# Patient Record
Sex: MALE | Race: WHITE | Employment: OTHER | URBAN - NONMETROPOLITAN AREA
[De-identification: names, ages, dates, MRNs, and addresses within clinical notes are randomized per-mention and may not be internally consistent; named-entity substitution may affect disease eponyms.]

---

## 2017-08-13 ENCOUNTER — HOSPITAL ENCOUNTER (EMERGENCY)
Age: 62
Discharge: HOME | End: 2017-08-13
Attending: EMERGENCY MEDICINE
Payer: MEDICARE

## 2017-08-13 ENCOUNTER — APPOINTMENT (OUTPATIENT)
Dept: GENERAL RADIOLOGY | Age: 62
End: 2017-08-13
Payer: MEDICARE

## 2017-08-13 VITALS
TEMPERATURE: 98.2 F | DIASTOLIC BLOOD PRESSURE: 86 MMHG | HEART RATE: 76 BPM | SYSTOLIC BLOOD PRESSURE: 161 MMHG | RESPIRATION RATE: 18 BRPM | OXYGEN SATURATION: 96 %

## 2017-08-13 DIAGNOSIS — S16.1XXA CERVICAL STRAIN, ACUTE, INITIAL ENCOUNTER: ICD-10-CM

## 2017-08-13 DIAGNOSIS — R52 PAIN: ICD-10-CM

## 2017-08-13 DIAGNOSIS — M47.26 OSTEOARTHRITIS OF SPINE WITH RADICULOPATHY, LUMBAR REGION: Primary | ICD-10-CM

## 2017-08-13 PROCEDURE — 72040 X-RAY EXAM NECK SPINE 2-3 VW: CPT

## 2017-08-13 PROCEDURE — 99283 EMERGENCY DEPT VISIT LOW MDM: CPT

## 2017-08-13 PROCEDURE — 72170 X-RAY EXAM OF PELVIS: CPT

## 2017-08-13 PROCEDURE — 72050 X-RAY EXAM NECK SPINE 4/5VWS: CPT

## 2017-08-13 PROCEDURE — 72110 X-RAY EXAM L-2 SPINE 4/>VWS: CPT

## 2017-08-13 PROCEDURE — 6360000002 HC RX W HCPCS: Performed by: EMERGENCY MEDICINE

## 2017-08-13 PROCEDURE — 96372 THER/PROPH/DIAG INJ SC/IM: CPT

## 2017-08-13 RX ORDER — KETOROLAC TROMETHAMINE 30 MG/ML
30 INJECTION, SOLUTION INTRAMUSCULAR; INTRAVENOUS ONCE
Status: COMPLETED | OUTPATIENT
Start: 2017-08-13 | End: 2017-08-13

## 2017-08-13 RX ORDER — NAPROXEN 375 MG/1
375 TABLET ORAL 2 TIMES DAILY
Qty: 15 TABLET | Refills: 0 | Status: ON HOLD | OUTPATIENT
Start: 2017-08-13 | End: 2022-01-18 | Stop reason: HOSPADM

## 2017-08-13 RX ORDER — CYCLOBENZAPRINE HCL 10 MG
10 TABLET ORAL 3 TIMES DAILY PRN
Qty: 15 TABLET | Refills: 0 | Status: SHIPPED | OUTPATIENT
Start: 2017-08-13 | End: 2017-08-18

## 2017-08-13 RX ADMIN — KETOROLAC TROMETHAMINE 30 MG: 30 INJECTION, SOLUTION INTRAMUSCULAR at 10:49

## 2017-08-13 ASSESSMENT — ENCOUNTER SYMPTOMS
SHORTNESS OF BREATH: 0
EYE REDNESS: 0
NAUSEA: 0
ABDOMINAL PAIN: 0
RHINORRHEA: 0
BACK PAIN: 1
WHEEZING: 0
EYE DISCHARGE: 0
VOMITING: 0
COUGH: 0
DIARRHEA: 0
SORE THROAT: 0

## 2017-08-13 ASSESSMENT — PAIN SCALES - GENERAL
PAINLEVEL_OUTOF10: 6
PAINLEVEL_OUTOF10: 6

## 2017-08-13 ASSESSMENT — PAIN DESCRIPTION - PAIN TYPE: TYPE: ACUTE PAIN

## 2017-08-13 ASSESSMENT — PAIN DESCRIPTION - FREQUENCY: FREQUENCY: CONTINUOUS

## 2018-01-10 ENCOUNTER — HOSPITAL ENCOUNTER (EMERGENCY)
Age: 63
Discharge: HOME | End: 2018-01-10
Payer: MEDICARE

## 2018-01-10 VITALS
DIASTOLIC BLOOD PRESSURE: 102 MMHG | TEMPERATURE: 98.3 F | HEART RATE: 96 BPM | OXYGEN SATURATION: 95 % | HEIGHT: 71 IN | WEIGHT: 190 LBS | RESPIRATION RATE: 18 BRPM | SYSTOLIC BLOOD PRESSURE: 174 MMHG | BODY MASS INDEX: 26.6 KG/M2

## 2018-01-10 DIAGNOSIS — M54.31 SCIATICA OF RIGHT SIDE: Primary | ICD-10-CM

## 2018-01-10 PROCEDURE — 6360000002 HC RX W HCPCS: Performed by: NURSE PRACTITIONER

## 2018-01-10 PROCEDURE — 99283 EMERGENCY DEPT VISIT LOW MDM: CPT

## 2018-01-10 PROCEDURE — 96372 THER/PROPH/DIAG INJ SC/IM: CPT

## 2018-01-10 RX ORDER — KETOROLAC TROMETHAMINE 30 MG/ML
60 INJECTION, SOLUTION INTRAMUSCULAR; INTRAVENOUS ONCE
Status: COMPLETED | OUTPATIENT
Start: 2018-01-10 | End: 2018-01-10

## 2018-01-10 RX ORDER — METHYLPREDNISOLONE 4 MG/1
TABLET ORAL
Qty: 1 KIT | Refills: 0 | Status: SHIPPED | OUTPATIENT
Start: 2018-01-10 | End: 2018-01-16

## 2018-01-10 RX ORDER — ORPHENADRINE CITRATE 30 MG/ML
60 INJECTION INTRAMUSCULAR; INTRAVENOUS ONCE
Status: COMPLETED | OUTPATIENT
Start: 2018-01-10 | End: 2018-01-10

## 2018-01-10 RX ORDER — CYCLOBENZAPRINE HCL 10 MG
10 TABLET ORAL 3 TIMES DAILY PRN
Qty: 15 TABLET | Refills: 0 | Status: ON HOLD | OUTPATIENT
Start: 2018-01-10 | End: 2022-01-18 | Stop reason: HOSPADM

## 2018-01-10 RX ADMIN — ORPHENADRINE CITRATE 60 MG: 30 INJECTION INTRAMUSCULAR; INTRAVENOUS at 14:01

## 2018-01-10 RX ADMIN — KETOROLAC TROMETHAMINE 60 MG: 30 INJECTION, SOLUTION INTRAMUSCULAR at 14:01

## 2018-01-10 ASSESSMENT — PAIN DESCRIPTION - DESCRIPTORS: DESCRIPTORS: SHOOTING

## 2018-01-10 ASSESSMENT — ENCOUNTER SYMPTOMS
COUGH: 0
PHOTOPHOBIA: 0
RHINORRHEA: 0
BACK PAIN: 1
DIARRHEA: 0
ABDOMINAL PAIN: 0
SHORTNESS OF BREATH: 0
VOMITING: 0
NAUSEA: 0

## 2018-01-10 ASSESSMENT — PAIN DESCRIPTION - FREQUENCY: FREQUENCY: CONTINUOUS

## 2018-01-10 ASSESSMENT — PAIN DESCRIPTION - LOCATION: LOCATION: LEG

## 2018-01-10 ASSESSMENT — PAIN SCALES - GENERAL: PAINLEVEL_OUTOF10: 6

## 2018-01-10 ASSESSMENT — PAIN DESCRIPTION - PAIN TYPE: TYPE: ACUTE PAIN

## 2018-01-10 ASSESSMENT — PAIN DESCRIPTION - ORIENTATION: ORIENTATION: RIGHT

## 2018-01-10 NOTE — ED PROVIDER NOTES
paternal uncles is alive. family history includes Cancer (age of onset: 61) in his paternal uncle; Coronary Art Dis in his mother; Diabetes in his paternal grandmother and sister; Heart Attack (age of onset: 68) in his mother; Heart Disease in his mother; Heart Failure in his mother; High Blood Pressure in his mother and sister; Hypertension in his mother; Other in his sister. SOCIAL HISTORY      reports that he has been smoking Cigarettes. He has a 5.00 pack-year smoking history. He quit smokeless tobacco use about 45 years ago. He reports that he drinks about 3.6 oz of alcohol per week . He reports that he uses drugs, including Cocaine. PHYSICAL EXAM     INITIAL VITALS:  height is 5' 11\" (1.803 m) and weight is 190 lb (86.2 kg). His oral temperature is 98.3 °F (36.8 °C). His blood pressure is 174/102 (abnormal) and his pulse is 96. His respiration is 18 and oxygen saturation is 95%. Physical Exam   Constitutional: Vital signs are normal. He appears well-developed and well-nourished. He is active and cooperative. Non-toxic appearance. No distress. HENT:   Head: Normocephalic and atraumatic. Right Ear: Hearing normal.   Left Ear: Hearing normal.   Nose: Nose normal. No rhinorrhea. No epistaxis. Mouth/Throat: Uvula is midline, oropharynx is clear and moist and mucous membranes are normal.   Eyes: Conjunctivae and EOM are normal. Pupils are equal, round, and reactive to light. Neck: No JVD present. No neck rigidity. No tracheal deviation present. Cardiovascular: Normal rate, regular rhythm, normal heart sounds and intact distal pulses. Pulses:       Dorsalis pedis pulses are 2+ on the right side, and 2+ on the left side. Posterior tibial pulses are 2+ on the right side, and 2+ on the left side. Pulmonary/Chest: Effort normal and breath sounds normal. No respiratory distress. He has no decreased breath sounds. He has no wheezes. Abdominal: Normal appearance.  He exhibits no 95%   Weight: 190 lb (86.2 kg)   Height: 5' 11\" (1.803 m)       MDM    Medications   orphenadrine (NORFLEX) injection 60 mg (60 mg Intramuscular Given 1/10/18 1401)   ketorolac (TORADOL) injection 60 mg (60 mg Intramuscular Given 1/10/18 1401)       The patient was seen and evaluated within the ED today for back pain with associated radicular right leg pain. Within the department, I observed the patient's vital signs to show an elevated blood pressure. The patient states he has had high blood pressure for awhile, but does not have a PCP for management of the blood pressure. The patient has been informed that they may have pre-hypertension or hypertension based on a blood pressure reading in the emergency department. I recommend that the patient call the primary care provider listed on their discharge instructions or a physician of their choice this week to arrange follow up for further evaluation of possible pre-hypertension or hypertension. On exam, I appreciated right lumbar paraspinal tenderness, no midline lumbar spine tenderenss. Imaging was not felt necessary at this time as the patient's pain is chronic and he did not have any injury prior to the pain increasing. This was discussed with the patient, who was amenable with this decision. Within the department, the patient was treated with Norflex and Toradol. I observed the patient's condition to improve during the duration of their stay. I explained my proposed course of treatment to the patient, and they were amenable to my decision. They were discharged home, and they will return to the ED if their symptoms become more severe in nature, or otherwise change. CRITICAL CARE:   None     CONSULTS:  None     PROCEDURES:  None     FINAL IMPRESSION      1. Sciatica of right side          DISPOSITION/PLAN   The patient was discharged.     PATIENT REFERRED TO:  [facility] [place] [address] [address] [phone]  Call   As needed    Find a PCP from the list you were provided. DISCHARGE MEDICATIONS:  New Prescriptions    CYCLOBENZAPRINE (FLEXERIL) 10 MG TABLET    Take 1 tablet by mouth 3 times daily as needed for Muscle spasms . Do not drive or operate heavy machinery while taking this medication. METHYLPREDNISOLONE (MEDROL, STONEY,) 4 MG TABLET    Take by mouth. (Please note that portions of this note were completed with a voice recognition program.  Efforts were made to edit the dictations but occasionally words are mis-transcribed.)    Scribe:  Sun Diaz 1/10/18 1:21 PM Scribing for and in the presence of En Mtz CNP. Signed by: Anthony Young, 1/10/18 2:46 PM    Provider:  I personally performed the services described in the documentation, reviewed and edited the documentation which was dictated to the scribe in my presence, and it accurately records my words and actions.     En Mtz CNP 01/10/18 2:46 PM    Majo Degroot [address], APRFERNANDO  01/10/18 5691

## 2021-09-23 ENCOUNTER — HOSPITAL ENCOUNTER (EMERGENCY)
Age: 66
Discharge: LEFT BEFORE DISCHARGE | End: 2021-09-23
Attending: EMERGENCY MEDICINE
Payer: MEDICARE

## 2021-09-23 VITALS
SYSTOLIC BLOOD PRESSURE: 195 MMHG | HEIGHT: 71 IN | DIASTOLIC BLOOD PRESSURE: 113 MMHG | OXYGEN SATURATION: 97 % | HEART RATE: 94 BPM | RESPIRATION RATE: 20 BRPM | WEIGHT: 187 LBS | BODY MASS INDEX: 26.18 KG/M2 | TEMPERATURE: 98.2 F

## 2021-09-23 ASSESSMENT — PAIN SCALES - GENERAL: PAINLEVEL_OUTOF10: 7

## 2021-09-23 ASSESSMENT — PAIN DESCRIPTION - LOCATION: LOCATION: HEAD

## 2021-09-23 ASSESSMENT — PAIN DESCRIPTION - ORIENTATION: ORIENTATION: RIGHT

## 2021-09-24 NOTE — ED NOTES
Pt reports he used to take Lisinopril for his BP but has not taken med for 3 years.      Tiffanie Thomas RN  09/23/21 4203
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
## 2021-09-24 NOTE — ED PROVIDER NOTES
I went to see the patient and he was not in the room. I was told by nurse that he eloped.       Dori Johnson MD  09/23/21 3882

## 2021-09-24 NOTE — ED NOTES
Pt has put on call light multiple times requesting to be seen by physician. Pt now states he no longer wants to wait and is leaving. Pt A&Ox4. Pt ambulating without assistance in room.      Willard Ceballos RN  09/23/21 4509

## 2021-10-07 ENCOUNTER — HOSPITAL ENCOUNTER (EMERGENCY)
Age: 66
Discharge: HOME | End: 2021-10-07
Payer: MEDICARE

## 2021-10-07 VITALS
RESPIRATION RATE: 18 BRPM | SYSTOLIC BLOOD PRESSURE: 150 MMHG | DIASTOLIC BLOOD PRESSURE: 90 MMHG | HEART RATE: 75 BPM | OXYGEN SATURATION: 97 %
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
## 2022-01-15 ENCOUNTER — HOSPITAL ENCOUNTER (OUTPATIENT)
Age: 67
Setting detail: OBSERVATION
Discharge: HOME | End: 2022-01-18
Attending: EMERGENCY MEDICINE | Admitting: INTERNAL MEDICINE
Payer: MEDICARE

## 2022-01-15 DIAGNOSIS — R42 DISEQUILIBRIUM: ICD-10-CM

## 2022-01-15 DIAGNOSIS — K92.0 HEMATEMESIS, PRESENCE OF NAUSEA NOT SPECIFIED: ICD-10-CM

## 2022-01-15 DIAGNOSIS — R07.9 CHEST PAIN, MODERATE CORONARY ARTERY RISK: Primary | ICD-10-CM

## 2022-01-15 PROCEDURE — 99285 EMERGENCY DEPT VISIT HI MDM: CPT

## 2022-01-16 ENCOUNTER — APPOINTMENT (OUTPATIENT)
Dept: CT IMAGING | Age: 67
End: 2022-01-16
Payer: MEDICARE

## 2022-01-16 PROBLEM — R07.9 CHEST PAIN: Status: ACTIVE | Noted: 2022-01-16

## 2022-01-16 LAB
ALBUMIN SERPL-MCNC: 3.8 G/DL (ref 3.5–5.1)
ALP BLD-CCNC: 117 U/L (ref 38–126)
ALT SERPL-CCNC: 54 U/L (ref 11–66)
AMPHETAMINE+METHAMPHETAMINE URINE SCREEN: NEGATIVE
ANION GAP SERPL CALCULATED.3IONS-SCNC: 13 MEQ/L (ref 8–16)
AST SERPL-CCNC: 99 U/L (ref 5–40)
BARBITURATE QUANTITATIVE URINE: NEGATIVE
BASOPHILS # BLD: 0.3 %
BASOPHILS ABSOLUTE: 0 THOU/MM3 (ref 0–0.1)
BENZODIAZEPINE QUANTITATIVE URINE: NEGATIVE
BILIRUB SERPL-MCNC: 0.4 MG/DL (ref 0.3–1.2)
BUN BLDV-MCNC: 10 MG/DL (ref 7–22)
CALCIUM SERPL-MCNC: 8.9 MG/DL (ref 8.5–10.5)
CANNABINOID QUANTITATIVE URINE: NEGATIVE
CHLORIDE BLD-SCNC: 106 MEQ/L (ref 98–111)
CHOLESTEROL, TOTAL: 171 MG/DL (ref 100–199)
CO2: 21 MEQ/L (ref 23–33)
COCAINE METABOLITE QUANTITATIVE URINE: NEGATIVE
CREAT SERPL-MCNC: 0.9 MG/DL (ref 0.4–1.2)
EKG ATRIAL RATE: 82 BPM
EKG ATRIAL RATE: 92 BPM
EKG P AXIS: 63 DEGREES
EKG P AXIS: 77 DEGREES
EKG P-R INTERVAL: 184 MS
EKG P-R INTERVAL: 184 MS
EKG Q-T INTERVAL: 382 MS
EKG Q-T INTERVAL: 396 MS
EKG QRS DURATION: 104 MS
EKG QRS DURATION: 118 MS
EKG QTC CALCULATION (BAZETT): 462 MS
EKG QTC CALCULATION (BAZETT): 472 MS
EKG R AXIS: 43 DEGREES
EKG R AXIS: 70 DEGREES
EKG T AXIS: 45 DEGREES
EKG T AXIS: 68 DEGREES
EKG VENTRICULAR RATE: 82 BPM
EKG VENTRICULAR RATE: 92 BPM
EOSINOPHIL # BLD: 3.9 %
EOSINOPHILS ABSOLUTE: 0.1 THOU/MM3 (ref 0–0.4)
ERYTHROCYTE [DISTWIDTH] IN BLOOD BY AUTOMATED COUNT: 13.2 % (ref 11.5–14.5)
ERYTHROCYTE [DISTWIDTH] IN BLOOD BY AUTOMATED COUNT: 47.3 FL (ref 35–45)
ETHYL ALCOHOL, SERUM: 0.03 %
GFR SERPL CREATININE-BSD FRML MDRD: 84 ML/MIN/1.73M2
GLUCOSE BLD-MCNC: 86 MG/DL (ref 70–108)
HCT VFR BLD CALC: 40.6 % (ref 42–52)
HDLC SERPL-MCNC: 63 MG/DL
HEMOGLOBIN: 13.7 GM/DL (ref 14–18)
IMMATURE GRANS (ABS): 0.01 THOU/MM3 (ref 0–0.07)
IMMATURE GRANULOCYTES: 0.3 %
LDL CHOLESTEROL CALCULATED: 78 MG/DL
LIPASE: 79 U/L (ref 5.6–51.3)
LYMPHOCYTES # BLD: 40.8 %
LYMPHOCYTES ABSOLUTE: 1.5 THOU/MM3 (ref 1–4.8)
MAGNESIUM: 2 MG/DL (ref 1.6–2.4)
MCH RBC QN AUTO: 33.4 PG (ref 26–33)
MCHC RBC AUTO-ENTMCNC: 33.7 GM/DL (ref 32.2–35.5)
MCV RBC AUTO: 99 FL (ref 80–94)
MONOCYTES # BLD: 12.7 %
MONOCYTES ABSOLUTE: 0.5 THOU/MM3 (ref 0.4–1.3)
NUCLEATED RED BLOOD CELLS: 0 /100 WBC
OPIATES, URINE: NEGATIVE
OSMOLALITY CALCULATION: 277.7 MOSMOL/KG (ref 275–300)
OXYCODONE: NEGATIVE
PHENCYCLIDINE QUANTITATIVE URINE: NEGATIVE
PLATELET # BLD: 76 THOU/MM3 (ref 130–400)
PMV BLD AUTO: 9.3 FL (ref 9.4–12.4)
POTASSIUM SERPL-SCNC: 3.4 MEQ/L (ref 3.5–5.2)
PRO-BNP: 65.3 PG/ML (ref 0–900)
RBC # BLD: 4.1 MILL/MM3 (ref 4.7–6.1)
SARS-COV-2, NAAT: NOT  DETECTED
SEG NEUTROPHILS: 42 %
SEGMENTED NEUTROPHILS ABSOLUTE COUNT: 1.5 THOU/MM3 (ref 1.8–7.7)
SODIUM BLD-SCNC: 140 MEQ/L (ref 135–145)
TOTAL PROTEIN: 7.5 G/DL (ref 6.1–8)
TRIGL SERPL-MCNC: 148 MG/DL (ref 0–199)
TROPONIN T: < 0.01 NG/ML
TSH SERPL DL<=0.05 MIU/L-ACNC: 1.2 UIU/ML (ref 0.4–4.2)
WBC # BLD: 3.6 THOU/MM3 (ref 4.8–10.8)

## 2022-01-16 PROCEDURE — 83690 ASSAY OF LIPASE: CPT

## 2022-01-16 PROCEDURE — 2580000003 HC RX 258: Performed by: INTERNAL MEDICINE

## 2022-01-16 PROCEDURE — 99220 PR INITIAL OBSERVATION CARE/DAY 70 MINUTES: CPT | Performed by: INTERNAL MEDICINE

## 2022-01-16 PROCEDURE — 83735 ASSAY OF MAGNESIUM: CPT

## 2022-01-16 PROCEDURE — 36415 COLL VENOUS BLD VENIPUNCTURE: CPT

## 2022-01-16 PROCEDURE — G0378 HOSPITAL OBSERVATION PER HR: HCPCS

## 2022-01-16 PROCEDURE — 84443 ASSAY THYROID STIM HORMONE: CPT

## 2022-01-16 PROCEDURE — 6370000000 HC RX 637 (ALT 250 FOR IP): Performed by: INTERNAL MEDICINE

## 2022-01-16 PROCEDURE — 83880 ASSAY OF NATRIURETIC PEPTIDE: CPT

## 2022-01-16 PROCEDURE — 6360000004 HC RX CONTRAST MEDICATION: Performed by: EMERGENCY MEDICINE

## 2022-01-16 PROCEDURE — 74177 CT ABD & PELVIS W/CONTRAST: CPT

## 2022-01-16 PROCEDURE — 96360 HYDRATION IV INFUSION INIT: CPT

## 2022-01-16 PROCEDURE — 84484 ASSAY OF TROPONIN QUANT: CPT

## 2022-01-16 PROCEDURE — 71260 CT THORAX DX C+: CPT

## 2022-01-16 PROCEDURE — 2580000003 HC RX 258: Performed by: EMERGENCY MEDICINE

## 2022-01-16 PROCEDURE — 96361 HYDRATE IV INFUSION ADD-ON: CPT

## 2022-01-16 PROCEDURE — 6370000000 HC RX 637 (ALT 250 FOR IP): Performed by: EMERGENCY MEDICINE

## 2022-01-16 PROCEDURE — 80307 DRUG TEST PRSMV CHEM ANLYZR: CPT

## 2022-01-16 PROCEDURE — 87635 SARS-COV-2 COVID-19 AMP PRB: CPT

## 2022-01-16 PROCEDURE — 80061 LIPID PANEL: CPT

## 2022-01-16 PROCEDURE — 82077 ASSAY SPEC XCP UR&BREATH IA: CPT

## 2022-01-16 PROCEDURE — 93005 ELECTROCARDIOGRAM TRACING: CPT | Performed by: EMERGENCY MEDICINE

## 2022-01-16 PROCEDURE — 70450 CT HEAD/BRAIN W/O DYE: CPT

## 2022-01-16 PROCEDURE — 85025 COMPLETE CBC W/AUTO DIFF WBC: CPT

## 2022-01-16 PROCEDURE — 80053 COMPREHEN METABOLIC PANEL: CPT

## 2022-01-16 RX ORDER — POLYETHYLENE GLYCOL 3350 17 G/17G
17 POWDER, FOR SOLUTION ORAL DAILY PRN
Status: DISCONTINUED | OUTPATIENT
Start: 2022-01-16 | End: 2022-01-18 | Stop reason: HOSPADM

## 2022-01-16 RX ORDER — SODIUM CHLORIDE 9 MG/ML
INJECTION, SOLUTION INTRAVENOUS CONTINUOUS
Status: DISCONTINUED | OUTPATIENT
Start: 2022-01-16 | End: 2022-01-18 | Stop reason: HOSPADM

## 2022-01-16 RX ORDER — ASPIRIN 81 MG/1
324 TABLET, CHEWABLE ORAL ONCE
Status: COMPLETED | OUTPATIENT
Start: 2022-01-16 | End: 2022-01-16

## 2022-01-16 RX ORDER — ACETAMINOPHEN 650 MG/1
650 SUPPOSITORY RECTAL EVERY 6 HOURS PRN
Status: DISCONTINUED | OUTPATIENT
Start: 2022-01-16 | End: 2022-01-18 | Stop reason: HOSPADM

## 2022-01-16 RX ORDER — SODIUM CHLORIDE 9 MG/ML
INJECTION, SOLUTION INTRAVENOUS CONTINUOUS
Status: DISCONTINUED | OUTPATIENT
Start: 2022-01-16 | End: 2022-01-16

## 2022-01-16 RX ORDER — AMLODIPINE BESYLATE 10 MG/1
10 TABLET ORAL DAILY
Status: DISCONTINUED | OUTPATIENT
Start: 2022-01-16 | End: 2022-01-18 | Stop reason: HOSPADM

## 2022-01-16 RX ORDER — HYDRALAZINE HYDROCHLORIDE 50 MG/1
50 TABLET, FILM COATED ORAL EVERY 8 HOURS
Status: DISCONTINUED | OUTPATIENT
Start: 2022-01-16 | End: 2022-01-18 | Stop reason: HOSPADM

## 2022-01-16 RX ORDER — POTASSIUM CHLORIDE 20 MEQ/1
40 TABLET, EXTENDED RELEASE ORAL ONCE
Status: COMPLETED | OUTPATIENT
Start: 2022-01-16 | End: 2022-01-16

## 2022-01-16 RX ORDER — SODIUM CHLORIDE 0.9 % (FLUSH) 0.9 %
5-40 SYRINGE (ML) INJECTION PRN
Status: DISCONTINUED | OUTPATIENT
Start: 2022-01-16 | End: 2022-01-18 | Stop reason: HOSPADM

## 2022-01-16 RX ORDER — ONDANSETRON 2 MG/ML
4 INJECTION INTRAMUSCULAR; INTRAVENOUS EVERY 6 HOURS PRN
Status: DISCONTINUED | OUTPATIENT
Start: 2022-01-16 | End: 2022-01-18 | Stop reason: HOSPADM

## 2022-01-16 RX ORDER — ASPIRIN 81 MG/1
81 TABLET, CHEWABLE ORAL DAILY
Status: DISCONTINUED | OUTPATIENT
Start: 2022-01-17 | End: 2022-01-18 | Stop reason: HOSPADM

## 2022-01-16 RX ORDER — FAMOTIDINE 20 MG/1
20 TABLET, FILM COATED ORAL 2 TIMES DAILY
Status: DISCONTINUED | OUTPATIENT
Start: 2022-01-16 | End: 2022-01-18 | Stop reason: HOSPADM

## 2022-01-16 RX ORDER — SODIUM CHLORIDE 0.9 % (FLUSH) 0.9 %
5-40 SYRINGE (ML) INJECTION EVERY 12 HOURS SCHEDULED
Status: DISCONTINUED | OUTPATIENT
Start: 2022-01-16 | End: 2022-01-18 | Stop reason: HOSPADM

## 2022-01-16 RX ORDER — SODIUM CHLORIDE 9 MG/ML
25 INJECTION, SOLUTION INTRAVENOUS PRN
Status: DISCONTINUED | OUTPATIENT
Start: 2022-01-16 | End: 2022-01-18 | Stop reason: HOSPADM

## 2022-01-16 RX ORDER — HYDRALAZINE HYDROCHLORIDE 25 MG/1
25 TABLET, FILM COATED ORAL EVERY 8 HOURS
Status: DISCONTINUED | OUTPATIENT
Start: 2022-01-16 | End: 2022-01-16

## 2022-01-16 RX ORDER — ONDANSETRON 4 MG/1
4 TABLET, ORALLY DISINTEGRATING ORAL EVERY 8 HOURS PRN
Status: DISCONTINUED | OUTPATIENT
Start: 2022-01-16 | End: 2022-01-18 | Stop reason: HOSPADM

## 2022-01-16 RX ORDER — CARVEDILOL 6.25 MG/1
6.25 TABLET ORAL 2 TIMES DAILY
Status: DISCONTINUED | OUTPATIENT
Start: 2022-01-16 | End: 2022-01-18 | Stop reason: HOSPADM

## 2022-01-16 RX ORDER — 0.9 % SODIUM CHLORIDE 0.9 %
1000 INTRAVENOUS SOLUTION INTRAVENOUS ONCE
Status: COMPLETED | OUTPATIENT
Start: 2022-01-16 | End: 2022-01-16

## 2022-01-16 RX ORDER — ACETAMINOPHEN 325 MG/1
650 TABLET ORAL EVERY 6 HOURS PRN
Status: DISCONTINUED | OUTPATIENT
Start: 2022-01-16 | End: 2022-01-18 | Stop reason: HOSPADM

## 2022-01-16 RX ORDER — ZOLPIDEM TARTRATE 5 MG/1
5 TABLET ORAL NIGHTLY PRN
Status: DISCONTINUED | OUTPATIENT
Start: 2022-01-16 | End: 2022-01-18 | Stop reason: HOSPADM

## 2022-01-16 RX ADMIN — ACETAMINOPHEN 650 MG: 325 TABLET ORAL at 08:44

## 2022-01-16 RX ADMIN — FAMOTIDINE 20 MG: 20 TABLET ORAL at 20:24

## 2022-01-16 RX ADMIN — ZOLPIDEM TARTRATE 5 MG: 5 TABLET ORAL at 20:24

## 2022-01-16 RX ADMIN — HYDRALAZINE HYDROCHLORIDE 25 MG: 25 TABLET, FILM COATED ORAL at 08:38

## 2022-01-16 RX ADMIN — SODIUM CHLORIDE: 9 INJECTION, SOLUTION INTRAVENOUS at 16:36

## 2022-01-16 RX ADMIN — POTASSIUM CHLORIDE 40 MEQ: 1500 TABLET, EXTENDED RELEASE ORAL at 08:44

## 2022-01-16 RX ADMIN — SODIUM CHLORIDE 1000 ML: 9 INJECTION, SOLUTION INTRAVENOUS at 00:46

## 2022-01-16 RX ADMIN — IOPAMIDOL 80 ML: 755 INJECTION, SOLUTION INTRAVENOUS at 02:24

## 2022-01-16 RX ADMIN — SODIUM CHLORIDE: 9 INJECTION, SOLUTION INTRAVENOUS at 00:49

## 2022-01-16 RX ADMIN — FAMOTIDINE 20 MG: 20 TABLET ORAL at 08:44

## 2022-01-16 RX ADMIN — CARVEDILOL 6.25 MG: 6.25 TABLET, FILM COATED ORAL at 08:38

## 2022-01-16 RX ADMIN — SODIUM CHLORIDE, PRESERVATIVE FREE 10 ML: 5 INJECTION INTRAVENOUS at 08:39

## 2022-01-16 RX ADMIN — HYDRALAZINE HYDROCHLORIDE 50 MG: 50 TABLET, FILM COATED ORAL at 16:36

## 2022-01-16 RX ADMIN — ASPIRIN 81 MG CHEWABLE TABLET 324 MG: 81 TABLET CHEWABLE at 04:33

## 2022-01-16 RX ADMIN — CARVEDILOL 6.25 MG: 6.25 TABLET, FILM COATED ORAL at 20:24

## 2022-01-16 RX ADMIN — AMLODIPINE BESYLATE 10 MG: 10 TABLET ORAL at 08:44

## 2022-01-16 RX ADMIN — SODIUM CHLORIDE: 9 INJECTION, SOLUTION INTRAVENOUS at 08:44

## 2022-01-16 ASSESSMENT — PAIN SCALES - GENERAL
PAINLEVEL_OUTOF10: 8
PAINLEVEL_OUTOF10: 0

## 2022-01-16 ASSESSMENT — ENCOUNTER SYMPTOMS
WHEEZING: 0
ABDOMINAL PAIN: 1
SHORTNESS OF BREATH: 1

## 2022-01-16 NOTE — ED TRIAGE NOTES
Pt brought in by EMS form home c/o loss of balance that started 5 months ago following a TBI. Pt reports his balance is worse recently. Pt reports SOB, HA and dizziness for the past 2 months.

## 2022-01-16 NOTE — ED NOTES
Called 6K to notify that pt would be up in about 10 minutes, talked to [facility]. Pt transported by geovani montero.       Radha Sanders  01/16/22 1358
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
## 2022-01-16 NOTE — ED NOTES
Pt resting in bed with call light in reach, states no further needs at this time. Respirations easy and unlabored, no distress noted at this time.        nAyi, [address] [facility]  01/16/22 6971
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
## 2022-01-16 NOTE — CONSULTS
[address] [place] [address]                                  CONSULTATION    PATIENT NAME: Viri Ludwig                     :        1955  MED REC NO:   [id number]                           ROOM:       0012  ACCOUNT NO:   [de-identified]                           ADMIT DATE: 01/15/2022  PROVIDER:     MARIA D Franciscoimoto:  2022    CARDIOLOGY CONSULTATION    REASON FOR CONSULTATION:  Chest pain. REFERRING PROVIDER:  Hospitalist service. HISTORY OF PRESENT ILLNESS:  This is a pleasant 14-year-old gentleman  who apparently has not had any obvious cardiac history, who has  underlying hypertension and multiple risk factors including smoking and  family history of coronary artery disease, comes in with episodes of  chest pain described as being sharp, non-radiating. No specific  triggers. Some associated shortness of breath. No coughing. He denies  having any COVID-19 infection lately. Denies any fever. Initial workup  revealed no obvious acute abnormality. We were consulted to assist in  the management. So far, cardiac enzymes were unremarkable. REVIEW OF SYSTEMS:  No obvious fever or chills. No hematuria or  dysuria. No abdominal pain, nausea, vomiting, or diarrhea. No obvious  active psych problems or suicidal ideation. No skin rashes. No obvious  dizziness, lightheadedness or loss of consciousness. No recent trauma. No bleeding problem. Nonspecific arthritic problems. PAST MEDICAL HISTORY:  1. Hypertension. 2.  Hyperlipidemia. ALLERGIES:  NO KNOWN DRUG ALLERGIES. MEDICATIONS:  Amlodipine 10 a day, aspirin 81 a day, Coreg 6.25 twice a  day, hydralazine 25 every eight hours. SOCIAL HISTORY:  He is smoking. No alcohol or drug abuse reported. FAMILY HISTORY:  Significant for coronary artery disease.     PHYSICAL EXAMINATION:  VITAL SIGNS:  Blood pressure 130/80, heart rate of 70.  GENERAL APPEARANCE:  A pleasant gentleman in no obvious acute distress. HEENT:  Eyes and Ears:  No discharge. NECK:  No JVD. No bruits. No masses. LUNGS:  Decreased air entry. No crackles. No wheezes. HEART:  Normal S1, S2. Systolic murmur grade 2/6. ABDOMEN:  Soft, nontender. Positive bowel sounds. No organomegaly. EXTREMITIES:  No significant edema. NEUROLOGIC:  Grossly intact. Awake, alert. No focal deficits. PSYCHIATRIC:   No evidence of active psychosis. SKIN:  No rashes. LABORATORY DATA:  Showed sodium 140, potassium 3.4, BUN 10, creatinine  0.9. White count 3.6, hemoglobin 13.7, hematocrit 40.6, platelets 76. IMPRESSION:  This is a gentleman who comes in with the above  presentation, atypical chest pain, higher risk than average for coronary  artery disease. So far, workup ruled out myocardial infarct. RECOMMENDATIONS:  As follows,  1. I would highly recommend obtaining a COVID-19 test to make sure the  patient does not have COVID infection. 2.  If he rules out for COVID-19 infection, he will be scheduled for  noninvasive cardiac testing including a stress test and an  echocardiogram.  3.  Further management will follow accordingly. I will be of service  tomorrow. My team will be following. Thank you for allowing me to participate in his care.         Rigo Sanchez M.D.    D: 01/16/2022 11:20:25       T: 01/16/2022 11:22:47     [email or web address]  Job#: [id number]     Doc#: [id number]    CC:

## 2022-01-16 NOTE — PROGRESS NOTES
Pt admitted to  6K12 from ED. Complaints: Chest pain / discomfort. IV site free of s/s of infection or infiltration. Vital signs obtained. Assessment and data collection initiated. Two nurse skin assessment performed by Collin Chan RN and Jayesh Joseph. Oriented to room. Policies and procedures for 6K explained. Collin Chan RN discussed hourly rounding with patient addressing 5 P's. Fall prevention and safety brochure discussed with patient. Bed alarm on. Call light in reach.
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
## 2022-01-16 NOTE — ED NOTES
Dr. Sparrow Parent notified of bp 190/132. No new orders received. Pt okay to have water. Given water. Updated with plan of care. Denies needs at this time. Will continue to monitor. Call light in reach.      Maria Sebastian RN  01/16/22 [id number]

## 2022-01-16 NOTE — ED NOTES
Pt resting in bed with call light in reach, states no further needs at this time. No distress noted, pt breaths easy and unlabored.        [facility]  01/16/22 1887

## 2022-01-17 ENCOUNTER — APPOINTMENT (OUTPATIENT)
Dept: NON INVASIVE DIAGNOSTICS | Age: 67
End: 2022-01-17
Payer: MEDICARE

## 2022-01-17 LAB
ALBUMIN SERPL-MCNC: 3.4 G/DL (ref 3.5–5.1)
ALP BLD-CCNC: 112 U/L (ref 38–126)
ALT SERPL-CCNC: 45 U/L (ref 11–66)
ANION GAP SERPL CALCULATED.3IONS-SCNC: 11 MEQ/L (ref 8–16)
AST SERPL-CCNC: 75 U/L (ref 5–40)
AVERAGE GLUCOSE: 96 MG/DL (ref 70–126)
BILIRUB SERPL-MCNC: 0.9 MG/DL (ref 0.3–1.2)
BILIRUBIN DIRECT: 0.4 MG/DL (ref 0–0.3)
BUN BLDV-MCNC: 12 MG/DL (ref 7–22)
CALCIUM SERPL-MCNC: 8.6 MG/DL (ref 8.5–10.5)
CHLORIDE BLD-SCNC: 108 MEQ/L (ref 98–111)
CO2: 19 MEQ/L (ref 23–33)
CREAT SERPL-MCNC: 0.8 MG/DL (ref 0.4–1.2)
EKG ATRIAL RATE: 80 BPM
EKG P AXIS: 82 DEGREES
EKG P-R INTERVAL: 176 MS
EKG Q-T INTERVAL: 402 MS
EKG QRS DURATION: 112 MS
EKG QTC CALCULATION (BAZETT): 463 MS
EKG R AXIS: 72 DEGREES
EKG T AXIS: 70 DEGREES
EKG VENTRICULAR RATE: 80 BPM
ERYTHROCYTE [DISTWIDTH] IN BLOOD BY AUTOMATED COUNT: 13.3 % (ref 11.5–14.5)
ERYTHROCYTE [DISTWIDTH] IN BLOOD BY AUTOMATED COUNT: 48.8 FL (ref 35–45)
GFR SERPL CREATININE-BSD FRML MDRD: > 90 ML/MIN/1.73M2
GLUCOSE BLD-MCNC: 98 MG/DL (ref 70–108)
HBA1C MFR BLD: 5.2 % (ref 4.4–6.4)
HCT VFR BLD CALC: 42.6 % (ref 42–52)
HEMOGLOBIN: 14.4 GM/DL (ref 14–18)
MAGNESIUM: 2 MG/DL (ref 1.6–2.4)
MCH RBC QN AUTO: 33.6 PG (ref 26–33)
MCHC RBC AUTO-ENTMCNC: 33.8 GM/DL (ref 32.2–35.5)
MCV RBC AUTO: 99.3 FL (ref 80–94)
PLATELET # BLD: 72 THOU/MM3 (ref 130–400)
PMV BLD AUTO: 9.7 FL (ref 9.4–12.4)
POTASSIUM REFLEX MAGNESIUM: 3.8 MEQ/L (ref 3.5–5.2)
RBC # BLD: 4.29 MILL/MM3 (ref 4.7–6.1)
SARS-COV-2, NAAT: NOT DETECTED
SODIUM BLD-SCNC: 138 MEQ/L (ref 135–145)
TOTAL PROTEIN: 6.9 G/DL (ref 6.1–8)
WBC # BLD: 3.2 THOU/MM3 (ref 4.8–10.8)

## 2022-01-17 PROCEDURE — 99225 PR SBSQ OBSERVATION CARE/DAY 25 MINUTES: CPT | Performed by: INTERNAL MEDICINE

## 2022-01-17 PROCEDURE — 2580000003 HC RX 258: Performed by: INTERNAL MEDICINE

## 2022-01-17 PROCEDURE — 85027 COMPLETE CBC AUTOMATED: CPT

## 2022-01-17 PROCEDURE — 6370000000 HC RX 637 (ALT 250 FOR IP): Performed by: INTERNAL MEDICINE

## 2022-01-17 PROCEDURE — 97161 PT EVAL LOW COMPLEX 20 MIN: CPT

## 2022-01-17 PROCEDURE — 80048 BASIC METABOLIC PNL TOTAL CA: CPT

## 2022-01-17 PROCEDURE — 6360000002 HC RX W HCPCS

## 2022-01-17 PROCEDURE — 83036 HEMOGLOBIN GLYCOSYLATED A1C: CPT

## 2022-01-17 PROCEDURE — 78452 HT MUSCLE IMAGE SPECT MULT: CPT

## 2022-01-17 PROCEDURE — 97116 GAIT TRAINING THERAPY: CPT

## 2022-01-17 PROCEDURE — G0378 HOSPITAL OBSERVATION PER HR: HCPCS

## 2022-01-17 PROCEDURE — 99214 OFFICE O/P EST MOD 30 MIN: CPT | Performed by: STUDENT IN AN ORGANIZED HEALTH CARE EDUCATION/TRAINING PROGRAM

## 2022-01-17 PROCEDURE — 93017 CV STRESS TEST TRACING ONLY: CPT | Performed by: INTERNAL MEDICINE

## 2022-01-17 PROCEDURE — 93010 ELECTROCARDIOGRAM REPORT: CPT | Performed by: NUCLEAR MEDICINE

## 2022-01-17 PROCEDURE — 87635 SARS-COV-2 COVID-19 AMP PRB: CPT

## 2022-01-17 PROCEDURE — 36415 COLL VENOUS BLD VENIPUNCTURE: CPT

## 2022-01-17 PROCEDURE — A9500 TC99M SESTAMIBI: HCPCS | Performed by: INTERNAL MEDICINE

## 2022-01-17 PROCEDURE — 83735 ASSAY OF MAGNESIUM: CPT

## 2022-01-17 PROCEDURE — 80076 HEPATIC FUNCTION PANEL: CPT

## 2022-01-17 PROCEDURE — 93005 ELECTROCARDIOGRAM TRACING: CPT | Performed by: INTERNAL MEDICINE

## 2022-01-17 PROCEDURE — 3430000000 HC RX DIAGNOSTIC RADIOPHARMACEUTICAL: Performed by: INTERNAL MEDICINE

## 2022-01-17 PROCEDURE — 6370000000 HC RX 637 (ALT 250 FOR IP): Performed by: PHYSICIAN ASSISTANT

## 2022-01-17 RX ORDER — LORAZEPAM 2 MG/ML
1 INJECTION INTRAMUSCULAR
Status: DISCONTINUED | OUTPATIENT
Start: 2022-01-17 | End: 2022-01-18 | Stop reason: HOSPADM

## 2022-01-17 RX ORDER — LORAZEPAM 2 MG/ML
2 INJECTION INTRAMUSCULAR
Status: DISCONTINUED | OUTPATIENT
Start: 2022-01-17 | End: 2022-01-18 | Stop reason: HOSPADM

## 2022-01-17 RX ORDER — LORAZEPAM 2 MG/ML
3 INJECTION INTRAMUSCULAR
Status: DISCONTINUED | OUTPATIENT
Start: 2022-01-17 | End: 2022-01-18 | Stop reason: HOSPADM

## 2022-01-17 RX ORDER — LORAZEPAM 1 MG/1
1 TABLET ORAL
Status: DISCONTINUED | OUTPATIENT
Start: 2022-01-17 | End: 2022-01-18 | Stop reason: HOSPADM

## 2022-01-17 RX ORDER — LORAZEPAM 2 MG/1
2 TABLET ORAL
Status: DISCONTINUED | OUTPATIENT
Start: 2022-01-17 | End: 2022-01-18 | Stop reason: HOSPADM

## 2022-01-17 RX ORDER — LORAZEPAM 2 MG/ML
4 INJECTION INTRAMUSCULAR
Status: DISCONTINUED | OUTPATIENT
Start: 2022-01-17 | End: 2022-01-18 | Stop reason: HOSPADM

## 2022-01-17 RX ORDER — LORAZEPAM 2 MG/1
4 TABLET ORAL
Status: DISCONTINUED | OUTPATIENT
Start: 2022-01-17 | End: 2022-01-18 | Stop reason: HOSPADM

## 2022-01-17 RX ADMIN — Medication 10 MILLICURIE: at 08:10

## 2022-01-17 RX ADMIN — FAMOTIDINE 20 MG: 20 TABLET ORAL at 20:14

## 2022-01-17 RX ADMIN — LORAZEPAM 1 MG: 1 TABLET ORAL at 20:14

## 2022-01-17 RX ADMIN — ZOLPIDEM TARTRATE 5 MG: 5 TABLET ORAL at 20:14

## 2022-01-17 RX ADMIN — ASPIRIN 81 MG 81 MG: 81 TABLET ORAL at 10:30

## 2022-01-17 RX ADMIN — SODIUM CHLORIDE: 9 INJECTION, SOLUTION INTRAVENOUS at 10:30

## 2022-01-17 RX ADMIN — LORAZEPAM 1 MG: 1 TABLET ORAL at 10:42

## 2022-01-17 RX ADMIN — CARVEDILOL 6.25 MG: 6.25 TABLET, FILM COATED ORAL at 10:31

## 2022-01-17 RX ADMIN — HYDRALAZINE HYDROCHLORIDE 50 MG: 50 TABLET, FILM COATED ORAL at 00:17

## 2022-01-17 RX ADMIN — HYDRALAZINE HYDROCHLORIDE 50 MG: 50 TABLET, FILM COATED ORAL at 16:19

## 2022-01-17 RX ADMIN — CARVEDILOL 6.25 MG: 6.25 TABLET, FILM COATED ORAL at 20:13

## 2022-01-17 RX ADMIN — AMLODIPINE BESYLATE 10 MG: 10 TABLET ORAL at 10:31

## 2022-01-17 RX ADMIN — FAMOTIDINE 20 MG: 20 TABLET ORAL at 10:30

## 2022-01-17 RX ADMIN — SODIUM CHLORIDE, PRESERVATIVE FREE 10 ML: 5 INJECTION INTRAVENOUS at 10:30

## 2022-01-17 RX ADMIN — Medication 34 MILLICURIE: at 08:55

## 2022-01-17 RX ADMIN — HYDRALAZINE HYDROCHLORIDE 50 MG: 50 TABLET, FILM COATED ORAL at 10:31

## 2022-01-17 RX ADMIN — LORAZEPAM 1 MG: 1 TABLET ORAL at 04:11

## 2022-01-17 ASSESSMENT — PAIN SCALES - GENERAL: PAINLEVEL_OUTOF10: 0

## 2022-01-17 NOTE — FLOWSHEET NOTE
01/17/22 1035   Provider Notification   Reason for Communication Review case;New orders   Provider Name [address]   Provider Notification Physician   Method of Communication Secure Message   Response See orders       Requested Physician for advance in diet, see orders.
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
## 2022-01-17 NOTE — PROGRESS NOTES
Cardiology Progress Note      Patient:  Ethan Bahena  YOB: 1955  MRN: [id number]   Acct: [de-identified]  Admit Date:  1/15/2022  Primary Cardiologist: none   Per Dr Natalya Pickens note:  \"REFERRING PROVIDER:  Hospitalist service.     HISTORY OF PRESENT ILLNESS:  This is a pleasant 58-year-old gentleman  who apparently has not had any obvious cardiac history, who has  underlying hypertension and multiple risk factors including smoking and  family history of coronary artery disease, comes in with episodes of  chest pain described as being sharp, non-radiating. No specific  triggers. Some associated shortness of breath. No coughing. He denies  having any COVID-19 infection lately. Denies any fever. Initial workup  revealed no obvious acute abnormality. We were consulted to assist in  the management. So far, cardiac enzymes were unremarkable. \"      Subjective (Events in last 24 hours):   Pt awake, alert. Feeling better today. Still tired. Does c/o some shaky feelings in the morning, wondering if it's any of his meds. Did mention the coreg can sometimes cause this, will monitor for now as we want to keep his BP under control. Seems to understand BP control and importance for limiting CHF risk factors. D/w patient about monitoring for stress test results and based on this, will decide further plan. Patient expressed understanding and agrees.      Objective:   BP (!) 141/93   Pulse 76   Temp 98.5 °F (36.9 °C)   Resp 18   Wt 180 lb (81.6 kg)   SpO2 96%   BMI 25.10 kg/m²      vss  TELEMETRY: nsr    Physical Exam:  General Appearance: alert and oriented to person, place and time, in no acute distress  Cardiovascular: normal rate, regular rhythm, normal S1 and S2, no murmurs, rubs, clicks, or gallops, distal pulses intact, no carotid bruits, no JVD  Pulmonary/Chest: clear to auscultation bilaterally- no wheezes, rales or rhonchi, normal air movement, no respiratory distress  Abdomen: soft, non-tender, non-distended, normal bowel sounds, no masses   Extremities: no cyanosis, clubbing or edema, pulse   Skin: warm and dry, no rash or erythema  Neurological: alert, oriented, normal speech, no focal findings or movement disorder noted    Medications:    sodium chloride flush  5-40 mL IntraVENous 2 times per day    famotidine  20 mg Oral BID    aspirin  81 mg Oral Daily    amLODIPine  10 mg Oral Daily    carvedilol  6.25 mg Oral BID    hydrALAZINE  50 mg Oral Q8H      sodium chloride      sodium chloride 60 mL/hr at 01/16/22 1636     LORazepam, 1 mg, Q1H PRN   Or  LORazepam, 1 mg, Q1H PRN   Or  LORazepam, 2 mg, Q1H PRN   Or  LORazepam, 2 mg, Q1H PRN   Or  LORazepam, 3 mg, Q1H PRN   Or  LORazepam, 3 mg, Q1H PRN   Or  LORazepam, 4 mg, Q1H PRN   Or  LORazepam, 4 mg, Q1H PRN  sodium chloride flush, 5-40 mL, PRN  sodium chloride, 25 mL, PRN  ondansetron, 4 mg, Q8H PRN   Or  ondansetron, 4 mg, Q6H PRN  acetaminophen, 650 mg, Q6H PRN   Or  acetaminophen, 650 mg, Q6H PRN  polyethylene glycol, 17 g, Daily PRN  perflutren lipid microspheres, 1.5 mL, ONCE PRN  zolpidem, 5 mg, Nightly PRN        Diagnostics:  EKG:   Normal sinus rhythm  Minimal voltage criteria for LVH, may be normal variant ( [place] product )  Cannot rule out Anterior infarct , age undetermined  Abnormal ECG  When compared with ECG of 16-JAN-2022 04:25,  No significant change was found  Confirmed by Jose M Perez ([id number]) on 1/17/2022   Echo:     Stress:     Left Heart Cath:     Lab Data:    Cardiac Enzymes:  No results for input(s): CKTOTAL, CKMB, CKMBINDEX, TROPONINI in the last 72 hours.     CBC:   Lab Results   Component Value Date    WBC 3.2 01/17/2022    RBC 4.29 01/17/2022    HGB 14.4 01/17/2022    HCT 42.6 01/17/2022    PLT 72 01/17/2022       CMP:    Lab Results   Component Value Date     01/17/2022    K 3.8 01/17/2022     01/17/2022    CO2 19 01/17/2022    BUN 12 01/17/2022    CREATININE 0.8 01/17/2022    LABGLOM >90 01/17/2022
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
## 2022-01-18 VITALS
SYSTOLIC BLOOD PRESSURE: 163 MMHG | TEMPERATURE: 97.8 F | OXYGEN SATURATION: 97 % | RESPIRATION RATE: 18 BRPM | HEART RATE: 74 BPM | BODY MASS INDEX: 25.1 KG/M2 | DIASTOLIC BLOOD PRESSURE: 90 MMHG | WEIGHT: 180 LBS

## 2022-01-18 LAB
ALBUMIN SERPL-MCNC: 3.3 G/DL (ref 3.5–5.1)
ALP BLD-CCNC: 103 U/L (ref 38–126)
ALT SERPL-CCNC: 45 U/L (ref 11–66)
ANION GAP SERPL CALCULATED.3IONS-SCNC: 11 MEQ/L (ref 8–16)
AST SERPL-CCNC: 71 U/L (ref 5–40)
BILIRUB SERPL-MCNC: 0.7 MG/DL (ref 0.3–1.2)
BILIRUBIN DIRECT: 0.3 MG/DL (ref 0–0.3)
BUN BLDV-MCNC: 16 MG/DL (ref 7–22)
CALCIUM SERPL-MCNC: 8.9 MG/DL (ref 8.5–10.5)
CHLORIDE BLD-SCNC: 111 MEQ/L (ref 98–111)
CO2: 18 MEQ/L (ref 23–33)
CREAT SERPL-MCNC: 0.8 MG/DL (ref 0.4–1.2)
GFR SERPL CREATININE-BSD FRML MDRD: > 90 ML/MIN/1.73M2
GLUCOSE BLD-MCNC: 107 MG/DL (ref 70–108)
POTASSIUM REFLEX MAGNESIUM: 3.9 MEQ/L (ref 3.5–5.2)
SODIUM BLD-SCNC: 140 MEQ/L (ref 135–145)
TOTAL PROTEIN: 6.7 G/DL (ref 6.1–8)

## 2022-01-18 PROCEDURE — 6370000000 HC RX 637 (ALT 250 FOR IP): Performed by: INTERNAL MEDICINE

## 2022-01-18 PROCEDURE — 80048 BASIC METABOLIC PNL TOTAL CA: CPT

## 2022-01-18 PROCEDURE — 2580000003 HC RX 258: Performed by: INTERNAL MEDICINE

## 2022-01-18 PROCEDURE — G0378 HOSPITAL OBSERVATION PER HR: HCPCS

## 2022-01-18 PROCEDURE — 80076 HEPATIC FUNCTION PANEL: CPT

## 2022-01-18 PROCEDURE — 6370000000 HC RX 637 (ALT 250 FOR IP): Performed by: PHYSICIAN ASSISTANT

## 2022-01-18 PROCEDURE — 99225 PR SBSQ OBSERVATION CARE/DAY 25 MINUTES: CPT | Performed by: INTERNAL MEDICINE

## 2022-01-18 PROCEDURE — 36415 COLL VENOUS BLD VENIPUNCTURE: CPT

## 2022-01-18 RX ORDER — ASPIRIN 81 MG/1
81 TABLET, CHEWABLE ORAL DAILY
Qty: 30 TABLET | Refills: 1 | Status: ON HOLD | OUTPATIENT
Start: 2022-01-18 | End: 2022-11-01

## 2022-01-18 RX ORDER — CARVEDILOL 6.25 MG/1
6.25 TABLET ORAL 2 TIMES DAILY
Qty: 60 TABLET | Refills: 1 | Status: SHIPPED | OUTPATIENT
Start: 2022-01-18 | End: 2022-01-20 | Stop reason: SDUPTHER

## 2022-01-18 RX ORDER — FAMOTIDINE 20 MG/1
20 TABLET, FILM COATED ORAL 2 TIMES DAILY
Qty: 60 TABLET | Refills: 1 | Status: ON HOLD | OUTPATIENT
Start: 2022-01-18 | End: 2022-11-01

## 2022-01-18 RX ORDER — AMLODIPINE BESYLATE 10 MG/1
10 TABLET ORAL DAILY
Qty: 30 TABLET | Refills: 1 | Status: SHIPPED | OUTPATIENT
Start: 2022-01-18 | End: 2022-01-20 | Stop reason: SDUPTHER

## 2022-01-18 RX ORDER — HYDRALAZINE HYDROCHLORIDE 50 MG/1
50 TABLET, FILM COATED ORAL EVERY 8 HOURS
Qty: 90 TABLET | Refills: 1 | Status: SHIPPED | OUTPATIENT
Start: 2022-01-18 | End: 2022-01-20 | Stop reason: SDUPTHER

## 2022-01-18 RX ADMIN — HYDRALAZINE HYDROCHLORIDE 50 MG: 50 TABLET, FILM COATED ORAL at 01:27

## 2022-01-18 RX ADMIN — FAMOTIDINE 20 MG: 20 TABLET ORAL at 08:35

## 2022-01-18 RX ADMIN — ASPIRIN 81 MG 81 MG: 81 TABLET ORAL at 08:35

## 2022-01-18 RX ADMIN — LORAZEPAM 1 MG: 1 TABLET ORAL at 08:40

## 2022-01-18 RX ADMIN — AMLODIPINE BESYLATE 10 MG: 10 TABLET ORAL at 08:35

## 2022-01-18 RX ADMIN — CARVEDILOL 6.25 MG: 6.25 TABLET, FILM COATED ORAL at 08:35

## 2022-01-18 RX ADMIN — SODIUM CHLORIDE: 9 INJECTION, SOLUTION INTRAVENOUS at 05:56

## 2022-01-18 RX ADMIN — HYDRALAZINE HYDROCHLORIDE 50 MG: 50 TABLET, FILM COATED ORAL at 08:34

## 2022-01-18 RX ADMIN — LORAZEPAM 2 MG: 2 TABLET ORAL at 01:27

## 2022-01-18 RX ADMIN — SODIUM CHLORIDE, PRESERVATIVE FREE 10 ML: 5 INJECTION INTRAVENOUS at 08:35

## 2022-01-18 ASSESSMENT — PAIN SCALES - GENERAL
PAINLEVEL_OUTOF10: 0
PAINLEVEL_OUTOF10: 0

## 2022-01-18 NOTE — PROGRESS NOTES
Discharge teaching and instructions for diagnosis/procedure of CARLY completed with patient using teachback method. AVS reviewed. Prescriptions sent to pharmacy. Patient voiced understanding regarding prescriptions, follow up appointments, and care of self at home. Discharged in a wheelchair to family member's residence.

## 2022-01-18 NOTE — FLOWSHEET NOTE
01/18/22 1320   Encounter Summary   Services provided to: Patient   Referral/Consult From: Rounding   Continue Visiting Yes  (1/18 Pt might be discharged)   Complexity of Encounter Low   Length of Encounter 15 minutes   Spiritual Assessment Completed Yes   Routine   Type Initial   Assessment Calm; Approachable   Intervention Nurtured hope   Outcome Comfort;Expressed gratitude   Assessment: In my encounter with the 77 yr old patient the pts family was supportively present. While rounding the unit 6K,  I provided spiritual care to patient and their family through conversation, I also came to assess their spiritual needs present. The pt was admitted due to chest pains. Interventions:  I provided, prayer, emotional support and words of comfort.  provided a listening presence and encouraged pt to share their beliefs and how they support him during their hospitalization. Outcomes: The patient was encouraged and didnt share any further spiritual needs at this time. The pt remains optimistic and hopeful. The pt shared that they were appreciative for the support. Plan:  Chaplains will follow-up at a later time for assessment of any spiritual care needs present. The pt is hoping to be discharged.
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
## 2022-01-19 ENCOUNTER — HOSPITAL ENCOUNTER (EMERGENCY)
Age: 67
Discharge: HOME | End: 2022-01-20
Attending: EMERGENCY MEDICINE
Payer: MEDICARE

## 2022-01-19 ENCOUNTER — APPOINTMENT (OUTPATIENT)
Dept: GENERAL RADIOLOGY | Age: 67
End: 2022-01-19
Payer: MEDICARE

## 2022-01-19 DIAGNOSIS — K29.20 ACUTE ALCOHOLIC GASTRITIS WITHOUT HEMORRHAGE: Primary | ICD-10-CM

## 2022-01-19 DIAGNOSIS — F10.920 ACUTE ALCOHOLIC INTOXICATION WITHOUT COMPLICATION (HCC): ICD-10-CM

## 2022-01-19 LAB
ALBUMIN SERPL-MCNC: 3.6 G/DL (ref 3.5–5.1)
ALP BLD-CCNC: 113 U/L (ref 38–126)
ALT SERPL-CCNC: 71 U/L (ref 11–66)
AMMONIA: 36 UMOL/L (ref 11–60)
ANION GAP SERPL CALCULATED.3IONS-SCNC: 15 MEQ/L (ref 8–16)
APTT: 31.3 SECONDS (ref 22–38)
AST SERPL-CCNC: 116 U/L (ref 5–40)
BASOPHILS # BLD: 0.4 %
BASOPHILS ABSOLUTE: 0 THOU/MM3 (ref 0–0.1)
BILIRUB SERPL-MCNC: 0.6 MG/DL (ref 0.3–1.2)
BILIRUBIN DIRECT: 0.3 MG/DL (ref 0–0.3)
BUN BLDV-MCNC: 16 MG/DL (ref 7–22)
CALCIUM SERPL-MCNC: 9.5 MG/DL (ref 8.5–10.5)
CHLORIDE BLD-SCNC: 105 MEQ/L (ref 98–111)
CO2: 19 MEQ/L (ref 23–33)
CREAT SERPL-MCNC: 1 MG/DL (ref 0.4–1.2)
EOSINOPHIL # BLD: 3.9 %
EOSINOPHILS ABSOLUTE: 0.2 THOU/MM3 (ref 0–0.4)
ERYTHROCYTE [DISTWIDTH] IN BLOOD BY AUTOMATED COUNT: 13.2 % (ref 11.5–14.5)
ERYTHROCYTE [DISTWIDTH] IN BLOOD BY AUTOMATED COUNT: 48.6 FL (ref 35–45)
ETHYL ALCOHOL, SERUM: 0.21 %
GFR SERPL CREATININE-BSD FRML MDRD: 75 ML/MIN/1.73M2
GLUCOSE BLD-MCNC: 109 MG/DL (ref 70–108)
HCT VFR BLD CALC: 41.4 % (ref 42–52)
HEMOGLOBIN: 13.8 GM/DL (ref 14–18)
IMMATURE GRANS (ABS): 0.03 THOU/MM3 (ref 0–0.07)
IMMATURE GRANULOCYTES: 0.6 %
INR BLD: 1.12 (ref 0.85–1.13)
LIPASE: 116 U/L (ref 5.6–51.3)
LYMPHOCYTES # BLD: 31 %
LYMPHOCYTES ABSOLUTE: 1.7 THOU/MM3 (ref 1–4.8)
MAGNESIUM: 2 MG/DL (ref 1.6–2.4)
MCH RBC QN AUTO: 33.3 PG (ref 26–33)
MCHC RBC AUTO-ENTMCNC: 33.3 GM/DL (ref 32.2–35.5)
MCV RBC AUTO: 99.8 FL (ref 80–94)
MONOCYTES # BLD: 15.4 %
MONOCYTES ABSOLUTE: 0.8 THOU/MM3 (ref 0.4–1.3)
NUCLEATED RED BLOOD CELLS: 0 /100 WBC
OSMOLALITY CALCULATION: 279.3 MOSMOL/KG (ref 275–300)
PLATELET # BLD: 101 THOU/MM3 (ref 130–400)
PMV BLD AUTO: 9.4 FL (ref 9.4–12.4)
POTASSIUM SERPL-SCNC: 3.4 MEQ/L (ref 3.5–5.2)
PRO-BNP: 101.1 PG/ML (ref 0–900)
RBC # BLD: 4.15 MILL/MM3 (ref 4.7–6.1)
SARS-COV-2, NAAT: NOT  DETECTED
SEG NEUTROPHILS: 48.7 %
SEGMENTED NEUTROPHILS ABSOLUTE COUNT: 2.7 THOU/MM3 (ref 1.8–7.7)
SODIUM BLD-SCNC: 139 MEQ/L (ref 135–145)
TOTAL PROTEIN: 7.4 G/DL (ref 6.1–8)
TROPONIN T: < 0.01 NG/ML
WBC # BLD: 5.5 THOU/MM3 (ref 4.8–10.8)

## 2022-01-19 PROCEDURE — 85025 COMPLETE CBC W/AUTO DIFF WBC: CPT

## 2022-01-19 PROCEDURE — 96374 THER/PROPH/DIAG INJ IV PUSH: CPT

## 2022-01-19 PROCEDURE — 80307 DRUG TEST PRSMV CHEM ANLYZR: CPT

## 2022-01-19 PROCEDURE — 83880 ASSAY OF NATRIURETIC PEPTIDE: CPT

## 2022-01-19 PROCEDURE — 93005 ELECTROCARDIOGRAM TRACING: CPT | Performed by: EMERGENCY MEDICINE

## 2022-01-19 PROCEDURE — 84484 ASSAY OF TROPONIN QUANT: CPT

## 2022-01-19 PROCEDURE — 2580000003 HC RX 258: Performed by: EMERGENCY MEDICINE

## 2022-01-19 PROCEDURE — 80053 COMPREHEN METABOLIC PANEL: CPT

## 2022-01-19 PROCEDURE — 87635 SARS-COV-2 COVID-19 AMP PRB: CPT

## 2022-01-19 PROCEDURE — 83735 ASSAY OF MAGNESIUM: CPT

## 2022-01-19 PROCEDURE — 83690 ASSAY OF LIPASE: CPT

## 2022-01-19 PROCEDURE — 36415 COLL VENOUS BLD VENIPUNCTURE: CPT

## 2022-01-19 PROCEDURE — 82140 ASSAY OF AMMONIA: CPT

## 2022-01-19 PROCEDURE — 82248 BILIRUBIN DIRECT: CPT

## 2022-01-19 PROCEDURE — 81003 URINALYSIS AUTO W/O SCOPE: CPT

## 2022-01-19 PROCEDURE — 85610 PROTHROMBIN TIME: CPT

## 2022-01-19 PROCEDURE — 96375 TX/PRO/DX INJ NEW DRUG ADDON: CPT

## 2022-01-19 PROCEDURE — 85730 THROMBOPLASTIN TIME PARTIAL: CPT

## 2022-01-19 PROCEDURE — 82077 ASSAY SPEC XCP UR&BREATH IA: CPT

## 2022-01-19 PROCEDURE — 71045 X-RAY EXAM CHEST 1 VIEW: CPT

## 2022-01-19 PROCEDURE — 99284 EMERGENCY DEPT VISIT MOD MDM: CPT

## 2022-01-19 PROCEDURE — C9113 INJ PANTOPRAZOLE SODIUM, VIA: HCPCS | Performed by: EMERGENCY MEDICINE

## 2022-01-19 PROCEDURE — 6370000000 HC RX 637 (ALT 250 FOR IP): Performed by: EMERGENCY MEDICINE

## 2022-01-19 PROCEDURE — 6360000002 HC RX W HCPCS: Performed by: EMERGENCY MEDICINE

## 2022-01-19 RX ORDER — 0.9 % SODIUM CHLORIDE 0.9 %
1000 INTRAVENOUS SOLUTION INTRAVENOUS ONCE
Status: COMPLETED | OUTPATIENT
Start: 2022-01-19 | End: 2022-01-19

## 2022-01-19 RX ORDER — SODIUM CHLORIDE 9 MG/ML
10 INJECTION INTRAVENOUS ONCE
Status: COMPLETED | OUTPATIENT
Start: 2022-01-19 | End: 2022-01-19

## 2022-01-19 RX ORDER — ONDANSETRON 2 MG/ML
4 INJECTION INTRAMUSCULAR; INTRAVENOUS ONCE
Status: COMPLETED | OUTPATIENT
Start: 2022-01-19 | End: 2022-01-19

## 2022-01-19 RX ORDER — PANTOPRAZOLE SODIUM 40 MG/10ML
40 INJECTION, POWDER, LYOPHILIZED, FOR SOLUTION INTRAVENOUS ONCE
Status: COMPLETED | OUTPATIENT
Start: 2022-01-19 | End: 2022-01-19

## 2022-01-19 RX ADMIN — ONDANSETRON 4 MG: 2 INJECTION INTRAMUSCULAR; INTRAVENOUS at 22:48

## 2022-01-19 RX ADMIN — SODIUM CHLORIDE, PRESERVATIVE FREE 10 ML: 5 INJECTION INTRAVENOUS at 22:48

## 2022-01-19 RX ADMIN — PANTOPRAZOLE SODIUM 40 MG: 40 INJECTION, POWDER, FOR SOLUTION INTRAVENOUS at 22:48

## 2022-01-19 RX ADMIN — SODIUM CHLORIDE 1000 ML: 9 INJECTION, SOLUTION INTRAVENOUS at 22:48

## 2022-01-19 RX ADMIN — LIDOCAINE HYDROCHLORIDE: 20 SOLUTION ORAL; TOPICAL at 23:32

## 2022-01-19 ASSESSMENT — ENCOUNTER SYMPTOMS
BLOOD IN STOOL: 0
COUGH: 0
TROUBLE SWALLOWING: 0
EYE ITCHING: 0
ABDOMINAL DISTENTION: 0
VOICE CHANGE: 0
RHINORRHEA: 0
BACK PAIN: 0
PHOTOPHOBIA: 0
DIARRHEA: 0
CHOKING: 0
SINUS PRESSURE: 0
CONSTIPATION: 0
EYE REDNESS: 0
CHEST TIGHTNESS: 0
SORE THROAT: 0
VOMITING: 1
EYE DISCHARGE: 0
WHEEZING: 0
NAUSEA: 1
ABDOMINAL PAIN: 1
SHORTNESS OF BREATH: 0
EYE PAIN: 0

## 2022-01-19 ASSESSMENT — PAIN DESCRIPTION - LOCATION: LOCATION: HEAD

## 2022-01-19 ASSESSMENT — PAIN DESCRIPTION - DESCRIPTORS: DESCRIPTORS: ACHING

## 2022-01-19 ASSESSMENT — PAIN SCALES - GENERAL: PAINLEVEL_OUTOF10: 5

## 2022-01-19 ASSESSMENT — PAIN DESCRIPTION - PAIN TYPE: TYPE: ACUTE PAIN

## 2022-01-20 VITALS
HEIGHT: 71 IN | BODY MASS INDEX: 25.76 KG/M2 | WEIGHT: 184 LBS | SYSTOLIC BLOOD PRESSURE: 144 MMHG | RESPIRATION RATE: 16 BRPM | OXYGEN SATURATION: 92 % | TEMPERATURE: 98.4 F | DIASTOLIC BLOOD PRESSURE: 89 MMHG | HEART RATE: 85 BPM

## 2022-01-20 LAB
AMPHETAMINE+METHAMPHETAMINE URINE SCREEN: NEGATIVE
BARBITURATE QUANTITATIVE URINE: NEGATIVE
BENZODIAZEPINE QUANTITATIVE URINE: NEGATIVE
BILIRUBIN URINE: NEGATIVE
BLOOD, URINE: NEGATIVE
CANNABINOID QUANTITATIVE URINE: NEGATIVE
CHARACTER, URINE: CLEAR
COCAINE METABOLITE QUANTITATIVE URINE: NEGATIVE
COLOR: YELLOW
EKG ATRIAL RATE: 92 BPM
EKG P AXIS: 76 DEGREES
EKG P-R INTERVAL: 178 MS
EKG Q-T INTERVAL: 360 MS
EKG QRS DURATION: 108 MS
EKG QTC CALCULATION (BAZETT): 445 MS
EKG R AXIS: 65 DEGREES
EKG T AXIS: 69 DEGREES
EKG VENTRICULAR RATE: 92 BPM
GLUCOSE URINE: NEGATIVE MG/DL
KETONES, URINE: NEGATIVE
LEUKOCYTE ESTERASE, URINE: NEGATIVE
NITRITE, URINE: NEGATIVE
OPIATES, URINE: NEGATIVE
OXYCODONE: NEGATIVE
PH UA: 5 (ref 5–9)
PHENCYCLIDINE QUANTITATIVE URINE: NEGATIVE
PROTEIN UA: NEGATIVE
SPECIFIC GRAVITY, URINE: 1.01 (ref 1–1.03)
TROPONIN T: < 0.01 NG/ML
UROBILINOGEN, URINE: 0.2 EU/DL (ref 0–1)

## 2022-01-20 PROCEDURE — 93010 ELECTROCARDIOGRAM REPORT: CPT | Performed by: NUCLEAR MEDICINE

## 2022-01-20 PROCEDURE — 84484 ASSAY OF TROPONIN QUANT: CPT

## 2022-01-20 RX ORDER — SUCRALFATE 1 G/1
1 TABLET ORAL ONCE
Status: DISCONTINUED | OUTPATIENT
Start: 2022-01-20 | End: 2022-01-20 | Stop reason: HOSPADM

## 2022-01-20 RX ORDER — AMLODIPINE BESYLATE 10 MG/1
10 TABLET ORAL DAILY
Qty: 30 TABLET | Refills: 1 | Status: ON HOLD | OUTPATIENT
Start: 2022-01-20

## 2022-01-20 RX ORDER — ONDANSETRON 4 MG/1
4 TABLET, ORALLY DISINTEGRATING ORAL 3 TIMES DAILY PRN
Qty: 21 TABLET | Refills: 0 | Status: ON HOLD | OUTPATIENT
Start: 2022-01-20 | End: 2022-11-01

## 2022-01-20 RX ORDER — SUCRALFATE 1 G/1
1 TABLET ORAL 4 TIMES DAILY
Qty: 120 TABLET | Refills: 3 | Status: ON HOLD | OUTPATIENT
Start: 2022-01-20 | End: 2022-11-01

## 2022-01-20 RX ORDER — HYDRALAZINE HYDROCHLORIDE 50 MG/1
50 TABLET, FILM COATED ORAL EVERY 8 HOURS
Qty: 90 TABLET | Refills: 1 | Status: ON HOLD | OUTPATIENT
Start: 2022-01-20 | End: 2022-11-01

## 2022-01-20 RX ORDER — CALCIUM CARBONATE 200(500)MG
1 TABLET,CHEWABLE ORAL DAILY
Qty: 30 TABLET | Refills: 0 | Status: SHIPPED | OUTPATIENT
Start: 2022-01-20 | End: 2022-02-19

## 2022-01-20 RX ORDER — CARVEDILOL 6.25 MG/1
6.25 TABLET ORAL 2 TIMES DAILY
Qty: 60 TABLET | Refills: 1 | Status: ON HOLD | OUTPATIENT
Start: 2022-01-20 | End: 2022-11-01

## 2022-01-20 RX ORDER — PANTOPRAZOLE SODIUM 40 MG/1
40 TABLET, DELAYED RELEASE ORAL
Qty: 30 TABLET | Refills: 0 | Status: ON HOLD | OUTPATIENT
Start: 2022-01-20 | End: 2022-11-01 | Stop reason: DRUGHIGH

## 2022-01-20 NOTE — ED TRIAGE NOTES
Pt presents to the ED from home via EMS with complaints of hematemesis and chest pain. Pt states chest pain has been going on about a week. Pt states he was recently admitted and discharged from the hospital and was diagnosed with liver cirrhosis. Pt states he had a nose bleed today and vomited blood two times. Pt states he has a headache, 5/10. Pt states he drank 2 beers today. Pt is alert and oriented x 4, with respirations even and unlabored.

## 2022-01-20 NOTE — ED NOTES
Pt is resting in cot with respirations even and unlabored. Pt is trying to jeannette brother at this time to come and pick him up. Will continue to monitor.       Saranya Randall RN  01/20/22 2413

## 2022-01-20 NOTE — ED NOTES
Pt is resting in cot with eyes closed, respirations even and unlabored. RN gave pt a blanket and turned off lights for comfort. Pt voices no concern or need at this time. Call light is within reach. Will continue to monitor.       Carmela Moffett RN  01/20/22 3984
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
## 2022-01-20 NOTE — ED NOTES
RN talked with Dr. Christina Horn about pt status. RN updated pt on POC. Pt is requesting to leave at this time and wants to go home. RN informed pt about policy that pt has to be sober before he can go home unless he has a sober  to pick him up. Pt does not have  to pick him up at this time.       Tawny De La Rosa, [place]  01/20/22 9822

## 2022-01-20 NOTE — ED NOTES
Pt is resting in cot with eyes closed respirations even and unlabored. RN lowered HOB for pt. Pt voices no concern or need at this time. Call light is within reach. Will continue to monitor.       Meggan Castillo RN  01/20/22 9308

## 2022-01-25 ENCOUNTER — TELEPHONE (OUTPATIENT)
Dept: CARDIOLOGY CLINIC | Age: 67
End: 2022-01-25

## 2022-02-15 NOTE — DISCHARGE SUMMARY
Hospital Medicine Discharge Summary      Patient Identification:   Tin Ann   : 1955  MRN: [id number]   Account: [de-identified]      Patient's PCP: Cynthia Mac MD    Admit Date: 1/15/2022     Discharge Date: 2022      Admitting Physician: Meg Lewis DO     Discharge Physician: Meg Lewis DO     Discharge Diagnoses: Active Hospital Problems    Diagnosis Date Noted    Chest pain [R07.9] 2022       The patient was seen and examined on day of discharge and this discharge summary is in conjunction with any daily progress note from day of discharge. Hospital Course: Tin Ann is a 79 y.o. male admitted to [facility] on 1/15/2022 for chest pain. Assessment and Plan:        1. Chest pain we will obtain serial troponins, may need noninvasive stress test we will consult cardiology, placed on telemetry, echocardiogram  2. Disequilibrium secondary to TBI, may have PT work with patient  3. Thrombocytopenia we will trend, will clarify again his alcohol consumption  4. transaminitis will trend may be secondary to alcohol consumption, fatty liver, medication  5. Emphysema will need follow-up with pulmonology on outpatient basis for PFTs. 6. essential hypertension we will resume home medication.        2022 patient seen this a.m. question about alcohol consumption states he does drink usually no more than a couple 24 ounce beers a day. Appreciate cardiology seeing the patient. Patient is COVID-19 negative he can undergo stress test per cardiology note.     2022 patient seen this a.m. resting comfortably. Stress test resulted. Possible discharge today if okay with cardiology.       Exam:     Vitals:  Vitals:    22 0306 22 0827 22 1053 22 1445   BP: 129/79 (!) 148/105 (!) 163/80 (!) 163/90   Pulse: 80 88 76 74   Resp:    Temp: 98.5 °F (36.9 °C) 97.9 °F (36.6 °C) 98.5 °F (36.9 °C) 97.8 °F (36.6 °C)   TempSrc: Oral Oral Oral Oral   SpO2: 95% 97% 96% 97%   Weight:         Weight: Weight: 180 lb (81.6 kg)     24 hour intake/output:No intake or output data in the 24 hours ending 02/15/22 1539      General appearance:  No apparent distress, appears stated age and cooperative. HEENT:  Normal cephalic, atraumatic without obvious deformity. Pupils equal, round, and reactive to light. Extra ocular muscles intact. Conjunctivae/corneas clear. Neck: Supple, with full range of motion. No jugular venous distention. Trachea midline. Respiratory:  Normal respiratory effort. Clear to auscultation, bilaterally without Rales/Wheezes/Rhonchi. Cardiovascular:  Regular rate and rhythm with normal S1/S2 without murmurs, rubs or gallops. Abdomen: Soft, non-tender, non-distended with normal bowel sounds. Musculoskeletal:  No clubbing, cyanosis or edema bilaterally. Full range of motion without deformity. Skin: Skin color, texture, turgor normal.  No rashes or lesions. Neurologic:  Neurovascularly intact without any focal sensory/motor deficits. Cranial nerves: II-XII intact, grossly non-focal.  Psychiatric:  Alert and oriented, thought content appropriate, normal insight  Capillary Refill: Brisk,< 3 seconds   Peripheral Pulses: +2 palpable, equal bilaterally       Labs: For convenience and continuity at follow-up the following most recent labs are provided:      CBC:    Lab Results   Component Value Date    WBC 5.5 01/19/2022    HGB 13.8 01/19/2022    HCT 41.4 01/19/2022     01/19/2022       Renal:    Lab Results   Component Value Date     01/19/2022    K 3.4 01/19/2022    K 3.9 01/18/2022     01/19/2022    CO2 19 01/19/2022    BUN 16 01/19/2022    CREATININE 1.0 01/19/2022    CALCIUM 9.5 01/19/2022    PHOS 3.5 07/03/2012         Significant Diagnostic Studies    Radiology:   CT HEAD WO CONTRAST   Final Result   Impression:   No acute intracranial findings. This document has been electronically signed by: Ramana Zarate. Barrett Gil MD    on 01/16/2022 03:16 AM      All CTs at this facility use dose modulation techniques and iterative    reconstructions, and/or weight-based dosing   when appropriate to reduce radiation to a low as reasonably achievable. CT CHEST W CONTRAST   Final Result   Impression:   No acute findings. Substantial paraseptal emphysema with advanced destructive centrilobular    emphysema and mild bronchial wall thickening likely indicates COPD. Hemoptysis can be seen in the setting of bronchial wall    thickening/bronchitis. This document has been electronically signed by: Rosales Gil MD    on 01/16/2022 03:25 AM      All CTs at this facility use dose modulation techniques and iterative    reconstructions, and/or weight-based dosing   when appropriate to reduce radiation to a low as reasonably achievable. CT ABDOMEN PELVIS W IV CONTRAST Additional Contrast? None   Final Result   Impression:   No acute findings. Nodular contour of the liver capsule likely indicates cirrhosis. No    varices or ascites. 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 This document has been electronically signed by: Rosales Martell. Barrett Gil MD    on 01/16/2022 03:32 AM      All CTs at this facility use dose modulation techniques and iterative    reconstructions, and/or weight-based dosing   when appropriate to reduce radiation to a low as reasonably achievable. NM MYOCARDIAL SPECT REST EXERCISE OR RX    (Results Pending)          Consults:     IP CONSULT TO CARDIOLOGY  IP CONSULT TO HOME CARE NEEDS    Disposition:    [x] Home       [] TCU       [] Rehab       [] Psych       [] SNF       [] [place]       [] Other-    Condition at Discharge: Stable    Code Status:  Prior     Patient Instructions:    Discharge lab work:    Activity: activity as tolerated  Diet: No diet orders on file      Follow-up visits:   [address], MD  [address]

## 2022-10-24 ENCOUNTER — HOSPITAL ENCOUNTER (OUTPATIENT)
Age: 67
Setting detail: SPECIMEN
Discharge: HOME | End: 2022-10-24

## 2022-10-24 PROCEDURE — 80061 LIPID PANEL: CPT

## 2022-10-24 PROCEDURE — 36415 COLL VENOUS BLD VENIPUNCTURE: CPT

## 2022-10-25 LAB
CHOLESTEROL/HDL RATIO: 4.9
CHOLESTEROL: 131 MG/DL
HDLC SERPL-MCNC: 27 MG/DL
LDL CHOLESTEROL: 80 MG/DL (ref 0–130)
TRIGL SERPL-MCNC: 118 MG/DL

## 2022-10-29 ENCOUNTER — APPOINTMENT (OUTPATIENT)
Dept: GENERAL RADIOLOGY | Age: 67
DRG: 640 | End: 2022-10-29
Payer: MEDICARE

## 2022-10-29 ENCOUNTER — APPOINTMENT (OUTPATIENT)
Dept: MRI IMAGING | Age: 67
DRG: 640 | End: 2022-10-29
Payer: MEDICARE

## 2022-10-29 ENCOUNTER — HOSPITAL ENCOUNTER (INPATIENT)
Age: 67
LOS: 13 days | Discharge: NURSING, REHAB OR LONG-TERM CARE | DRG: 640 | End: 2022-11-11
Attending: INTERNAL MEDICINE | Admitting: INTERNAL MEDICINE
Payer: MEDICARE

## 2022-10-29 ENCOUNTER — APPOINTMENT (OUTPATIENT)
Dept: CT IMAGING | Age: 67
DRG: 640 | End: 2022-10-29
Payer: MEDICARE

## 2022-10-29 DIAGNOSIS — F41.9 ANXIETY: ICD-10-CM

## 2022-10-29 DIAGNOSIS — E87.0 HYPERNATREMIA: ICD-10-CM

## 2022-10-29 DIAGNOSIS — R41.82 ALTERED MENTAL STATUS, UNSPECIFIED ALTERED MENTAL STATUS TYPE: Primary | ICD-10-CM
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
## 2022-10-29 PROBLEM — G93.41 ACUTE METABOLIC ENCEPHALOPATHY: Status: ACTIVE | Noted: 2022-10-29
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
## 2022-10-29 LAB
ACINETOBACTER CALCOACETICUS-BAUMANNII BY PCR: NOT DETECTED
ADENOVIRUS BY PCR: NOT DETECTED
ALBUMIN SERPL-MCNC: 3.3 G/DL (ref 3.5–5.1)
ALP BLD-CCNC: 166 U/L (ref 38–126)
ALT SERPL-CCNC: 34 U/L (ref 11–66)
AMMONIA: 51 UMOL/L (ref 11–60)
AMPHETAMINE+METHAMPHETAMINE URINE SCREEN: NEGATIVE
ANION GAP SERPL CALCULATED.3IONS-SCNC: 13 MEQ/L (ref 8–16)
ANION GAP SERPL CALCULATED.3IONS-SCNC: 13 MEQ/L (ref 8–16)
APTT: 32.4 SECONDS (ref 22–38)
AST SERPL-CCNC: 56 U/L (ref 5–40)
BARBITURATE QUANTITATIVE URINE: NEGATIVE
BASOPHILS # BLD: 0.2 %
BASOPHILS ABSOLUTE: 0 THOU/MM3 (ref 0–0.1)
BENZODIAZEPINE QUANTITATIVE URINE: NEGATIVE
BILIRUB SERPL-MCNC: 0.5 MG/DL (ref 0.3–1.2)
BILIRUBIN URINE: NEGATIVE
BLOOD, URINE: NEGATIVE
BUN BLDV-MCNC: 31 MG/DL (ref 7–22)
BUN BLDV-MCNC: 38 MG/DL (ref 7–22)
C-REACTIVE PROTEIN: < 0.3 MG/DL (ref 0–1)
CALCIUM IONIZED: 1.22 MMOL/L (ref 1.12–1.32)
CALCIUM SERPL-MCNC: 8.7 MG/DL (ref 8.5–10.5)
CALCIUM SERPL-MCNC: 9.8 MG/DL (ref 8.5–10.5)
CANNABINOID QUANTITATIVE URINE: NEGATIVE
CHARACTER, URINE: CLEAR
CHLAMYDIA PNEUMONIAE BY PCR: NOT DETECTED
CHLORIDE BLD-SCNC: 121 MEQ/L (ref 98–111)
CHLORIDE BLD-SCNC: 127 MEQ/L (ref 98–111)
CO2: 16 MEQ/L (ref 23–33)
CO2: 19 MEQ/L (ref 23–33)
COCAINE METABOLITE QUANTITATIVE URINE: NEGATIVE
COLOR: YELLOW
CREAT SERPL-MCNC: 1.5 MG/DL (ref 0.4–1.2)
CREAT SERPL-MCNC: 1.7 MG/DL (ref 0.4–1.2)
ENTEROBACTER CLOACAE COMPLEX BY PCR: NOT DETECTED
EOSINOPHIL # BLD: 1.8 %
EOSINOPHILS ABSOLUTE: 0.1 THOU/MM3 (ref 0–0.4)
ERYTHROCYTE [DISTWIDTH] IN BLOOD BY AUTOMATED COUNT: 15.5 % (ref 11.5–14.5)
ERYTHROCYTE [DISTWIDTH] IN BLOOD BY AUTOMATED COUNT: 51.3 FL (ref 35–45)
ESCHERICHIA COLI BY PCR: DETECTED
ETHYL ALCOHOL, SERUM: < 0.01 %
FENTANYL: NEGATIVE
FOLATE: > 20 NG/ML (ref 4.8–24.2)
GFR SERPL CREATININE-BSD FRML MDRD: 43 ML/MIN/1.73M2
GFR SERPL CREATININE-BSD FRML MDRD: 50 ML/MIN/1.73M2
GLUCOSE BLD-MCNC: 103 MG/DL (ref 70–108)
GLUCOSE BLD-MCNC: 82 MG/DL (ref 70–108)
GLUCOSE URINE: NEGATIVE MG/DL
HAEMOPHILUS INFLUENZAE BY PCR: DETECTED
HCT VFR BLD CALC: 34.6 % (ref 42–52)
HEMOGLOBIN: 10.5 GM/DL (ref 14–18)
IMMATURE GRANS (ABS): 0.01 THOU/MM3 (ref 0–0.07)
IMMATURE GRANULOCYTES: 0.2 %
INFLUENZA A BY PCR: NOT DETECTED
INFLUENZA B BY PCR: NOT DETECTED
INR BLD: 1.21 (ref 0.85–1.13)
KETONES, URINE: ABNORMAL
KLEBSIELLA AEROGENES BY PCR: NOT DETECTED
KLEBSIELLA OXYTOCA BY PCR: NOT DETECTED
KLEBSIELLA PNEUMONIAE GROUP BY PCR: NOT DETECTED
LEGIONELLA PNEUMOPHILIA BY PCR: NOT DETECTED
LEUKOCYTE ESTERASE, URINE: NEGATIVE
LIPASE: 68.1 U/L (ref 5.6–51.3)
LYMPHOCYTES # BLD: 22.6 %
LYMPHOCYTES ABSOLUTE: 1.3 THOU/MM3 (ref 1–4.8)
MAGNESIUM: 2.4 MG/DL (ref 1.6–2.4)
MCH RBC QN AUTO: 27.8 PG (ref 26–33)
MCHC RBC AUTO-ENTMCNC: 30.3 GM/DL (ref 32.2–35.5)
MCV RBC AUTO: 91.5 FL (ref 80–94)
METAPNEUMOVIRUS BY PCR: NOT DETECTED
MONOCYTES # BLD: 10.5 %
MONOCYTES ABSOLUTE: 0.6 THOU/MM3 (ref 0.4–1.3)
MORAXELLA CATARRHALIS BY PCR: NOT DETECTED
MYCOPLASMA PNEUMONIAE BY PCR: NOT DETECTED
NITRITE, URINE: NEGATIVE
NON-SARS CORONAVIRUS: NOT DETECTED
NUCLEATED RED BLOOD CELLS: 0 /100 WBC
OPIATES, URINE: NEGATIVE
OSMOLALITY CALCULATION: 312.9 MOSMOL/KG (ref 275–300)
OSMOLALITY URINE: 710 MOSMOL/KG (ref 250–750)
OSMOLALITY: 339 MOSMOL/KG (ref 275–295)
OXYCODONE: NEGATIVE
PARAINFLUENZA VIRUS BY PCR: NOT DETECTED
PH UA: 5.5 (ref 5–9)
PH VENOUS: 7.36 (ref 7.31–7.41)
PHENCYCLIDINE QUANTITATIVE URINE: NEGATIVE
PLATELET # BLD: 113 THOU/MM3 (ref 130–400)
PMV BLD AUTO: 11 FL (ref 9.4–12.4)
POTASSIUM REFLEX MAGNESIUM: 3.3 MEQ/L (ref 3.5–5.2)
POTASSIUM SERPL-SCNC: 3.5 MEQ/L (ref 3.5–5.2)
PROTEIN UA: NEGATIVE
PROTEUS SPECIES BY PCR: NOT DETECTED
PSEUDOMONAS AERUGINOSA BY PCR: NOT DETECTED
RBC # BLD: 3.78 MILL/MM3 (ref 4.7–6.1)
RESISTANT GENE CTX-M BY PCR: NOT DETECTED
RESISTANT GENE IMP BY PCR: NOT DETECTED
RESISTANT GENE KPC BY PCR: NOT DETECTED
RESISTANT GENE MECA/C & MREJ BY PCR: NOT DETECTED
RESISTANT GENE NDM BY PCR: NOT DETECTED
RESISTANT GENE OXA-48-LIKE BY PCR: NOT DETECTED
RESISTANT GENE VIM BY PCR: NOT DETECTED
RESPIRATORY SYNCYTIAL VIRUS BY PCR: NOT DETECTED
RHINOVIRUS ENTEROVIRUS PCR: NOT DETECTED
SEDIMENTATION RATE, ERYTHROCYTE: 67 MM/HR (ref 0–10)
SEG NEUTROPHILS: 64.7 %
SEGMENTED NEUTROPHILS ABSOLUTE COUNT: 3.6 THOU/MM3 (ref 1.8–7.7)
SERRATIA MARCESCENS BY PCR: NOT DETECTED
SODIUM BLD-SCNC: 153 MEQ/L (ref 135–145)
SODIUM BLD-SCNC: 156 MEQ/L (ref 135–145)
SODIUM URINE: 37 MEQ/L
SOURCE: ABNORMAL
SPECIFIC GRAVITY, URINE: 1.02 (ref 1–1.03)
SPECIMEN ACCEPTABILITY: ABNORMAL
STAPH AUREUS BY PCR: DETECTED
STREP AGALACTIAE BY PCR: NOT DETECTED
STREP PNEUMONIAE BY PCR: NOT DETECTED
STREP PYOGENES BY PCR: NOT DETECTED
T4 FREE: 1.11 NG/DL (ref 0.93–1.76)
TOTAL CK: 422 U/L (ref 55–170)
TOTAL PROTEIN: 8 G/DL (ref 6.1–8)
TSH SERPL DL<=0.05 MIU/L-ACNC: 0.72 UIU/ML (ref 0.4–4.2)
UROBILINOGEN, URINE: 1 EU/DL (ref 0–1)
VALPROIC ACID LEVEL: 26.6 UG/ML (ref 50–100)
VITAMIN B-12: 1147 PG/ML (ref 211–911)
WBC # BLD: 5.6 THOU/MM3 (ref 4.8–10.8)

## 2022-10-29 PROCEDURE — 87486 CHLMYD PNEUM DNA AMP PROBE: CPT

## 2022-10-29 PROCEDURE — 2700000000 HC OXYGEN THERAPY PER DAY

## 2022-10-29 PROCEDURE — 87581 M.PNEUMON DNA AMP PROBE: CPT

## 2022-10-29 PROCEDURE — 87641 MR-STAPH DNA AMP PROBE: CPT

## 2022-10-29 PROCEDURE — 70450 CT HEAD/BRAIN W/O DYE: CPT

## 2022-10-29 PROCEDURE — 2580000003 HC RX 258: Performed by: PHYSICIAN ASSISTANT

## 2022-10-29 PROCEDURE — 83930 ASSAY OF BLOOD OSMOLALITY: CPT

## 2022-10-29 PROCEDURE — 99223 1ST HOSP IP/OBS HIGH 75: CPT | Performed by: INTERNAL MEDICINE

## 2022-10-29 PROCEDURE — 72125 CT NECK SPINE W/O DYE: CPT

## 2022-10-29 PROCEDURE — 87798 DETECT AGENT NOS DNA AMP: CPT

## 2022-10-29 PROCEDURE — 89220 SPUTUM SPECIMEN COLLECTION: CPT

## 2022-10-29 PROCEDURE — 6360000002 HC RX W HCPCS: Performed by: INTERNAL MEDICINE

## 2022-10-29 PROCEDURE — 2580000003 HC RX 258: Performed by: INTERNAL MEDICINE

## 2022-10-29 PROCEDURE — 84443 ASSAY THYROID STIM HORMONE: CPT

## 2022-10-29 PROCEDURE — 2500000003 HC RX 250 WO HCPCS: Performed by: STUDENT IN AN ORGANIZED HEALTH CARE EDUCATION/TRAINING PROGRAM

## 2022-10-29 PROCEDURE — 83690 ASSAY OF LIPASE: CPT

## 2022-10-29 PROCEDURE — 73590 X-RAY EXAM OF LOWER LEG: CPT

## 2022-10-29 PROCEDURE — 84300 ASSAY OF URINE SODIUM: CPT

## 2022-10-29 PROCEDURE — 82077 ASSAY SPEC XCP UR&BREATH IA: CPT

## 2022-10-29 PROCEDURE — 2000000000 HC ICU R&B

## 2022-10-29 PROCEDURE — 82140 ASSAY OF AMMONIA: CPT

## 2022-10-29 PROCEDURE — 84439 ASSAY OF FREE THYROXINE: CPT

## 2022-10-29 PROCEDURE — 82330 ASSAY OF CALCIUM: CPT

## 2022-10-29 PROCEDURE — 85025 COMPLETE CBC W/AUTO DIFF WBC: CPT

## 2022-10-29 PROCEDURE — 99223 1ST HOSP IP/OBS HIGH 75: CPT | Performed by: SOCIAL WORKER

## 2022-10-29 PROCEDURE — 82607 VITAMIN B-12: CPT

## 2022-10-29 PROCEDURE — 6360000002 HC RX W HCPCS: Performed by: NURSE PRACTITIONER

## 2022-10-29 PROCEDURE — 85651 RBC SED RATE NONAUTOMATED: CPT

## 2022-10-29 PROCEDURE — 6360000002 HC RX W HCPCS: Performed by: PHYSICIAN ASSISTANT

## 2022-10-29 PROCEDURE — 94761 N-INVAS EAR/PLS OXIMETRY MLT: CPT

## 2022-10-29 PROCEDURE — 85730 THROMBOPLASTIN TIME PARTIAL: CPT

## 2022-10-29 PROCEDURE — 87150 DNA/RNA AMPLIFIED PROBE: CPT

## 2022-10-29 PROCEDURE — 70551 MRI BRAIN STEM W/O DYE: CPT

## 2022-10-29 PROCEDURE — 87147 CULTURE TYPE IMMUNOLOGIC: CPT

## 2022-10-29 PROCEDURE — 87186 SC STD MICRODIL/AGAR DIL: CPT

## 2022-10-29 PROCEDURE — 85610 PROTHROMBIN TIME: CPT

## 2022-10-29 PROCEDURE — 82550 ASSAY OF CK (CPK): CPT

## 2022-10-29 PROCEDURE — 2500000003 HC RX 250 WO HCPCS: Performed by: NURSE PRACTITIONER

## 2022-10-29 PROCEDURE — 82746 ASSAY OF FOLIC ACID SERUM: CPT

## 2022-10-29 PROCEDURE — 83935 ASSAY OF URINE OSMOLALITY: CPT

## 2022-10-29 PROCEDURE — 31500 INSERT EMERGENCY AIRWAY: CPT

## 2022-10-29 PROCEDURE — 2500000003 HC RX 250 WO HCPCS: Performed by: PHYSICIAN ASSISTANT

## 2022-10-29 PROCEDURE — 2580000003 HC RX 258: Performed by: NURSE PRACTITIONER

## 2022-10-29 PROCEDURE — 51798 US URINE CAPACITY MEASURE: CPT

## 2022-10-29 PROCEDURE — 81003 URINALYSIS AUTO W/O SCOPE: CPT

## 2022-10-29 PROCEDURE — 71045 X-RAY EXAM CHEST 1 VIEW: CPT

## 2022-10-29 PROCEDURE — 87205 SMEAR GRAM STAIN: CPT

## 2022-10-29 PROCEDURE — 80053 COMPREHEN METABOLIC PANEL: CPT

## 2022-10-29 PROCEDURE — 87077 CULTURE AEROBIC IDENTIFY: CPT

## 2022-10-29 PROCEDURE — 93005 ELECTROCARDIOGRAM TRACING: CPT | Performed by: NURSE PRACTITIONER

## 2022-10-29 PROCEDURE — 94002 VENT MGMT INPAT INIT DAY: CPT

## 2022-10-29 PROCEDURE — 87541 LEGION PNEUMO DNA AMP PROB: CPT

## 2022-10-29 PROCEDURE — 70486 CT MAXILLOFACIAL W/O DYE: CPT

## 2022-10-29 PROCEDURE — 87500 VANOMYCIN DNA AMP PROBE: CPT

## 2022-10-29 PROCEDURE — 87070 CULTURE OTHR SPECIMN AEROBIC: CPT

## 2022-10-29 PROCEDURE — 93005 ELECTROCARDIOGRAM TRACING: CPT | Performed by: PHYSICIAN ASSISTANT

## 2022-10-29 PROCEDURE — 99285 EMERGENCY DEPT VISIT HI MDM: CPT

## 2022-10-29 PROCEDURE — 80164 ASSAY DIPROPYLACETIC ACD TOT: CPT

## 2022-10-29 PROCEDURE — 86140 C-REACTIVE PROTEIN: CPT

## 2022-10-29 PROCEDURE — 80307 DRUG TEST PRSMV CHEM ANLYZR: CPT

## 2022-10-29 PROCEDURE — 36415 COLL VENOUS BLD VENIPUNCTURE: CPT

## 2022-10-29 PROCEDURE — 83735 ASSAY OF MAGNESIUM: CPT

## 2022-10-29 PROCEDURE — 82800 BLOOD PH: CPT

## 2022-10-29 PROCEDURE — 87631 RESP VIRUS 3-5 TARGETS: CPT

## 2022-10-29 RX ORDER — ASPIRIN 81 MG/1
81 TABLET, CHEWABLE ORAL DAILY
Status: DISCONTINUED | OUTPATIENT
Start: 2022-10-29 | End: 2022-10-29

## 2022-10-29 RX ORDER — ACETAMINOPHEN 325 MG/1
650 TABLET ORAL EVERY 6 HOURS PRN
Status: DISCONTINUED | OUTPATIENT
Start: 2022-10-29 | End: 2022-11-11 | Stop reason: HOSPADM

## 2022-10-29 RX ORDER — POTASSIUM CHLORIDE 7.45 MG/ML
10 INJECTION INTRAVENOUS ONCE
Status: COMPLETED | OUTPATIENT
Start: 2022-10-29 | End: 2022-10-29

## 2022-10-29 RX ORDER — CARVEDILOL 6.25 MG/1
6.25 TABLET ORAL 2 TIMES DAILY
Status: DISCONTINUED | OUTPATIENT
Start: 2022-10-29 | End: 2022-10-29

## 2022-10-29 RX ORDER — CHLORHEXIDINE GLUCONATE 0.12 MG/ML
15 RINSE ORAL 2 TIMES DAILY
Status: DISCONTINUED | OUTPATIENT
Start: 2022-10-29 | End: 2022-10-29

## 2022-10-29 RX ORDER — 0.9 % SODIUM CHLORIDE 0.9 %
500 INTRAVENOUS SOLUTION INTRAVENOUS ONCE
Status: COMPLETED | OUTPATIENT
Start: 2022-10-29 | End: 2022-10-29

## 2022-10-29 RX ORDER — FAMOTIDINE 10 MG/ML
20 INJECTION, SOLUTION INTRAVENOUS DAILY
Status: DISCONTINUED | OUTPATIENT
Start: 2022-10-29 | End: 2022-10-29

## 2022-10-29 RX ORDER — DEXTROSE AND SODIUM CHLORIDE 5; .45 G/100ML; G/100ML
INJECTION, SOLUTION INTRAVENOUS CONTINUOUS
Status: DISCONTINUED | OUTPATIENT
Start: 2022-10-29 | End: 2022-10-29

## 2022-10-29 RX ORDER — NALTREXONE HYDROCHLORIDE 50 MG/1
50 TABLET, FILM COATED ORAL DAILY
COMMUNITY

## 2022-10-29 RX ORDER — AMLODIPINE BESYLATE 10 MG/1
10 TABLET ORAL DAILY
Status: DISCONTINUED | OUTPATIENT
Start: 2022-10-29 | End: 2022-10-29

## 2022-10-29 RX ORDER — SODIUM CHLORIDE 0.9 % (FLUSH) 0.9 %
5-40 SYRINGE (ML) INJECTION PRN
Status: DISCONTINUED | OUTPATIENT
Start: 2022-10-29 | End: 2022-10-29

## 2022-10-29 RX ORDER — DEXMEDETOMIDINE HYDROCHLORIDE 4 UG/ML
.1-1.5 INJECTION, SOLUTION INTRAVENOUS CONTINUOUS
Status: DISCONTINUED | OUTPATIENT
Start: 2022-10-29 | End: 2022-10-30

## 2022-10-29 RX ORDER — SODIUM CHLORIDE 9 MG/ML
INJECTION, SOLUTION INTRAVENOUS PRN
Status: DISCONTINUED | OUTPATIENT
Start: 2022-10-29 | End: 2022-10-29 | Stop reason: SDUPTHER

## 2022-10-29 RX ORDER — NICOTINE 21 MG/24HR
1 PATCH, TRANSDERMAL 24 HOURS TRANSDERMAL EVERY 24 HOURS
COMMUNITY

## 2022-10-29 RX ORDER — MIDAZOLAM HYDROCHLORIDE 1 MG/ML
INJECTION INTRAMUSCULAR; INTRAVENOUS
Status: COMPLETED | OUTPATIENT
Start: 2022-10-29 | End: 2022-10-29

## 2022-10-29 RX ORDER — ONDANSETRON 4 MG/1
4 TABLET, ORALLY DISINTEGRATING ORAL EVERY 8 HOURS PRN
Status: DISCONTINUED | OUTPATIENT
Start: 2022-10-29 | End: 2022-10-29

## 2022-10-29 RX ORDER — FOLIC ACID 1 MG/1
1 TABLET ORAL DAILY
COMMUNITY

## 2022-10-29 RX ORDER — PANTOPRAZOLE SODIUM 40 MG/10ML
40 INJECTION, POWDER, LYOPHILIZED, FOR SOLUTION INTRAVENOUS DAILY
Status: DISCONTINUED | OUTPATIENT
Start: 2022-10-29 | End: 2022-11-02

## 2022-10-29 RX ORDER — PANTOPRAZOLE SODIUM 40 MG/1
40 TABLET, DELAYED RELEASE ORAL
Status: CANCELLED | OUTPATIENT
Start: 2022-10-30

## 2022-10-29 RX ORDER — SODIUM CHLORIDE, SODIUM LACTATE, POTASSIUM CHLORIDE, AND CALCIUM CHLORIDE .6; .31; .03; .02 G/100ML; G/100ML; G/100ML; G/100ML
2000 INJECTION, SOLUTION INTRAVENOUS ONCE
Status: COMPLETED | OUTPATIENT
Start: 2022-10-30 | End: 2022-10-30

## 2022-10-29 RX ORDER — POLYETHYLENE GLYCOL 3350 17 G/17G
17 POWDER, FOR SOLUTION ORAL DAILY PRN
Status: DISCONTINUED | OUTPATIENT
Start: 2022-10-29 | End: 2022-10-29

## 2022-10-29 RX ORDER — ONDANSETRON 2 MG/ML
4 INJECTION INTRAMUSCULAR; INTRAVENOUS EVERY 6 HOURS PRN
Status: DISCONTINUED | OUTPATIENT
Start: 2022-10-29 | End: 2022-10-29

## 2022-10-29 RX ORDER — POLYETHYLENE GLYCOL 3350 17 G/17G
17 POWDER, FOR SOLUTION ORAL DAILY PRN
Status: DISCONTINUED | OUTPATIENT
Start: 2022-10-29 | End: 2022-11-11 | Stop reason: HOSPADM

## 2022-10-29 RX ORDER — MIDAZOLAM HYDROCHLORIDE 1 MG/ML
1 INJECTION INTRAMUSCULAR; INTRAVENOUS ONCE
Status: COMPLETED | OUTPATIENT
Start: 2022-10-29 | End: 2022-10-29

## 2022-10-29 RX ORDER — LORAZEPAM 1 MG/1
1 TABLET ORAL 3 TIMES DAILY
Status: ON HOLD | COMMUNITY
End: 2022-11-11 | Stop reason: SDUPTHER

## 2022-10-29 RX ORDER — DIVALPROEX SODIUM 250 MG/1
250 TABLET, DELAYED RELEASE ORAL 3 TIMES DAILY
Status: ON HOLD | COMMUNITY
End: 2022-11-01

## 2022-10-29 RX ORDER — MIDAZOLAM IN NACL,ISO-OSMOT/PF 50 MG/50ML
1-10 INFUSION BOTTLE (ML) INTRAVENOUS CONTINUOUS
Status: DISCONTINUED | OUTPATIENT
Start: 2022-10-29 | End: 2022-10-30

## 2022-10-29 RX ORDER — PROPRANOLOL HYDROCHLORIDE 20 MG/1
20 TABLET ORAL 2 TIMES DAILY
Status: ON HOLD | COMMUNITY
End: 2022-11-11 | Stop reason: HOSPADM

## 2022-10-29 RX ORDER — LORAZEPAM 2 MG/ML
1 INJECTION INTRAMUSCULAR EVERY 6 HOURS PRN
Status: DISCONTINUED | OUTPATIENT
Start: 2022-10-29 | End: 2022-10-29

## 2022-10-29 RX ORDER — THIAMINE HYDROCHLORIDE 100 MG/ML
100 INJECTION, SOLUTION INTRAMUSCULAR; INTRAVENOUS DAILY
Status: DISCONTINUED | OUTPATIENT
Start: 2022-10-29 | End: 2022-10-29

## 2022-10-29 RX ORDER — SODIUM CHLORIDE 0.9 % (FLUSH) 0.9 %
5-40 SYRINGE (ML) INJECTION EVERY 12 HOURS SCHEDULED
Status: DISCONTINUED | OUTPATIENT
Start: 2022-10-29 | End: 2022-10-29 | Stop reason: SDUPTHER

## 2022-10-29 RX ORDER — HYDRALAZINE HYDROCHLORIDE 50 MG/1
50 TABLET, FILM COATED ORAL EVERY 8 HOURS
Status: DISCONTINUED | OUTPATIENT
Start: 2022-10-29 | End: 2022-10-29

## 2022-10-29 RX ORDER — THIAMINE MONONITRATE (VIT B1) 100 MG
100 TABLET ORAL DAILY
COMMUNITY

## 2022-10-29 RX ORDER — ACETAMINOPHEN 650 MG/1
650 SUPPOSITORY RECTAL EVERY 6 HOURS PRN
Status: DISCONTINUED | OUTPATIENT
Start: 2022-10-29 | End: 2022-11-11 | Stop reason: HOSPADM

## 2022-10-29 RX ORDER — SODIUM CHLORIDE, SODIUM LACTATE, POTASSIUM CHLORIDE, AND CALCIUM CHLORIDE .6; .31; .03; .02 G/100ML; G/100ML; G/100ML; G/100ML
1000 INJECTION, SOLUTION INTRAVENOUS ONCE
Status: DISCONTINUED | OUTPATIENT
Start: 2022-10-30 | End: 2022-10-29

## 2022-10-29 RX ORDER — MINERAL OIL AND WHITE PETROLATUM 150; 830 MG/G; MG/G
OINTMENT OPHTHALMIC EVERY 4 HOURS
Status: DISCONTINUED | OUTPATIENT
Start: 2022-10-29 | End: 2022-10-29

## 2022-10-29 RX ORDER — SODIUM CHLORIDE 0.9 % (FLUSH) 0.9 %
5-40 SYRINGE (ML) INJECTION PRN
Status: DISCONTINUED | OUTPATIENT
Start: 2022-10-29 | End: 2022-11-11 | Stop reason: HOSPADM

## 2022-10-29 RX ORDER — MIRTAZAPINE 15 MG/1
15 TABLET, FILM COATED ORAL NIGHTLY
COMMUNITY

## 2022-10-29 RX ORDER — ETOMIDATE 2 MG/ML
INJECTION INTRAVENOUS
Status: COMPLETED | OUTPATIENT
Start: 2022-10-29 | End: 2022-10-29

## 2022-10-29 RX ORDER — POLYVINYL ALCOHOL 14 MG/ML
1 SOLUTION/ DROPS OPHTHALMIC EVERY 4 HOURS
Status: DISCONTINUED | OUTPATIENT
Start: 2022-10-29 | End: 2022-10-29

## 2022-10-29 RX ORDER — ONDANSETRON 4 MG/1
4 TABLET, ORALLY DISINTEGRATING ORAL EVERY 8 HOURS PRN
Status: DISCONTINUED | OUTPATIENT
Start: 2022-10-29 | End: 2022-11-11 | Stop reason: HOSPADM

## 2022-10-29 RX ORDER — SODIUM CHLORIDE, SODIUM LACTATE, POTASSIUM CHLORIDE, CALCIUM CHLORIDE 600; 310; 30; 20 MG/100ML; MG/100ML; MG/100ML; MG/100ML
INJECTION, SOLUTION INTRAVENOUS CONTINUOUS
Status: DISCONTINUED | OUTPATIENT
Start: 2022-10-29 | End: 2022-10-30

## 2022-10-29 RX ORDER — TOPIRAMATE 50 MG/1
50 TABLET, FILM COATED ORAL 2 TIMES DAILY
Status: ON HOLD | COMMUNITY
End: 2022-11-11 | Stop reason: HOSPADM

## 2022-10-29 RX ORDER — SODIUM CHLORIDE 0.9 % (FLUSH) 0.9 %
5-40 SYRINGE (ML) INJECTION EVERY 12 HOURS SCHEDULED
Status: DISCONTINUED | OUTPATIENT
Start: 2022-10-29 | End: 2022-11-11 | Stop reason: HOSPADM

## 2022-10-29 RX ORDER — ENOXAPARIN SODIUM 100 MG/ML
40 INJECTION SUBCUTANEOUS DAILY
Status: DISCONTINUED | OUTPATIENT
Start: 2022-10-29 | End: 2022-11-11 | Stop reason: HOSPADM

## 2022-10-29 RX ORDER — M-VIT,TX,IRON,MINS/CALC/FOLIC 27MG-0.4MG
1 TABLET ORAL DAILY
Status: ON HOLD | COMMUNITY
End: 2022-11-11 | Stop reason: HOSPADM

## 2022-10-29 RX ORDER — ONDANSETRON 2 MG/ML
4 INJECTION INTRAMUSCULAR; INTRAVENOUS EVERY 6 HOURS PRN
Status: DISCONTINUED | OUTPATIENT
Start: 2022-10-29 | End: 2022-11-11 | Stop reason: HOSPADM

## 2022-10-29 RX ORDER — SODIUM CHLORIDE 9 MG/ML
INJECTION, SOLUTION INTRAVENOUS PRN
Status: DISCONTINUED | OUTPATIENT
Start: 2022-10-29 | End: 2022-11-11 | Stop reason: HOSPADM

## 2022-10-29 RX ORDER — THIAMINE HYDROCHLORIDE 100 MG/ML
500 INJECTION, SOLUTION INTRAMUSCULAR; INTRAVENOUS 3 TIMES DAILY
Status: DISCONTINUED | OUTPATIENT
Start: 2022-10-29 | End: 2022-10-29 | Stop reason: SDUPTHER

## 2022-10-29 RX ORDER — DEXTROSE AND SODIUM CHLORIDE 5; .9 G/100ML; G/100ML
INJECTION, SOLUTION INTRAVENOUS CONTINUOUS
Status: DISCONTINUED | OUTPATIENT
Start: 2022-10-29 | End: 2022-10-29

## 2022-10-29 RX ADMIN — SODIUM CHLORIDE, PRESERVATIVE FREE 10 ML: 5 INJECTION INTRAVENOUS at 23:17

## 2022-10-29 RX ADMIN — THIAMINE HYDROCHLORIDE 500 MG: 100 INJECTION, SOLUTION INTRAMUSCULAR; INTRAVENOUS at 12:02

## 2022-10-29 RX ADMIN — THIAMINE HYDROCHLORIDE 500 MG: 100 INJECTION, SOLUTION INTRAMUSCULAR; INTRAVENOUS at 21:11

## 2022-10-29 RX ADMIN — Medication 0.2 MCG/KG/HR: at 16:44

## 2022-10-29 RX ADMIN — Medication 2 MG/HR: at 20:06

## 2022-10-29 RX ADMIN — LORAZEPAM 1 MG: 2 INJECTION INTRAMUSCULAR; INTRAVENOUS at 11:59

## 2022-10-29 RX ADMIN — ETOMIDATE 20 MG: 2 INJECTION INTRAVENOUS at 19:45

## 2022-10-29 RX ADMIN — SODIUM CHLORIDE, POTASSIUM CHLORIDE, SODIUM LACTATE AND CALCIUM CHLORIDE: 600; 310; 30; 20 INJECTION, SOLUTION INTRAVENOUS at 13:30

## 2022-10-29 RX ADMIN — MIDAZOLAM 1 MG: 1 INJECTION INTRAMUSCULAR; INTRAVENOUS at 18:30

## 2022-10-29 RX ADMIN — FOLIC ACID 1 MG: 5 INJECTION, SOLUTION INTRAMUSCULAR; INTRAVENOUS; SUBCUTANEOUS at 11:59

## 2022-10-29 RX ADMIN — MIDAZOLAM 4 MG: 1 INJECTION INTRAMUSCULAR; INTRAVENOUS at 19:44

## 2022-10-29 RX ADMIN — SODIUM CHLORIDE 500 ML: 9 INJECTION, SOLUTION INTRAVENOUS at 10:02

## 2022-10-29 RX ADMIN — MIDAZOLAM 1 MG: 1 INJECTION INTRAMUSCULAR; INTRAVENOUS at 18:15

## 2022-10-29 RX ADMIN — POTASSIUM CHLORIDE 10 MEQ: 7.46 INJECTION, SOLUTION INTRAVENOUS at 12:02

## 2022-10-29 ASSESSMENT — PULMONARY FUNCTION TESTS: PIF_VALUE: 20

## 2022-10-29 NOTE — CONSULTS
CRITICAL CARE CONSULT NOTE      Patient:  Fabricio Moon    Unit/Bed:4D-17/017-A  YOB: 1955  MRN: [id number]   PCP: Fantasma Corona  Date of Admission: 10/29/2022  Chief Complaint:- confusion    Assessment and Plan:    Elective intubation : 10/29/2022 patient was electively intubated with family consented patient is in need of sedation for MRI of head and LP. Patient was orally intubated connected to PCV mode of ventilation. Continue with lung protection strategies targeting peak pressure 35 and less  and plateau of 30 and less. Concerns of aspiration: During intubation noted large amounts of thick tan-yellow secretions in oropharynx, poor cough and gag prior to intubation. Respiratory culture is pending. Encephalopathy: global cognitive dysfunction. Family identified concerns of Warnicke Korsakoff syndrome. Family believes the patient has behaved similarly in the past due to issues with his alcohol intake. Positive for upward nystagmus. CTH-no acute hemorrhage. EEG is pending. 10/29/2022 MRI Head was attempted positive for movement even with Versed and Precedex on board. Tentative plans for LP 10/30/2022. B12/TSH/Ammonia levels noncontributing. Thiamine will be continued. Hypernatremia: chronic, free water deficit. Calculated free water deficit 3.9L. Bolus with 2L of LR, repeat BMP and change IV fluids to D5W. Target of change of no more than 10 in 24  hours  Acute kidney injury:  Baseline Crea (0.6-1.0). 10/29/2022 urine trace ketones negative blood negative protein. Likely prerenal secondary to dehydration. IV fluids will be continued. Dose medications to GFR no nonsteroidals are to be given. Non anion gap acidosis:  repeat BMP, ph 7.36. Repeat BMP and monitor.   Anemia macrocytic: Monitor and trend  Thrombocytopenic: History, monitor  CVA: History, residual left-sided weakness  GI bleed: History, per family in the past 2 weeks esophagogastroduodenoscopy and colonoscopy were completed under the care of Dr. Matt Gu. Family identifies history of esophageal varices status post banding. INITIAL H AND P AND ICU COURSE:  Armando Ramirez is a 57-year-old  male admitted to [facility] ICU on 10/29/2022 with chief complaint of confusion. Patient has a past medical history significant for current everyday smoker, essential hypertension, 1/2022 Lexiscan stress test minimal inferior defect from base to apex questionable artifact versus prior infarct, GI-Bleed, Esophageal Varices-banding, BERKOWITZ, SBO, diverticular disease status post sigmoid colon resection-2003, TBI- right subdural hematoma, s/p craniotomy and hematoma evacuation, depression, arthritis, degenerative disc disease, alcoholism (last drink reported 9/30/2022). Recent admission at [facility]-patient had requested his Haldol to be stopped. Patient was admitted to [facility] convalescent home post psychiatric stay. Patient was admitted at [facility] 1 month ago for GI bleed where he had EGD and Colonoscopy. Family reports that when waking from anesthesia for the endoscopies, he had altered mentation and this has persisted since. They were told they believed he had a CVA during the procedure. He was discharged to an [facility] for therapy/rehab due to some residual left-sided weakness that were sequelae from skull fracture and subarachnoid hemorrhage 1 year ago. They report 5 or 6 falls since he went to the ECF, the most recent of which occurred the morning of admission. He has been ambulating independently without use of any assistive devices but was too weak to assist himself and getting up today, prompting his ED arrival.  Fall occurred early this morning and was unwitnessed. Family does note the patient has been on Haldol to help with some agitation and they are not a fan and did not feel the medication was working so they requested it be discontinued.   They do note the patient had recent psychiatric admission, was discharged 2 days ago. They report intermittent epistaxis recently, not present after the fall this morning. Denies any recent infectious symptoms. They report that the patient's sister has concern of Warnicke Korsakoff syndrome. Family believes the patient has behaved similarly in the past due to issues with his alcohol intake though any specific diagnosis associated with past behavior is unclear. Family went to visit patient on 10/28, noticed that he was very thirsty, drink 2 quarts of soda/juice. Per LTACH, patient had crawled out of the bed and was found on the floor, became agitated when staff tried to have patient get off the floor. Patient does not respond to questions appropriately or consistently, will not open eyes when asked. CTH negative for acute hemorrhage. 10/29/2022 Neurology did evaluate. Patient was transferred to the ICU for further management and care. Past Medical History:  see HPI. Family History: Mother -CAD, hypertension, father alive. Social History: Current everyday smoker, positive for EtOH use denies any illicit drug use. Patient is currently residing at [address]   GENERAL: Positive for fatigue and weakness  SKN: No lesions or rashes. Dry  HEAD: No headaches or recent injury  EYES: No drainage  EARS: No drainage  NOSE/THROAT: No rhinorrhea or pharyngitis, no nasal drainage  NECK: No lumps or unusual neck stiffness  PULMONARY: No dyspnea, orthopnea or paroxysmal nocturnal dyspnea, stridor, wheezing or cough.  Positive for thick tan sputum  CARDIAC: No chest pain, pressure, heaviness or tightness  GI: No history melena or hematochezia, no diarrhea or constipation  PERIPHERAL VASCULAR: No intermittent claudication or unusual leg cramps  MUSCULOSKELETAL: Occasional arthralgias, myalgias  NEUROLOGICAL: No Seizures or Syncope  HEMATOLOGIC:  No anemia, unusual bruising or bleeding  PSYCH: No homicidal or suicidal ideations    Scheduled Meds:   sodium chloride flush  5-40 mL IntraVENous 2 times per day    pantoprazole  40 mg IntraVENous Daily    [START ON 38/33/8731] folic acid IVPB  1 mg IntraVENous Daily    thiamine (VITAMIN B1) IVPB  500 mg IntraVENous TID    midazolam  1 mg IntraVENous Once     Continuous Infusions:   lactated ringers 100 mL/hr at 10/29/22 1330    sodium chloride      dexmedetomidine 0.3 mcg/kg/hr (10/29/22 1809)       PHYSICAL EXAMINATION:  T:  97.4. P:  72. RR:  22. B/P:  148/77. FiO2:  45. O2 Sat:  100. I/O:  pending  Body mass index is 26.47 kg/m². GCS:   3  General:   pale, warm and dry  HEENT:  normocephalic and atraumatic. No scleral icterus. PERR  Neck: supple. No Thyromegaly. Lungs: clear to auscultation. No retractions  Cardiac: RRR. No JVD. Abdomen: soft. Nontender. Extremities:  No clubbing, cyanosis, or edema x 4. Vasculature: capillary refill < 3 seconds. Palpable dorsalis pedis pulses. Skin:  warm and dry. Psych:  Alert and oriented x3. Affect appropriate  Lymph:  No supraclavicular adenopathy. Neurologic:  No focal deficit. No seizures. Data: (All radiographs, tracings, PFTs, and imaging are personally viewed and interpreted unless otherwise noted). 10/29/2022 0852 Sodium 143 potassium 3.3 chlorides 121 CO2 19  BUN 38 creatinine 1.7  Magnesium is 2.4  Glucose 103  Total protein is 8.0  B12 is 1147  Ammonia is 51  CK4 7 7  CRP is less than 0.30  Albumin 3.3 alk phos is 166 ALT is 34 AST 56 total bili 0.5  TSH 0.724 Free T4 1.11  Alcohol is less than 0.01  Urine toxicology is negative  Valproic acid 26.6  White count 5.6 hemoglobin 10.3 hematocrit 34.6 platelet count 813  Urine trace ketones negative for leukocytes  Urine osmolarity 710 urine sodium 37  10/29/2022 CTH no mass-effect or acute hemorrhage  10/29/2022 CT facial bones no facial bone fracture. 10/29/2022 CT cervical spine no fracture or spondylolithiasis of the cervical spine.   Multilevel degenerative disc disease neural foraminal narrowing and mild to moderate central canal stenosis at level C3-C4, C4-C5, C5-C6 and C6-C7. Chest x-ray no acute intrathoracic process. Left tib-fib x-ray no fracture or dislocation  MRI head is pending  EKG NSR HR 89  QTc 496      Seen with multidisciplinary ICU team yes. Meets Continued ICU Level Care Criteria:    [x] Yes   [] No - Transfer Planned to listed location:  [] HOSPITALIST CONTACTED-      Case and plan discussed with Dr. Dewey Lara    Electronically signed by MARLA Campbell CNP  CRITICAL CARE SPECIALIST     Addendum by Dr. Augustin Araiza MD:  Patient seen by me independently including key components of medical care. Face to face evaluation and examination was performed. Case discussed with MsMARLA Felix CNP. Italicized font, if present,  represents changes to the note made by me. More than 50% of the encounter time involved with patient care by the Pulmonary & Critical care service team spent by me. Please see my modifications mentioned below:  MRI of brain with out contrast performed on 28 October 2022:  1. No acute findings. 2. Areas of volume loss and abnormal signal in the inferior right frontal lobe and the right parietal lobe. Both these areas have associated prior hemorrhage. These could be posttraumatic injuries versus sites of old infarct. These do not appear acute. 3. Mild severity chronic small vessel ischemic changes. Protonix 40 mg IV daily  Lovenox 40 mg subcu daily-on hold for planned lumbar puncture.      Electronically signed by   Radha Richardson MD on 10/30/2022 at 2:11 PM
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
## 2022-10-29 NOTE — ED TRIAGE NOTES
Patient has been altered since having an endoscopy about one month ago. Per family, patient had an unwitnessed fall today. They state that is why he was brought to the ED. Patient's family also states he is not normally combative, but they did recently request his Haldol be stopped.

## 2022-10-29 NOTE — Clinical Note
Discharge Plan[de-identified] Other/[facility])   Telemetry/Cardiac Monitoring Required?: Yes

## 2022-10-29 NOTE — PROGRESS NOTES
[facility] THERAPY MISSED TREATMENT NOTE  [facility]      Date: 10/29/2022  Patient Name: Hayde Whitlock        MRN: [id number]    : 1955  (79 y.o.)    REASON FOR MISSED TREATMENT:  ST received novel orders for CSE. Upon entrance into room, patient sleeping soundly with sister present. RNEriberto reports Ativan given at 1100. Approval for ST attempt. Unable to awaken patient despite verbal cues, tactile cues (I.e. sternal rub) and repositioning to high fowlers. Will check back on Monday, 10/31.      ZEUS Priest 10/29/2022

## 2022-10-29 NOTE — FLOWSHEET NOTE
10/29/22 1427   Safe Environment   Safety Measures Other (comment)  (vn completed admission required documents with sister Tj Erickson , pt resides at [facility] , will request for med list to be faxed to [facility] for completetion.)
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
## 2022-10-29 NOTE — ED NOTES
Pt medicated per MAR. Pt tolerated well. Respirations unlabored. Admitting provider at bedside.       Faustino BURTON RN  10/29/22 1211

## 2022-10-29 NOTE — PROGRESS NOTES
Sanjuana Nurse called [facility] where patient came from to request medication list. Estela Edgar faxed medication list to sanjuana nurse, but stated the patient verbally and behaviorally refused all meds for the less than 24 hour time period he was there, so she does not know the last time he received these medications. Patient was admitted to [facility] after a stay at [facility] 10/28/2022. Sanjuana nurse attempted to call [place] to clarify meds and last dose given. No answer at [place]. Sanjuana nurse added all medications to Home med list review that were on the med fax from [facility].

## 2022-10-29 NOTE — ED NOTES
Per family, patient did recently have a psychiatric admission. They states he was just released a couple of days ago.      Roni Yarbrough RN  10/29/22 [id number]

## 2022-10-29 NOTE — ED NOTES
Pt agitated at this time and to be medicated per PRN order.       Ju GILBERTQJANICE, RN  10/29/22 7928 No.../not for 24 hours

## 2022-10-29 NOTE — ED NOTES
ED to inpatient nurses report    Chief Complaint   Patient presents with    Altered Mental Status      Present to ED from nursing home  LOC: alert to only name  Vital signs   Vitals:    10/29/22 0756 10/29/22 1002 10/29/22 1115 10/29/22 1147   BP: (!) 156/79 (!) 148/96  (!) 150/87   Pulse: 89 92  87   Resp: 20 15  18   Temp: 97.8 °F (36.6 °C)      TempSrc: Oral      SpO2: 98% 98%  97%   Weight:   189 lb 12.8 oz (86.1 kg)       Oxygen Baseline room air    Current needs required room air Bipap/Cpap No  LDAs:   Peripheral IV 10/29/22 Left Arm (Active)   Site Assessment Clean, dry & intact 10/29/22 0821       Peripheral IV 10/29/22 Left Forearm (Active)     Mobility: Requires assistance * 2  Pending ED orders: none  Present condition: pt on cot with family at bedside an unlabored respirations.      C-SSRS    Swallow Screening    Preferred Language: English     Electronically signed by Lucila Arauz RN on 10/29/2022 at 12:54 PM       [facility], RN  10/29/22 6152

## 2022-10-29 NOTE — PROGRESS NOTES
A size 7.5 endotracheal tube was successfully placed using a size S3 blade.  The Lot number for he endotracheal tube was [id number]

## 2022-10-29 NOTE — ED NOTES
Pt transported to [facility]. Floor called prior to transport, spoke with Reese Wells.      Lawson Aldridge  10/29/22 8800

## 2022-10-29 NOTE — ED NOTES
Pt medicated per MAR. Pt tolerated well. Pt sitting up in bed with family at bedside. Updated on OC. Pt given urinal for sample at this time.       Stas FUNEZ RN  10/29/22 1525

## 2022-10-30 ENCOUNTER — APPOINTMENT (OUTPATIENT)
Dept: INTERVENTIONAL RADIOLOGY/VASCULAR | Age: 67
DRG: 640 | End: 2022-10-30
Payer: MEDICARE

## 2022-10-30 PROBLEM — J15.5 PNEUMONIA OF RIGHT LOWER LOBE DUE TO ESCHERICHIA COLI (HCC): Status: ACTIVE | Noted: 2022-10-30

## 2022-10-30 PROBLEM — J96.01 ACUTE RESPIRATORY FAILURE WITH HYPOXIA (HCC): Status: ACTIVE | Noted: 2022-10-30

## 2022-10-30 LAB
ALBUMIN SERPL-MCNC: 2.9 G/DL (ref 3.5–5.1)
ALP BLD-CCNC: 147 U/L (ref 38–126)
ALT SERPL-CCNC: 33 U/L (ref 11–66)
ANION GAP SERPL CALCULATED.3IONS-SCNC: 11 MEQ/L (ref 8–16)
ANION GAP SERPL CALCULATED.3IONS-SCNC: 12 MEQ/L (ref 8–16)
ANION GAP SERPL CALCULATED.3IONS-SCNC: 12 MEQ/L (ref 8–16)
ANION GAP SERPL CALCULATED.3IONS-SCNC: 14 MEQ/L (ref 8–16)
AST SERPL-CCNC: 64 U/L (ref 5–40)
BASOPHILS # BLD: 0.1 %
BASOPHILS ABSOLUTE: 0 THOU/MM3 (ref 0–0.1)
BILIRUB SERPL-MCNC: 0.4 MG/DL (ref 0.3–1.2)
BUN BLDV-MCNC: 25 MG/DL (ref 7–22)
BUN BLDV-MCNC: 26 MG/DL (ref 7–22)
BUN BLDV-MCNC: 26 MG/DL (ref 7–22)
BUN BLDV-MCNC: 29 MG/DL (ref 7–22)
BUN BLDV-MCNC: 30 MG/DL (ref 7–22)
BUN BLDV-MCNC: 32 MG/DL (ref 7–22)
CALCIUM SERPL-MCNC: 8.1 MG/DL (ref 8.5–10.5)
CALCIUM SERPL-MCNC: 8.3 MG/DL (ref 8.5–10.5)
CALCIUM SERPL-MCNC: 8.7 MG/DL (ref 8.5–10.5)
CALCIUM SERPL-MCNC: 8.8 MG/DL (ref 8.5–10.5)
CALCIUM SERPL-MCNC: 8.8 MG/DL (ref 8.5–10.5)
CALCIUM SERPL-MCNC: 9.2 MG/DL (ref 8.5–10.5)
CHLORIDE BLD-SCNC: 117 MEQ/L (ref 98–111)
CHLORIDE BLD-SCNC: 118 MEQ/L (ref 98–111)
CHLORIDE BLD-SCNC: 119 MEQ/L (ref 98–111)
CHLORIDE BLD-SCNC: 125 MEQ/L (ref 98–111)
CHLORIDE BLD-SCNC: 125 MEQ/L (ref 98–111)
CHLORIDE BLD-SCNC: 128 MEQ/L (ref 98–111)
CO2: 16 MEQ/L (ref 23–33)
CO2: 17 MEQ/L (ref 23–33)
CO2: 17 MEQ/L (ref 23–33)
CO2: 19 MEQ/L (ref 23–33)
CO2: 19 MEQ/L (ref 23–33)
CO2: 21 MEQ/L (ref 23–33)
CREAT SERPL-MCNC: 1.3 MG/DL (ref 0.4–1.2)
CREAT SERPL-MCNC: 1.4 MG/DL (ref 0.4–1.2)
CREAT SERPL-MCNC: 1.4 MG/DL (ref 0.4–1.2)
CRYPTOCOCCUS NEOFORMANS/GATTI CSF FILM ARR.: NOT DETECTED
CYTOMEGALOVIRUS (CMV) CSF FILM ARRAY: NOT DETECTED
EKG ATRIAL RATE: 54 BPM
EKG ATRIAL RATE: 89 BPM
EKG P AXIS: 60 DEGREES
EKG P AXIS: 73 DEGREES
EKG P-R INTERVAL: 162 MS
EKG P-R INTERVAL: 164 MS
EKG Q-T INTERVAL: 408 MS
EKG Q-T INTERVAL: 488 MS
EKG QRS DURATION: 96 MS
EKG QRS DURATION: 98 MS
EKG QTC CALCULATION (BAZETT): 462 MS
EKG QTC CALCULATION (BAZETT): 496 MS
EKG R AXIS: 49 DEGREES
EKG R AXIS: 65 DEGREES
EKG T AXIS: 10 DEGREES
EKG T AXIS: 68 DEGREES
EKG VENTRICULAR RATE: 54 BPM
EKG VENTRICULAR RATE: 89 BPM
ENTEROVIRUS DETECTION PCR: NOT DETECTED
EOSINOPHIL # BLD: 0.7 %
EOSINOPHILS ABSOLUTE: 0 THOU/MM3 (ref 0–0.4)
ERYTHROCYTE [DISTWIDTH] IN BLOOD BY AUTOMATED COUNT: 15.8 % (ref 11.5–14.5)
ERYTHROCYTE [DISTWIDTH] IN BLOOD BY AUTOMATED COUNT: 54.4 FL (ref 35–45)
ESCHERICHIA COLI K1 CSF FILM ARRAY: NOT DETECTED
GFR SERPL CREATININE-BSD FRML MDRD: 55 ML/MIN/1.73M2
GFR SERPL CREATININE-BSD FRML MDRD: 55 ML/MIN/1.73M2
GFR SERPL CREATININE-BSD FRML MDRD: 60 ML/MIN/1.73M2
GLUCOSE BLD-MCNC: 107 MG/DL (ref 70–108)
GLUCOSE BLD-MCNC: 107 MG/DL (ref 70–108)
GLUCOSE BLD-MCNC: 110 MG/DL (ref 70–108)
GLUCOSE BLD-MCNC: 117 MG/DL (ref 70–108)
GLUCOSE BLD-MCNC: 166 MG/DL (ref 70–108)
GLUCOSE BLD-MCNC: 92 MG/DL (ref 70–108)
GLUCOSE, CSF: 93 MG/DL (ref 40–80)
HAEMOPHILUS INFLUENZA CSF FILM ARRAY: NOT DETECTED
HCT VFR BLD CALC: 36.2 % (ref 42–52)
HEMOGLOBIN: 10.4 GM/DL (ref 14–18)
HHV-6 (HERPESVIRUS 6) CSF FILM ARRAY: NOT DETECTED
HSV-1 CSF FILM ARRAY: NOT DETECTED
HSV-2 CSF FILM ARRAY: NOT DETECTED
HYPOCHROMIA: PRESENT
IMMATURE GRANS (ABS): 0.02 THOU/MM3 (ref 0–0.07)
IMMATURE GRANULOCYTES: 0.3 %
INR BLD: 1.33 (ref 0.85–1.13)
LISTERIA MONOCYTOGENES CSF FILM ARRAY: NOT DETECTED
LYMPHOCYTES # BLD: 7.2 %
LYMPHOCYTES ABSOLUTE: 0.5 THOU/MM3 (ref 1–4.8)
MCH RBC QN AUTO: 27.4 PG (ref 26–33)
MCHC RBC AUTO-ENTMCNC: 28.7 GM/DL (ref 32.2–35.5)
MCV RBC AUTO: 95.5 FL (ref 80–94)
MONOCYTES # BLD: 6.9 %
MONOCYTES ABSOLUTE: 0.5 THOU/MM3 (ref 0.4–1.3)
MRSA SCREEN RT-PCR: NEGATIVE
NEISSERIA MENIGITIDIS CSF FILM ARRAY: NOT DETECTED
NUCLEATED RED BLOOD CELLS: 0 /100 WBC
PARECHOVIRUS CSF FILM ARRAY: NOT DETECTED
PH VENOUS: 7.3 (ref 7.31–7.41)
PLATELET # BLD: 75 THOU/MM3 (ref 130–400)
PMV BLD AUTO: 11.1 FL (ref 9.4–12.4)
POTASSIUM SERPL-SCNC: 3.1 MEQ/L (ref 3.5–5.2)
POTASSIUM SERPL-SCNC: 3.5 MEQ/L (ref 3.5–5.2)
POTASSIUM SERPL-SCNC: 3.6 MEQ/L (ref 3.5–5.2)
POTASSIUM SERPL-SCNC: 3.6 MEQ/L (ref 3.5–5.2)
POTASSIUM SERPL-SCNC: 4.1 MEQ/L (ref 3.5–5.2)
POTASSIUM SERPL-SCNC: 4.8 MEQ/L (ref 3.5–5.2)
PROCALCITONIN: 0.15 NG/ML (ref 0.01–0.09)
PROTEIN CSF: 83 MG/DL (ref 12–60)
RBC # BLD: 3.79 MILL/MM3 (ref 4.7–6.1)
SCAN OF BLOOD SMEAR: NORMAL
SEG NEUTROPHILS: 84.8 %
SEGMENTED NEUTROPHILS ABSOLUTE COUNT: 5.8 THOU/MM3 (ref 1.8–7.7)
SODIUM BLD-SCNC: 148 MEQ/L (ref 135–145)
SODIUM BLD-SCNC: 148 MEQ/L (ref 135–145)
SODIUM BLD-SCNC: 149 MEQ/L (ref 135–145)
SODIUM BLD-SCNC: 155 MEQ/L (ref 135–145)
SODIUM BLD-SCNC: 155 MEQ/L (ref 135–145)
SODIUM BLD-SCNC: 157 MEQ/L (ref 135–145)
STREPTOCOCCUS AGALACTIAE CSF FILM ARRAY: NOT DETECTED
STREPTOCOCCUS PNEUMONIAE CSF FILM ARRAY: NOT DETECTED
TOTAL PROTEIN: 6.6 G/DL (ref 6.1–8)
VANCOMYCIN RESISTANT ENTEROCOCCUS: NEGATIVE
VARICELLA-ZOSTER, PCR: NOT DETECTED
WBC # BLD: 6.8 THOU/MM3 (ref 4.8–10.8)

## 2022-10-30 PROCEDURE — 2709999900 IR LUMBAR PUNCTURE FOR DIAGNOSIS

## 2022-10-30 PROCEDURE — 86618 LYME DISEASE ANTIBODY: CPT

## 2022-10-30 PROCEDURE — 82800 BLOOD PH: CPT

## 2022-10-30 PROCEDURE — 85610 PROTHROMBIN TIME: CPT

## 2022-10-30 PROCEDURE — 2580000003 HC RX 258: Performed by: INTERNAL MEDICINE

## 2022-10-30 PROCEDURE — 95705 EEG W/O VID 2-12 HR UNMNTR: CPT

## 2022-10-30 PROCEDURE — 6360000002 HC RX W HCPCS: Performed by: INTERNAL MEDICINE

## 2022-10-30 PROCEDURE — 93010 ELECTROCARDIOGRAM REPORT: CPT | Performed by: INTERNAL MEDICINE

## 2022-10-30 PROCEDURE — 84145 PROCALCITONIN (PCT): CPT

## 2022-10-30 PROCEDURE — 95717 EEG PHYS/QHP 2-12 HR W/O VID: CPT | Performed by: PSYCHIATRY & NEUROLOGY

## 2022-10-30 PROCEDURE — 6360000002 HC RX W HCPCS

## 2022-10-30 PROCEDURE — 2580000003 HC RX 258

## 2022-10-30 PROCEDURE — 2700000000 HC OXYGEN THERAPY PER DAY

## 2022-10-30 PROCEDURE — 94761 N-INVAS EAR/PLS OXIMETRY MLT: CPT

## 2022-10-30 PROCEDURE — 88108 CYTOPATH CONCENTRATE TECH: CPT

## 2022-10-30 PROCEDURE — 2060000000 HC ICU INTERMEDIATE R&B

## 2022-10-30 PROCEDURE — 2500000003 HC RX 250 WO HCPCS: Performed by: STUDENT IN AN ORGANIZED HEALTH CARE EDUCATION/TRAINING PROGRAM

## 2022-10-30 PROCEDURE — 2720000010 HC SURG SUPPLY STERILE

## 2022-10-30 PROCEDURE — 87075 CULTR BACTERIA EXCEPT BLOOD: CPT

## 2022-10-30 PROCEDURE — 2580000003 HC RX 258: Performed by: NURSE PRACTITIONER

## 2022-10-30 PROCEDURE — C9113 INJ PANTOPRAZOLE SODIUM, VIA: HCPCS

## 2022-10-30 PROCEDURE — 84157 ASSAY OF PROTEIN OTHER: CPT

## 2022-10-30 PROCEDURE — 51798 US URINE CAPACITY MEASURE: CPT

## 2022-10-30 PROCEDURE — 2500000003 HC RX 250 WO HCPCS

## 2022-10-30 PROCEDURE — 6360000002 HC RX W HCPCS: Performed by: NURSE PRACTITIONER

## 2022-10-30 PROCEDURE — 89051 BODY FLUID CELL COUNT: CPT

## 2022-10-30 PROCEDURE — 85025 COMPLETE CBC W/AUTO DIFF WBC: CPT

## 2022-10-30 PROCEDURE — 99291 CRITICAL CARE FIRST HOUR: CPT | Performed by: INTERNAL MEDICINE

## 2022-10-30 PROCEDURE — 87798 DETECT AGENT NOS DNA AMP: CPT

## 2022-10-30 PROCEDURE — 36415 COLL VENOUS BLD VENIPUNCTURE: CPT

## 2022-10-30 PROCEDURE — 94003 VENT MGMT INPAT SUBQ DAY: CPT

## 2022-10-30 PROCEDURE — 88112 CYTOPATH CELL ENHANCE TECH: CPT

## 2022-10-30 PROCEDURE — 51701 INSERT BLADDER CATHETER: CPT

## 2022-10-30 PROCEDURE — 009U3ZX DRAINAGE OF SPINAL CANAL, PERCUTANEOUS APPROACH, DIAGNOSTIC: ICD-10-PCS | Performed by: RADIOLOGY

## 2022-10-30 PROCEDURE — 86592 SYPHILIS TEST NON-TREP QUAL: CPT

## 2022-10-30 PROCEDURE — 80053 COMPREHEN METABOLIC PANEL: CPT

## 2022-10-30 PROCEDURE — 82945 GLUCOSE OTHER FLUID: CPT

## 2022-10-30 PROCEDURE — 62328 DX LMBR SPI PNXR W/FLUOR/CT: CPT

## 2022-10-30 RX ORDER — SODIUM CHLORIDE, SODIUM LACTATE, POTASSIUM CHLORIDE, AND CALCIUM CHLORIDE .6; .31; .03; .02 G/100ML; G/100ML; G/100ML; G/100ML
1000 INJECTION, SOLUTION INTRAVENOUS ONCE
Status: COMPLETED | OUTPATIENT
Start: 2022-10-31 | End: 2022-10-31

## 2022-10-30 RX ORDER — DEXTROSE MONOHYDRATE 50 MG/ML
INJECTION, SOLUTION INTRAVENOUS CONTINUOUS
Status: DISCONTINUED | OUTPATIENT
Start: 2022-10-30 | End: 2022-10-30

## 2022-10-30 RX ORDER — SODIUM CHLORIDE 450 MG/100ML
INJECTION, SOLUTION INTRAVENOUS CONTINUOUS
Status: DISCONTINUED | OUTPATIENT
Start: 2022-10-30 | End: 2022-11-01

## 2022-10-30 RX ADMIN — FOLIC ACID 1 MG: 5 INJECTION, SOLUTION INTRAMUSCULAR; INTRAVENOUS; SUBCUTANEOUS at 08:38

## 2022-10-30 RX ADMIN — DEXTROSE MONOHYDRATE: 50 INJECTION, SOLUTION INTRAVENOUS at 08:31

## 2022-10-30 RX ADMIN — Medication 0.6 MCG/KG/HR: at 00:18

## 2022-10-30 RX ADMIN — CEFTRIAXONE 2000 MG: 2 INJECTION, POWDER, FOR SOLUTION INTRAMUSCULAR; INTRAVENOUS at 02:00

## 2022-10-30 RX ADMIN — SODIUM CHLORIDE, PRESERVATIVE FREE 10 ML: 5 INJECTION INTRAVENOUS at 08:39

## 2022-10-30 RX ADMIN — THIAMINE HYDROCHLORIDE 500 MG: 100 INJECTION, SOLUTION INTRAMUSCULAR; INTRAVENOUS at 20:04

## 2022-10-30 RX ADMIN — SODIUM CHLORIDE, PRESERVATIVE FREE 10 ML: 5 INJECTION INTRAVENOUS at 20:08

## 2022-10-30 RX ADMIN — SODIUM CHLORIDE, POTASSIUM CHLORIDE, SODIUM LACTATE AND CALCIUM CHLORIDE 2000 ML: 600; 310; 30; 20 INJECTION, SOLUTION INTRAVENOUS at 00:16

## 2022-10-30 RX ADMIN — SODIUM CHLORIDE: 4.5 INJECTION, SOLUTION INTRAVENOUS at 21:36

## 2022-10-30 RX ADMIN — PANTOPRAZOLE SODIUM 40 MG: 40 INJECTION, POWDER, FOR SOLUTION INTRAVENOUS at 06:45

## 2022-10-30 RX ADMIN — THIAMINE HYDROCHLORIDE 500 MG: 100 INJECTION, SOLUTION INTRAMUSCULAR; INTRAVENOUS at 09:12

## 2022-10-30 RX ADMIN — SODIUM CHLORIDE, POTASSIUM CHLORIDE, SODIUM LACTATE AND CALCIUM CHLORIDE 1000 ML: 600; 310; 30; 20 INJECTION, SOLUTION INTRAVENOUS at 23:32

## 2022-10-30 RX ADMIN — DEXTROSE MONOHYDRATE: 50 INJECTION, SOLUTION INTRAVENOUS at 12:35

## 2022-10-30 RX ADMIN — Medication 0.5 MCG/KG/HR: at 08:47

## 2022-10-30 RX ADMIN — SODIUM CHLORIDE, POTASSIUM CHLORIDE, SODIUM LACTATE AND CALCIUM CHLORIDE: 600; 310; 30; 20 INJECTION, SOLUTION INTRAVENOUS at 02:25

## 2022-10-30 RX ADMIN — THIAMINE HYDROCHLORIDE 500 MG: 100 INJECTION, SOLUTION INTRAMUSCULAR; INTRAVENOUS at 14:29

## 2022-10-30 RX ADMIN — DEXTROSE MONOHYDRATE: 50 INJECTION, SOLUTION INTRAVENOUS at 04:27

## 2022-10-30 ASSESSMENT — PAIN SCALES - WONG BAKER: WONGBAKER_NUMERICALRESPONSE: 0

## 2022-10-30 ASSESSMENT — PULMONARY FUNCTION TESTS
PIF_VALUE: 18
PIF_VALUE: 19
PIF_VALUE: 17
PIF_VALUE: 16

## 2022-10-30 NOTE — PROGRESS NOTES
Patient has been successfully weaned from Mechanical Ventilation. RSBI before extubation was 30 and SpO2 of 99 on 21% FiO2. Patient extubated and placed on 3 liters/min via nasal cannula. Post extubation SpO2 is 100% with HR  86 bpm and RR 20 breaths/min. Patient had mild cough that was productive of clear  sputum. Extubation Well tolerated by patient. .   Dr Lee  at bedside

## 2022-10-30 NOTE — PROGRESS NOTES
[employer] placed on patient Date- 10/29/2022 Time- 2140  Initial Seizure [place] (%)- 0  Ordering Provider- Razia Winter  Pertinent Events- None
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
## 2022-10-30 NOTE — PROGRESS NOTES
Patient Weaning Progress    The patient's vent settings was able to be weaned this shift. Ventilator settings that were weaned              [x] Mode   [x] Pressure support weaned   [x] Fio2 weaned   [] Peep weaned             Spontaneous weaning trial  was attempted. due to defined parameters for SBT (spontaneous breathing trial) not being met. The patient was able to tolerate SBT. RSBI  was  with EtCO2 of 40 and SpO2 of 99 on 25% FiO2. Spontanteous VT was 502 and RR 24 breaths/min. The patient was on SBT for  minutes     Evac tube was  hooked up with continuous low suction(20-30mmHg)      Cuff was  deflated to determine cuff leak. A leak  was detected. Cuff leak was 50%       Patient mutually agreed on goals. Family mutually agreed on goals.

## 2022-10-30 NOTE — PROGRESS NOTES
Patient Weaning Progress    The patient's vent settings was able to be weaned this shift. Ventilator settings that were weaned              [x] Mode   [x] Pressure support weaned   [x] Fio2 weaned   [] Peep weaned             Spontaneous weaning trial  was attempted. due to defined parameters for SBT (spontaneous breathing trial) not being met. The patient was able to tolerate SBT. RSBI  was 26 with SpO2 of 100 on 30% FiO2. Spontanteous VT was 717 and RR 19 breaths/min. The patient remains on spontaneous     Evac tube was  hooked up with continuous low suction(20-30mmHg)          Unable to get agreement for goals because no family is present and patient cannot respond.

## 2022-10-30 NOTE — PROGRESS NOTES
1003 Patient received in IR for Lumbar puncture  1010 This procedure has been fully reviewed with the patient and written informed consent has been obtained. 1021 Procedure started with Dr. Iggy Crocker. 1035 Unable to get opening pressure due to slow flow and ventilation. 1100 Procedure completed; patient tolerated well. Band aid to back; no bleeding noted. 1107 7.5mL of clear CSF fluid sent to the lab. 1110 Patient on bed; comfort ensured. 1113 Patient taken to 4D17 via bed.

## 2022-10-30 NOTE — PROCEDURES
Date: 10/28/2022  Referring physician: Juana Alexandre APRN - CNP    Indication  Patient aged 79 y with encephalopathy. EEG done to assess for epileptiform activity. Introduction  This 134-minute EEG was recorded using the [employer] one band system. Automated seizure detection algorithms were applied. Description  The background consistent of diffuse none reactive polymorphic delta and theta slowing. No consistent focal slowing or interhemispheric asymmetry was noted. Stage I and stage II sleep were observed. There were no interictal epileptiform discharges or electrographic seizures. Activations  Hyperventilation was not performed. Intermittent photic stimulation was performed and demonstrated no posterior driving response. Impression  Abnormal awake EEG. The slowing mentioned above suggests moderate non specific encephalopathy. EEG limited with artifact. No clear epileptiform discharges were identified. Please note the absence of such activity on this record cannot conclusively rule out an epileptic disorder. If such is still clinically suspected, a repeat study with sleep deprivation and/or prolonged sampling may be helpful. Please note this EEG was meant to screen for emergent condition and is prone to artifact and with some limitations. The interpretation of this EEG result should be taken only with clinical correlation. Ideally regular EEG with full leads should be considered when possible. Navi Oglesby MD  Epilepsy Board Certified. Neurology Board Certified.     Electronically Signed

## 2022-10-30 NOTE — PLAN OF CARE
Problem: Respiratory - Adult  Goal: Will be able to breathe spontaneously, without ventilator support  Description: Will be able to breathe spontaneously, without ventilator support  Outcome: Progressing   Patient remains on ventilator; tolerating well.   Will continue to monitor patients respiratory status

## 2022-10-30 NOTE — H&P
Formulation and discussion of sedation / procedure plans, risks, benefits, side effects and alternatives with patient and/or responsible adult completed. History and Physical reviewed and unchanged.     Electronically signed by Orville Rodriguez MD on 10/30/22 at 10:12 AM EDT
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
## 2022-10-31 ENCOUNTER — APPOINTMENT (OUTPATIENT)
Dept: GENERAL RADIOLOGY | Age: 67
DRG: 640 | End: 2022-10-31
Payer: MEDICARE

## 2022-10-31 LAB
ALBUMIN SERPL-MCNC: 2.4 G/DL (ref 3.5–5.1)
ALP BLD-CCNC: 117 U/L (ref 38–126)
ALT SERPL-CCNC: 26 U/L (ref 11–66)
ANION GAP SERPL CALCULATED.3IONS-SCNC: 12 MEQ/L (ref 8–16)
AST SERPL-CCNC: 49 U/L (ref 5–40)
BACTERIA: ABNORMAL
BASOPHILS # BLD: 0.1 %
BASOPHILS ABSOLUTE: 0 THOU/MM3 (ref 0–0.1)
BILIRUB SERPL-MCNC: 0.4 MG/DL (ref 0.3–1.2)
BILIRUBIN URINE: NEGATIVE
BLOOD, URINE: NEGATIVE
BUN BLDV-MCNC: 30 MG/DL (ref 7–22)
C-REACTIVE PROTEIN: 5.32 MG/DL (ref 0–1)
CALCIUM SERPL-MCNC: 7.8 MG/DL (ref 8.5–10.5)
CASTS: ABNORMAL /LPF
CASTS: ABNORMAL /LPF
CHARACTER, CSF: CLEAR
CHARACTER, CSF: CLEAR
CHARACTER, URINE: CLEAR
CHLORIDE BLD-SCNC: 118 MEQ/L (ref 98–111)
CO2: 18 MEQ/L (ref 23–33)
COLOR CSF: COLORLESS
COLOR CSF: COLORLESS
COLOR: ABNORMAL
CORTISOL COLLECTION INFO: NORMAL
CORTISOL: 10.75 UG/DL
CREAT SERPL-MCNC: 1.7 MG/DL (ref 0.4–1.2)
CREATININE URINE: 201.5 MG/DL
CRYSTALS: ABNORMAL
EOS CSF: 2 %
EOSINOPHIL # BLD: 1.2 %
EOSINOPHILS ABSOLUTE: 0.1 THOU/MM3 (ref 0–0.4)
EPITHELIAL CELLS, UA: ABNORMAL /HPF
ERYTHROCYTE [DISTWIDTH] IN BLOOD BY AUTOMATED COUNT: 15.8 % (ref 11.5–14.5)
ERYTHROCYTE [DISTWIDTH] IN BLOOD BY AUTOMATED COUNT: 51 FL (ref 35–45)
GFR SERPL CREATININE-BSD FRML MDRD: 43 ML/MIN/1.73M2
GLUCOSE BLD-MCNC: 90 MG/DL (ref 70–108)
GLUCOSE, URINE: NEGATIVE MG/DL
HCT VFR BLD CALC: 26.9 % (ref 42–52)
HEMOGLOBIN: 8.1 GM/DL (ref 14–18)
IMMATURE GRANS (ABS): 0.03 THOU/MM3 (ref 0–0.07)
IMMATURE GRANULOCYTES: 0.4 %
KETONES, URINE: NEGATIVE
LEUKOCYTE EST, POC: ABNORMAL
LYMPHOCYTES # BLD: 8.7 %
LYMPHOCYTES ABSOLUTE: 0.6 THOU/MM3 (ref 1–4.8)
LYMPHS CSF: 45 % (ref 0–90)
LYMPHS CSF: 50 % (ref 0–90)
MCH RBC QN AUTO: 26.9 PG (ref 26–33)
MCHC RBC AUTO-ENTMCNC: 30.1 GM/DL (ref 32.2–35.5)
MCV RBC AUTO: 89.4 FL (ref 80–94)
MISCELLANEOUS LAB TEST RESULT: ABNORMAL
MONOCYTE, CSF: 15 % (ref 0–45)
MONOCYTE, CSF: 50 % (ref 0–45)
MONOCYTES # BLD: 8.6 %
MONOCYTES ABSOLUTE: 0.6 THOU/MM3 (ref 0.4–1.3)
NITRITE, URINE: NEGATIVE
NUCLEATED RED BLOOD CELLS: 0 /100 WBC
OSMOLALITY URINE: 679 MOSMOL/KG (ref 250–750)
PATHOLOGIST REVIEW: ABNORMAL
PATHOLOGIST REVIEW: ABNORMAL
PH UA: 5 (ref 5–9)
PH UA: 5 (ref 5–9)
PLATELET # BLD: 65 THOU/MM3 (ref 130–400)
PLATELET ESTIMATE: ABNORMAL
PMV BLD AUTO: 12 FL (ref 9.4–12.4)
POTASSIUM SERPL-SCNC: 3.1 MEQ/L (ref 3.5–5.2)
POTASSIUM SERPL-SCNC: 3.2 MEQ/L (ref 3.5–5.2)
POTASSIUM, URINE: 61.1 MEQ/L
PROTEIN UA: NEGATIVE MG/DL
RBC # BLD: 3.01 MILL/MM3 (ref 4.7–6.1)
RBC CSF: 14 /CUMM
RBC CSF: 230 /CUMM
RBC URINE: ABNORMAL /HPF
RENAL EPITHELIAL, UA: ABNORMAL
SCAN OF BLOOD SMEAR: NORMAL
SEG NEUTROPHILS: 81 %
SEGMENTED NEUTROPHILS ABSOLUTE COUNT: 5.6 THOU/MM3 (ref 1.8–7.7)
SEGS, CSF: 38 % (ref 0–6)
SODIUM BLD-SCNC: 148 MEQ/L (ref 135–145)
SODIUM URINE: 25 MEQ/L
SPECIFIC GRAVITY UA: 1.02 (ref 1–1.03)
TOTAL NUCLEATED CELLS CSF: 1 /CUMM (ref 0–5)
TOTAL NUCLEATED CELLS CSF: 2 /CUMM (ref 0–5)
TOTAL PROTEIN: 5.1 G/DL (ref 6.1–8)
TOXIC GRANULATION: PRESENT
TUBE VOLUME RECEIVED CSF: 1 ML
TUBE VOLUME RECEIVED CSF: 2 ML
UREA NITROGEN, UR: 1338 MG/DL
UROBILINOGEN, URINE: 1 EU/DL (ref 0–1)
WBC # BLD: 6.9 THOU/MM3 (ref 4.8–10.8)
WBC UA: ABNORMAL /HPF
YEAST: ABNORMAL

## 2022-10-31 PROCEDURE — 82570 ASSAY OF URINE CREATININE: CPT

## 2022-10-31 PROCEDURE — 2580000003 HC RX 258

## 2022-10-31 PROCEDURE — 81003 URINALYSIS AUTO W/O SCOPE: CPT

## 2022-10-31 PROCEDURE — 95816 EEG AWAKE AND DROWSY: CPT

## 2022-10-31 PROCEDURE — 86140 C-REACTIVE PROTEIN: CPT

## 2022-10-31 PROCEDURE — 6360000002 HC RX W HCPCS: Performed by: INTERNAL MEDICINE

## 2022-10-31 PROCEDURE — 99291 CRITICAL CARE FIRST HOUR: CPT | Performed by: INTERNAL MEDICINE

## 2022-10-31 PROCEDURE — 84132 ASSAY OF SERUM POTASSIUM: CPT

## 2022-10-31 PROCEDURE — 84133 ASSAY OF URINE POTASSIUM: CPT

## 2022-10-31 PROCEDURE — 80053 COMPREHEN METABOLIC PANEL: CPT

## 2022-10-31 PROCEDURE — 2060000000 HC ICU INTERMEDIATE R&B

## 2022-10-31 PROCEDURE — 82533 TOTAL CORTISOL: CPT

## 2022-10-31 PROCEDURE — 2500000003 HC RX 250 WO HCPCS

## 2022-10-31 PROCEDURE — 84540 ASSAY OF URINE/UREA-N: CPT

## 2022-10-31 PROCEDURE — 2580000003 HC RX 258: Performed by: INTERNAL MEDICINE

## 2022-10-31 PROCEDURE — 85025 COMPLETE CBC W/AUTO DIFF WBC: CPT

## 2022-10-31 PROCEDURE — 2580000003 HC RX 258: Performed by: NURSE PRACTITIONER

## 2022-10-31 PROCEDURE — 99232 SBSQ HOSP IP/OBS MODERATE 35: CPT | Performed by: PHYSICIAN ASSISTANT

## 2022-10-31 PROCEDURE — 83935 ASSAY OF URINE OSMOLALITY: CPT

## 2022-10-31 PROCEDURE — 2500000003 HC RX 250 WO HCPCS: Performed by: PHYSICIAN ASSISTANT

## 2022-10-31 PROCEDURE — 84300 ASSAY OF URINE SODIUM: CPT

## 2022-10-31 PROCEDURE — 6360000002 HC RX W HCPCS: Performed by: NURSE PRACTITIONER

## 2022-10-31 PROCEDURE — 6360000002 HC RX W HCPCS: Performed by: STUDENT IN AN ORGANIZED HEALTH CARE EDUCATION/TRAINING PROGRAM

## 2022-10-31 PROCEDURE — 36415 COLL VENOUS BLD VENIPUNCTURE: CPT

## 2022-10-31 PROCEDURE — 92610 EVALUATE SWALLOWING FUNCTION: CPT

## 2022-10-31 PROCEDURE — 81001 URINALYSIS AUTO W/SCOPE: CPT

## 2022-10-31 PROCEDURE — 71045 X-RAY EXAM CHEST 1 VIEW: CPT

## 2022-10-31 PROCEDURE — C9113 INJ PANTOPRAZOLE SODIUM, VIA: HCPCS

## 2022-10-31 PROCEDURE — 93005 ELECTROCARDIOGRAM TRACING: CPT | Performed by: INTERNAL MEDICINE

## 2022-10-31 PROCEDURE — 6360000002 HC RX W HCPCS

## 2022-10-31 RX ORDER — HYDRALAZINE HYDROCHLORIDE 20 MG/ML
5 INJECTION INTRAMUSCULAR; INTRAVENOUS EVERY 6 HOURS PRN
Status: DISCONTINUED | OUTPATIENT
Start: 2022-10-31 | End: 2022-11-11 | Stop reason: HOSPADM

## 2022-10-31 RX ORDER — POTASSIUM CHLORIDE 20 MEQ/1
40 TABLET, EXTENDED RELEASE ORAL PRN
Status: DISCONTINUED | OUTPATIENT
Start: 2022-10-31 | End: 2022-11-11 | Stop reason: HOSPADM

## 2022-10-31 RX ORDER — DEXAMETHASONE SODIUM PHOSPHATE 4 MG/ML
6 INJECTION, SOLUTION INTRA-ARTICULAR; INTRALESIONAL; INTRAMUSCULAR; INTRAVENOUS; SOFT TISSUE ONCE
Status: COMPLETED | OUTPATIENT
Start: 2022-10-31 | End: 2022-10-31

## 2022-10-31 RX ORDER — POTASSIUM CHLORIDE 7.45 MG/ML
10 INJECTION INTRAVENOUS PRN
Status: DISCONTINUED | OUTPATIENT
Start: 2022-10-31 | End: 2022-11-11 | Stop reason: HOSPADM

## 2022-10-31 RX ORDER — PROPRANOLOL HYDROCHLORIDE 20 MG/1
20 TABLET ORAL 2 TIMES DAILY
Status: DISCONTINUED | OUTPATIENT
Start: 2022-10-31 | End: 2022-11-02

## 2022-10-31 RX ORDER — METOPROLOL TARTRATE 5 MG/5ML
5 INJECTION INTRAVENOUS EVERY 6 HOURS PRN
Status: DISCONTINUED | OUTPATIENT
Start: 2022-10-31 | End: 2022-11-11 | Stop reason: HOSPADM

## 2022-10-31 RX ADMIN — POTASSIUM CHLORIDE 10 MEQ: 7.46 INJECTION, SOLUTION INTRAVENOUS at 16:48

## 2022-10-31 RX ADMIN — POTASSIUM CHLORIDE 10 MEQ: 7.46 INJECTION, SOLUTION INTRAVENOUS at 23:08

## 2022-10-31 RX ADMIN — SODIUM CHLORIDE, PRESERVATIVE FREE 10 ML: 5 INJECTION INTRAVENOUS at 19:25

## 2022-10-31 RX ADMIN — METOPROLOL TARTRATE 5 MG: 5 INJECTION, SOLUTION INTRAVENOUS at 23:59

## 2022-10-31 RX ADMIN — HYDRALAZINE HYDROCHLORIDE 5 MG: 20 INJECTION INTRAMUSCULAR; INTRAVENOUS at 19:58

## 2022-10-31 RX ADMIN — PANTOPRAZOLE SODIUM 40 MG: 40 INJECTION, POWDER, FOR SOLUTION INTRAVENOUS at 06:52

## 2022-10-31 RX ADMIN — POTASSIUM CHLORIDE 10 MEQ: 7.46 INJECTION, SOLUTION INTRAVENOUS at 18:04

## 2022-10-31 RX ADMIN — THIAMINE HYDROCHLORIDE 500 MG: 100 INJECTION, SOLUTION INTRAMUSCULAR; INTRAVENOUS at 18:03

## 2022-10-31 RX ADMIN — DEXAMETHASONE SODIUM PHOSPHATE 6 MG: 4 INJECTION, SOLUTION INTRA-ARTICULAR; INTRALESIONAL; INTRAMUSCULAR; INTRAVENOUS; SOFT TISSUE at 13:05

## 2022-10-31 RX ADMIN — SODIUM CHLORIDE: 4.5 INJECTION, SOLUTION INTRAVENOUS at 09:01

## 2022-10-31 RX ADMIN — THIAMINE HYDROCHLORIDE 500 MG: 100 INJECTION, SOLUTION INTRAMUSCULAR; INTRAVENOUS at 10:00

## 2022-10-31 RX ADMIN — SODIUM CHLORIDE, PRESERVATIVE FREE 10 ML: 5 INJECTION INTRAVENOUS at 08:15

## 2022-10-31 RX ADMIN — SODIUM CHLORIDE: 4.5 INJECTION, SOLUTION INTRAVENOUS at 21:45

## 2022-10-31 RX ADMIN — FOLIC ACID 1 MG: 5 INJECTION, SOLUTION INTRAMUSCULAR; INTRAVENOUS; SUBCUTANEOUS at 08:14

## 2022-10-31 RX ADMIN — ENOXAPARIN SODIUM 40 MG: 100 INJECTION SUBCUTANEOUS at 08:12

## 2022-10-31 RX ADMIN — POTASSIUM CHLORIDE 10 MEQ: 7.46 INJECTION, SOLUTION INTRAVENOUS at 19:25

## 2022-10-31 RX ADMIN — POTASSIUM CHLORIDE 10 MEQ: 7.46 INJECTION, SOLUTION INTRAVENOUS at 21:40

## 2022-10-31 RX ADMIN — CEFTRIAXONE 2000 MG: 2 INJECTION, POWDER, FOR SOLUTION INTRAMUSCULAR; INTRAVENOUS at 01:03

## 2022-10-31 ASSESSMENT — PAIN SCALES - WONG BAKER
WONGBAKER_NUMERICALRESPONSE: 0
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
## 2022-10-31 NOTE — SIGNIFICANT EVENT
Pt is a 78 yo M admitted for AMS with admission to ICU for elective intubation and sedation for MRI and LP. These were completed and patient subsequently extubated. He is stable now though continues to be altered. Sign out given to Dr. Teresa Claudio. She did accept patient transfer.

## 2022-10-31 NOTE — FLOWSHEET NOTE
10/31/22 1150   Safe Environment   Safety Measures Standard Safety Measures; Other (comment)  (Virtual Safety Check)   Visual safety check completed due to patient AMS and agitation. Patient semi fowlers in bed. Restless/agitated. Bed alarm on. Side rails up. Bedside RN aware. Patient is recent transfer to .

## 2022-10-31 NOTE — PROGRESS NOTES
Pharmacy Medication History Note      List of current medications patient is taking is complete. Source of information: [facility] [address] made to medication list:  Medications removed (include reason, ex. therapy complete or physician discontinued):  D/c Aspirin 81mg Daily  D/c Carvedilol 6.25mg BID  D/c Famotidine 20mg daily  D/c Hydralazine 50mg Q8H   D/c Ondansetron 4mg Q8H PRN  D/c Sucralfate 1gm QID    Medications added/doses adjusted:  Adjusted Topiramate 25mg to 50mg BID per ECF RN  Adjusted pantoprazole to 40 mg BID  Adjusted lorazepam to 1 mg TID  Adjusted divalproex to sprinkle capsule    Other notes (ex. Recent course of antibiotics, Coumadin dosing):  Denies use of other OTC or herbal medications.       Allergies reviewed      Electronically signed by Lolis Rodriguez on 10/31/2022 at 2:33 PM

## 2022-10-31 NOTE — CARE COORDINATION
10/31/22, 2:38 PM EDT  Discharge Planning Evaluation  Social work consult received, patient from [facility]. Patient/Family preference is to return to . Attempting to reach sister Tiffanie. The patient's current payor source at the facility is Medicaid. Medicare skilled days available: Niurka Brinka from [address] [place] [address] checking on days remaining. Insurance precert:  Niurka Alvarez will let SILAS know. Spoke with Yehuda Early in Admissions at the facility. Patient bed hold: unsure at this time, see note below. Anticipated transport plan: to be determined. Do they require COVID 19 test to return to [facility]: unsure at this time. Is there a required time frame which which COVID test needs done: unsure at this time. Patient's Healthcare Decision Maker: Legal Next of Kin     SILAS spoke with Unique Summers at [address] [place] [address]. States pt came to them from [facility] early October and shortly after arrival pt started exhibiting behavioral issues. [facility] sent pt to [facility] x 2 weeks and returned to [address] [place] [address]. Niurka Alvarez states pt started having behavioral issues again and are uncertain if [facility] is the appropriate placement for pt. Niurka Alvarez states [address] [place] [address] will send a nurse to evaluate pt for possible return when pt is medically ready.
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
## 2022-10-31 NOTE — CARE COORDINATION
Case Management Assessment  Initial Evaluation    Date/Time of Evaluation: 10/31/2022 11:19 AM  Assessment Completed by: Cheryl Jiménez RN    If patient is discharged prior to next notation, then this note serves as note for discharge by case management. Patient Name: Heydi Aguilar                   YOB: 1955  Diagnosis: Fall at home, initial encounter [W19. XXXA, Y92.009]  Altered mental status [R41.82]                   Date / Time: 10/29/2022  7:54 AM    Patient Admission Status: Inpatient     Current PCP: Jud Gomez    Chart Reviewed: Yes        Hospitalization in the last 30 days (Readmission):  No    If yes, Readmission Assessment in CM Navigator will be completed. Advance Directives:     Code Status: Full Code     CM Assessment deferred to  as patient is from SNF. Potential Assistance needed at discharge: [address] [place] [address] Street  Patient expects to discharge to: Other (comment)  Plan for transportation at discharge:      Financial  Payor: Patty Pryor / Plan: Addis Oconnor / Product Type: *No Product type* /     Does insurance require precert for SNF: Yes    Potential assistance Purchasing Medications:    Meds-to-Beds request: Yes      [facility] [id number] - [place], [address] [phone] - F [phone]  [address] [place] [address]  Phone: [phone] Fax: [phone]      Factors facilitating achievement of predicted outcomes: Family support    Barriers to discharge: Medical complications and Medication managment    Additional Case Management Notes: Presented from [facility] to ED with c/o AMS, agitation. He had recently completed a 2 week psychiatric stay at a facility in [facility] and returned to [facility] on 10/27/2022. Family went to visit on 10/28 and reports patient was very thirsty, drinking 2 quarts soda/juice.  ECF reports had had crawled out of bed, found on floor, became agitated when they tried to get him up. Admitted to . Neurology consulted for AMS, hx of ETOH abuse with last drink 9/30/22, increased agitation, hx of TBI, R SDH s/p craniotomy. Transferred to ICU on 10/29 for elective intubation for sedation for MRI brain and LP. During intubation, large amounts of thick tan-yellow secretions in oropharynx, poor cough/gag. EEG neg for seizures. LP negative. Extubated yesterday. Tmax 99.8. NSR. On room air. Oriented to self only. PT/OT. Dietitian consulted. TElemetry, n/v checks, SCDs. IVF, IV rocephin, lovenox, IV folic acid, IV protonix, IV thiamine. Received 1L fld bolus x1 yesterday. Received 6 mg IV decadron x1 today. Procedures:   10/29 CXR: No acute process  10/29 XR Left Tib/Fib: No acute fracture or dislocation  10/29 CT Head: No acute findings  10/29 CT Facial Bones: No fractures noted  10/29 CT Cervical Spine: No fractures noted  10/29 Intubated  10/30 MRI Brain: No acute findings; Areas of volume loss and abnormal signal in the inferior right frontal lobe and the right parietal lobe. Both these areas have associated prior hemorrhage. These could be posttraumatic injuries versus sites of old infarct. These do not appear acute; Mild severity chronic small vessel ischemic changes  10/30 EEG: No seizures noted; suggests moderate encelphaopathy  10/30 LP: Negative  10/30 Extubated  10/31 CXR:   1. Interval removal of the endotracheal tube. 2. Borderline cardiomegaly. 3. Slight increased density at both lung bases. 4. Atherosclerotic calcification in the aortic arch       The Plan for Transition of Care is related to the following treatment goals of Fall at home, initial encounter [W19. Gissel Woodward, Y92.009]  Altered mental status [R41.82]    Patient Goals/Plan/Treatment Preferences: From [facility]. Spoke with Vasyl's sister, Rian Lr; states Pradeep Rangel had been using a walker and working with therapy prior to leaving [employer] to go to [facility].  She is unsure if he had been up with therapy before coming to [address] [place] [address]. He did not wear O2 or use PAP at SNF. SW on case for discharge planning. Idalia Nino, RN  Case Management Department    Pt transferred to [facility]. Handoff report given to [place], 4K CM.

## 2022-10-31 NOTE — PROGRESS NOTES
Aaron Gonsales 60  PHYSICAL THERAPY MISSED TREATMENT NOTE  [facility] ICU STEPDOWN TELEMETRY 4K    Date: 10/31/2022  Patient Name: Gaetano Penn        MRN: [id number]   : 1955  (79 y.o.)  Gender: male                REASON FOR MISSED TREATMENT:  Missed Treat. Tech in pt room getting ready to start EEG at 1440. Will check back as able.
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
## 2022-10-31 NOTE — PROGRESS NOTES
Physician Progress Note      Jd Jackson  CSN #:                  [id number]  :                       1955  ADMIT DATE:       10/29/2022 7:54 AM  [address] [place] DATE:  mervin  PROVIDER #:        Arnold Jordan DO          QUERY TEXT:    Dr Olya Fraser, Dr Ailyn Banks, Dr Julian Jaime,    Pt admitted with AMS/Metabolic Encephalopathy. Pt noted to have recent CVA one   month ago & residual left-sided weakness that were sequelae from skull   fracture and subarachnoid hemorrhage 1 year ago. If possible, please document   in progress notes and discharge summary if you are evaluating and/or treating   any of the following: The medical record reflects the following:  Risk Factors: CVA one month ago & residual left-sided weakness that were   sequelae from skull fracture and subarachnoid hemorrhage 1 year ago  Clinical Indicators: AMS, Metabolic encephalopathy, frequent falls, agitation. Treatment: intubation and ICU  Options provided:  -- AMS/Metabolic Encephalopathy is a sequela of prior CVA  -- AMS/Metabolic Encephalopathy as a sequela of prior subarachnoid hemorrhage  -- AMS/Metabolic Encephalopathy related to aspiration PNA-POA. -- AMS/Metabolic Encephalopathy is not a sequela of prior CVA  -- Other - I will add my own diagnosis  -- Disagree - Not applicable / Not valid  -- Disagree - Clinically unable to determine / Unknown  -- Refer to Clinical Documentation Reviewer    PROVIDER RESPONSE TEXT:    AMS/Metabolic Encephalopathy is not a sequela of prior CVA.     Query created by: Sienna Mendez on 10/31/2022 7:11 AM      Electronically signed by:  Arnold Jordan DO 10/31/2022 9:51 AM
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
## 2022-11-01 ENCOUNTER — APPOINTMENT (OUTPATIENT)
Dept: GENERAL RADIOLOGY | Age: 67
DRG: 640 | End: 2022-11-01
Payer: MEDICARE

## 2022-11-01 PROBLEM — E87.0 HYPERNATREMIA: Status: ACTIVE | Noted: 2022-11-01

## 2022-11-01 PROBLEM — N17.9 AKI (ACUTE KIDNEY INJURY) (HCC): Status: ACTIVE | Noted: 2022-11-01

## 2022-11-01 LAB
ALBUMIN SERPL-MCNC: 2.5 G/DL (ref 3.5–5.1)
ALP BLD-CCNC: 125 U/L (ref 38–126)
ALT SERPL-CCNC: 28 U/L (ref 11–66)
ANION GAP SERPL CALCULATED.3IONS-SCNC: 11 MEQ/L (ref 8–16)
AST SERPL-CCNC: 48 U/L (ref 5–40)
BASOPHILS # BLD: 0 %
BASOPHILS ABSOLUTE: 0 THOU/MM3 (ref 0–0.1)
BILIRUB SERPL-MCNC: 0.4 MG/DL (ref 0.3–1.2)
BUN BLDV-MCNC: 23 MG/DL (ref 7–22)
CALCIUM SERPL-MCNC: 8.2 MG/DL (ref 8.5–10.5)
CHLORIDE BLD-SCNC: 118 MEQ/L (ref 98–111)
CO2: 17 MEQ/L (ref 23–33)
CREAT SERPL-MCNC: 1.2 MG/DL (ref 0.4–1.2)
EKG ATRIAL RATE: 119 BPM
EKG P AXIS: 77 DEGREES
EKG P-R INTERVAL: 152 MS
EKG Q-T INTERVAL: 338 MS
EKG QRS DURATION: 92 MS
EKG QTC CALCULATION (BAZETT): 475 MS
EKG R AXIS: 77 DEGREES
EKG T AXIS: 35 DEGREES
EKG VENTRICULAR RATE: 119 BPM
EOSINOPHIL # BLD: 0.2 %
EOSINOPHILS ABSOLUTE: 0 THOU/MM3 (ref 0–0.4)
ERYTHROCYTE [DISTWIDTH] IN BLOOD BY AUTOMATED COUNT: 16 % (ref 11.5–14.5)
ERYTHROCYTE [DISTWIDTH] IN BLOOD BY AUTOMATED COUNT: 52.1 FL (ref 35–45)
GFR SERPL CREATININE-BSD FRML MDRD: > 60 ML/MIN/1.73M2
GLUCOSE BLD-MCNC: 107 MG/DL (ref 70–108)
HCT VFR BLD CALC: 26.4 % (ref 42–52)
HEMOGLOBIN: 8 GM/DL (ref 14–18)
IMMATURE GRANS (ABS): 0.04 THOU/MM3 (ref 0–0.07)
IMMATURE GRANULOCYTES: 0.9 %
LYMPHOCYTES # BLD: 13.5 %
LYMPHOCYTES ABSOLUTE: 0.6 THOU/MM3 (ref 1–4.8)
MCH RBC QN AUTO: 26.9 PG (ref 26–33)
MCHC RBC AUTO-ENTMCNC: 30.3 GM/DL (ref 32.2–35.5)
MCV RBC AUTO: 88.9 FL (ref 80–94)
MONOCYTES # BLD: 6.6 %
MONOCYTES ABSOLUTE: 0.3 THOU/MM3 (ref 0.4–1.3)
NUCLEATED RED BLOOD CELLS: 0 /100 WBC
OSMOLALITY URINE: 506 MOSMOL/KG (ref 250–750)
OSMOLALITY: 310 MOSMOL/KG (ref 275–295)
PLATELET # BLD: 66 THOU/MM3 (ref 130–400)
PMV BLD AUTO: 12 FL (ref 9.4–12.4)
POTASSIUM SERPL-SCNC: 4 MEQ/L (ref 3.5–5.2)
PROCALCITONIN: 0.47 NG/ML (ref 0.01–0.09)
RBC # BLD: 2.97 MILL/MM3 (ref 4.7–6.1)
SEG NEUTROPHILS: 78.8 %
SEGMENTED NEUTROPHILS ABSOLUTE COUNT: 3.7 THOU/MM3 (ref 1.8–7.7)
SODIUM BLD-SCNC: 143 MEQ/L (ref 135–145)
SODIUM BLD-SCNC: 146 MEQ/L (ref 135–145)
SODIUM URINE: 93 MEQ/L
TOTAL PROTEIN: 5.5 G/DL (ref 6.1–8)
VDRL, QUANTITATIVE: NONREACTIVE
WBC # BLD: 4.7 THOU/MM3 (ref 4.8–10.8)

## 2022-11-01 PROCEDURE — 83930 ASSAY OF BLOOD OSMOLALITY: CPT

## 2022-11-01 PROCEDURE — 95816 EEG AWAKE AND DROWSY: CPT | Performed by: PSYCHIATRY & NEUROLOGY

## 2022-11-01 PROCEDURE — 99223 1ST HOSP IP/OBS HIGH 75: CPT | Performed by: INTERNAL MEDICINE

## 2022-11-01 PROCEDURE — 6360000002 HC RX W HCPCS: Performed by: NURSE PRACTITIONER

## 2022-11-01 PROCEDURE — 6360000002 HC RX W HCPCS: Performed by: STUDENT IN AN ORGANIZED HEALTH CARE EDUCATION/TRAINING PROGRAM

## 2022-11-01 PROCEDURE — 2580000003 HC RX 258: Performed by: STUDENT IN AN ORGANIZED HEALTH CARE EDUCATION/TRAINING PROGRAM

## 2022-11-01 PROCEDURE — 71045 X-RAY EXAM CHEST 1 VIEW: CPT

## 2022-11-01 PROCEDURE — 2060000000 HC ICU INTERMEDIATE R&B

## 2022-11-01 PROCEDURE — 80053 COMPREHEN METABOLIC PANEL: CPT

## 2022-11-01 PROCEDURE — 93010 ELECTROCARDIOGRAM REPORT: CPT | Performed by: INTERNAL MEDICINE

## 2022-11-01 PROCEDURE — C9113 INJ PANTOPRAZOLE SODIUM, VIA: HCPCS

## 2022-11-01 PROCEDURE — 97162 PT EVAL MOD COMPLEX 30 MIN: CPT

## 2022-11-01 PROCEDURE — 6360000002 HC RX W HCPCS

## 2022-11-01 PROCEDURE — 97166 OT EVAL MOD COMPLEX 45 MIN: CPT

## 2022-11-01 PROCEDURE — 84145 PROCALCITONIN (PCT): CPT

## 2022-11-01 PROCEDURE — 2580000003 HC RX 258: Performed by: INTERNAL MEDICINE

## 2022-11-01 PROCEDURE — 99232 SBSQ HOSP IP/OBS MODERATE 35: CPT | Performed by: STUDENT IN AN ORGANIZED HEALTH CARE EDUCATION/TRAINING PROGRAM

## 2022-11-01 PROCEDURE — 84295 ASSAY OF SERUM SODIUM: CPT

## 2022-11-01 PROCEDURE — 83935 ASSAY OF URINE OSMOLALITY: CPT

## 2022-11-01 PROCEDURE — 2500000003 HC RX 250 WO HCPCS

## 2022-11-01 PROCEDURE — 2500000003 HC RX 250 WO HCPCS: Performed by: STUDENT IN AN ORGANIZED HEALTH CARE EDUCATION/TRAINING PROGRAM

## 2022-11-01 PROCEDURE — 74230 X-RAY XM SWLNG FUNCJ C+: CPT

## 2022-11-01 PROCEDURE — 36415 COLL VENOUS BLD VENIPUNCTURE: CPT

## 2022-11-01 PROCEDURE — 2580000003 HC RX 258: Performed by: NURSE PRACTITIONER

## 2022-11-01 PROCEDURE — 92526 ORAL FUNCTION THERAPY: CPT

## 2022-11-01 PROCEDURE — 84300 ASSAY OF URINE SODIUM: CPT

## 2022-11-01 PROCEDURE — 92611 MOTION FLUOROSCOPY/SWALLOW: CPT

## 2022-11-01 PROCEDURE — 97535 SELF CARE MNGMENT TRAINING: CPT

## 2022-11-01 PROCEDURE — 85025 COMPLETE CBC W/AUTO DIFF WBC: CPT

## 2022-11-01 PROCEDURE — 2580000003 HC RX 258

## 2022-11-01 RX ORDER — DEXTROSE MONOHYDRATE 50 MG/ML
INJECTION, SOLUTION INTRAVENOUS CONTINUOUS
Status: DISCONTINUED | OUTPATIENT
Start: 2022-11-01 | End: 2022-11-03

## 2022-11-01 RX ORDER — DIVALPROEX SODIUM 125 MG/1
250 CAPSULE, COATED PELLETS ORAL 3 TIMES DAILY
Status: ON HOLD | COMMUNITY
End: 2022-11-11 | Stop reason: HOSPADM

## 2022-11-01 RX ORDER — PANTOPRAZOLE SODIUM 40 MG/1
40 TABLET, DELAYED RELEASE ORAL 2 TIMES DAILY
COMMUNITY

## 2022-11-01 RX ADMIN — PANTOPRAZOLE SODIUM 40 MG: 40 INJECTION, POWDER, FOR SOLUTION INTRAVENOUS at 05:45

## 2022-11-01 RX ADMIN — FOLIC ACID 1 MG: 5 INJECTION, SOLUTION INTRAMUSCULAR; INTRAVENOUS; SUBCUTANEOUS at 10:52

## 2022-11-01 RX ADMIN — SODIUM CHLORIDE: 4.5 INJECTION, SOLUTION INTRAVENOUS at 07:57

## 2022-11-01 RX ADMIN — BARIUM SULFATE 20 ML: 400 SUSPENSION ORAL at 10:32

## 2022-11-01 RX ADMIN — POTASSIUM CHLORIDE 10 MEQ: 7.46 INJECTION, SOLUTION INTRAVENOUS at 00:15

## 2022-11-01 RX ADMIN — ENOXAPARIN SODIUM 40 MG: 100 INJECTION SUBCUTANEOUS at 11:09

## 2022-11-01 RX ADMIN — SODIUM CHLORIDE, PRESERVATIVE FREE 10 ML: 5 INJECTION INTRAVENOUS at 11:20

## 2022-11-01 RX ADMIN — BARIUM SULFATE 10 ML: 400 PASTE ORAL at 10:32

## 2022-11-01 RX ADMIN — DEXTROSE MONOHYDRATE: 50 INJECTION, SOLUTION INTRAVENOUS at 17:24

## 2022-11-01 RX ADMIN — CEFTRIAXONE 2000 MG: 2 INJECTION, POWDER, FOR SOLUTION INTRAMUSCULAR; INTRAVENOUS at 01:39

## 2022-11-01 RX ADMIN — BARIUM SULFATE 20 ML: 0.81 POWDER, FOR SUSPENSION ORAL at 10:32
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
## 2022-11-01 NOTE — CARE COORDINATION
11/1/22, 10:09 AM EDT    DISCHARGE PLANNING EVALUATION     SW spoke with pts sister Junior Rao, made her aware that [facility] RIBEIRO [facility] II[facility]MATTJOSE Diaz will need to eval pt closer to discharge to approve pt returning. Junior Rao seemed surprised by this although willing to make back up plan if needed. Junior Rao states she did speak with [facility] earlier and placed pt on waiting list. Junior Rao states she will not agree to pt returning to [facility] due to pt being \"over medicated\". Junior Rao states she will discuss situation with her siblings and report back to [address] [place] [address] on 4. Sw notified Xavier Murphy of pt transfer to . Kate Sanchez states they plan to send a nurse to see pt as he approaches closer to discharge. Kate Sanchez is aware of sw updating pts sister Junior Rao.

## 2022-11-01 NOTE — CARE COORDINATION
11/1/22, 7:39 AM EDT    DISCHARGE ON GOING EVALUATION    [facility] day: 3  Location: [facility]27/027-A Reason for admit: Fall at home, initial encounter [W19. XXXA, Y92.009]  Altered mental status [R41.82]   Procedure:   10/29 Intubated  10/30 MRI Brain: No acute findings; Areas of volume loss and abnormal signal in the inferior right frontal lobe and the right parietal lobe. Both these areas have associated prior hemorrhage. These could be posttraumatic injuries versus sites of old infarct. These do not appear acute; Mild severity chronic small vessel ischemic changes  10/30 EEG: No seizures noted; suggests moderate encelphaopathy  10/30 LP: Negative  10/30 Extubated  10/31 CXR: Interval removal of the endotracheal tube. Borderline cardiomegaly. Slight increased density at both lung bases. Atherosclerotic calcification in the aortic arch. 10/31 EEG: This EEG was abnormal due to disorganized and slow background in polymorphic delta and theta frequency. Superimposed fast beta activity was seen over the right frontocentral leads. Barriers to Discharge: Transferred from ICU to . Hospitalist, Neurology following. PT/OT/SLP. Respiratory culture growing Staph Aureus. Pneumonia PCR showed E. Coli, H. Flu, Staph Aureus. Afebrile. Room air, sats 96%. Sodium 146, chloride 118, CO2 17, BUN 23, creatinine 1.2. Albumin 2.5. Hgb 8.0. Platelets 66. IVF. Rocephin iv daily. Lovenox, Folic Acid iv daily, Protonix iv daily. Decadron 6mg iv x1 and Thiamine iv given yesterday. PCP: Rola Garnica  Readmission Risk Score: 18.1%  Patient Goals/Plan/Treatment Preferences: From [facility] ARGUETA II[facility]DYLON Diaz. SILAS consulted.

## 2022-11-01 NOTE — PROCEDURES
EEG REPORT       Patient: Joe Gonzales Age: 79 y.o. MRN: [id number]      Referring Provider: No ref. provider found    History: This routine 30 minute scalp EEG was recorded with video- monitoring for a 79 y.o.. male who presented with encephalopathy. This EEG was performed to evaluate for focal and epileptiform abnormalities.      Joe Gonzales   Current Facility-Administered Medications   Medication Dose Route Frequency Provider Last Rate Last Admin    cefTRIAXone (ROCEPHIN) 2,000 mg in dextrose 5 % 50 mL IVPB mini-bag  2,000 mg IntraVENous Q24H Viviana Brink MD        potassium chloride (KLOR-CON M) extended release tablet 40 mEq  40 mEq Oral PRN Devi M Yuhas, DO        Or    potassium bicarb-citric acid (EFFER-K) effervescent tablet 40 mEq  40 mEq Oral PRN Devi M Yuhas, DO        Or    potassium chloride 10 mEq/100 mL IVPB (Peripheral Line)  10 mEq IntraVENous PRN Devi M Yuhas,  mL/hr at 11/01/22 0015 10 mEq at 11/01/22 0015    hydrALAZINE (APRESOLINE) injection 5 mg  5 mg IntraVENous Q6H PRN Devi M Yuhas, DO   5 mg at 10/31/22 1958    [Held by provider] propranolol (INDERAL) tablet 20 mg  20 mg Oral BID Maureen Sanchez PA-C        metoprolol (LOPRESSOR) injection 5 mg  5 mg IntraVENous Q6H PRN [employer], PA-C   5 mg at 10/31/22 2359    0.45 % sodium chloride infusion   IntraVENous Continuous PR[place]IN [place], APRN -  mL/hr at 10/31/22 2145 New Bag at 10/31/22 2145    pantoprazole (PROTONIX) injection 40 mg  40 mg IntraVENous Daily Caesar Bolivar MD   40 mg at 75/13/04 7273    folic acid 1 mg in sodium chloride 0.9 % 50 mL IVPB  1 mg IntraVENous Daily Caesar Bolivar MD   Stopped at 10/31/22 7973    sodium chloride flush 0.9 % injection 5-40 mL  5-40 mL IntraVENous 2 times per day [place], APRN - CNP   10 mL at 10/31/22 1925    sodium chloride flush 0.9 % injection 5-40 mL  5-40 mL IntraVENous PRN [place], APRN - CNP        0.9 % sodium frontocentral leads suggested underlying skull defect.     Justino Farnsworth MD  Diplomate, American Board of Psychiatry and Neurology  Diplomate, American Board of Clinical Neurophysiology  Diplomate, American Board of Epilepsy
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
## 2022-11-01 NOTE — PROGRESS NOTES
[facility] ICU STEPDOWN TELEMETRY 4K  DAILY NOTE    TIME   SLP Individual Minutes  Time In: 8139  Time Out: 0692  Minutes: 13  Timed Code Treatment Minutes: 0 Minutes       Date: 2022  Patient Name: Elissa Plaza      CSN: [id number]   : 1955  (79 y.o.)  Gender: male   Referring Physician:  Lavon Mora MD    Diagnosis: Altered mental status  Precautions: Aspiration Risk, Fall Risk  Current Diet: NPO - no source of nutrition/hydration   Swallowing Strategies:  NPO + Oral Care   Date of Last MBS/FEES: Not Applicable    Pain:  No pain reported. Subjective:  LILA Baez approved completion of dysphagia tx this date. Upon arrival to room, patient awake in bed with OT Caroline at bedside. Patient with adequate alertness; however, continued confused mentation. Short-Term Goals:  SHORT TERM GOAL #1:  Goal 1: Patient will complete conservative PO trials (ice chips) with ST ONLY to determine readiness for formal instrumentation to further evaluate dysphagia management. INTERVENTIONS: ST completed PO trials of ice chips, thin liquids, and puree this date. Patient with evidence of decreased bolus acceptance s/t decreased labial strength for adequate extraction of po trials from spoon. Suspected adequate bolus control and manipulation, timely/coordinated mastication with effective textural breakdown of ice chips, and suspected adequate bolus formation resulting in complete bolus clearance. Concerns for decreased airway protection and/or pharyngeal residue given presence of intermittent throat clear during po trials this date. Of course pharyngeal dysfunction and totality of airway invasion events cannot be formally assessed without presence of instrumental evaluation; therefore, at this time, it is recommended patient complete MBS to determine safest level of po intake.      SHORT TERM GOAL #2:  Goal 2: Staff will complete comprehensive oral care to maximize oral integrity and to reduce potential bacteria colonization. INTERVENTIONS: Did not address this session d/t focus on other goals. Oral cavity appeared pink and moist.     SHORT TERM GOAL #3:  Goal 3: Patient will complete ST/cognitive evaluation when clinically indicated. INTERVENTIONS: Did not address this session d/t focus on other goals. Long-Term Goals:  No long term goals recommended d/t short ELOS          EDUCATION:  Learner: Patient  Education:  Reviewed results and recommendations of this evaluation, Reviewed diet and strategies, Reviewed signs, symptoms and risks of aspiration, Reviewed ST goals and Plan of Care, Reviewed recommendations for follow-up, Education Related to Potential Risks and Complications Due to Impairment/Illness/Injury, Education Related to Prevention of Recurrence of Impairment/Illness/Injury, and Education Related to [employer] and Wellness  Evaluation of Education: Needs further instruction, No evidence of learning, and Family not present    ASSESSMENT/PLAN:  Activity Tolerance:  Patient tolerance of  treatment: fair. Assessment/Plan: Patient progressing toward established goals. Continues to require skilled care of licensed speech pathologist to progress toward achievement of established goals and plan of care. .     Plan for Next Session: MBS  Discharge Recommendations: [facility]) [address]
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
## 2022-11-01 NOTE — FLOWSHEET NOTE
11/01/22 0953   Safe Environment   Safety Measures Other (comment)  (Virtual nurse rounding complete)   Per review of patients chart, patient with history of altered mental status and agitation. Visual check completed to check patient safety. Patient in bed, awake. No acute distress noted.

## 2022-11-01 NOTE — PROGRESS NOTES
[facility] STEPDOWN TELEMETRY 4K  EVALUATION    Time:    Time In: 827  Time Out: 1293  Timed Code Treatment Minutes: 12 Minutes  Minutes: 27          Date: 2022  Patient Name: Elissa Plaza,   Gender: male      MRN: [id number]  : 1955  (79 y.o.)  Referring Practitioner: Lavon Mroa MD  Diagnosis: fall at home  Additional Pertinent Hx: Per ER note on 10/29/2022:67 y.o. male who presents to the Emergency Department for the evaluation of altered mentation. Patient arrives with family who provides much history. He reportedly has a longstanding history of alcoholism. Was admitted at [facility] 1 month ago for GI bleed where he had upper and lower endoscopies. Family reports that when waking from anesthesia for the endoscopies, he had altered mentation and this has persisted since. They were told they believed he had a CVA during the procedure. He was discharged to an [facility] for therapy/rehab due to some residual left-sided weakness that were sequelae from skull fracture and subarachnoid hemorrhage 1 year ago. They report 5 or 6 falls since he went to the ECF, the most recent of which occurred this morning. He has been ambulating independently without use of any assistive devices but was too weak to assist himself and getting up today, prompting his ED arrival. Pt was intubated from 10/29 to 10/30. MRI of brain was negative for acute findings.     Restrictions/Precautions:  Restrictions/Precautions: Fall Risk  Position Activity Restriction  Other position/activity restrictions: confusion, high fall risk    Subjective  Chart Reviewed: Yes, Orders, Progress Notes, History and Physical  Patient assessed for rehabilitation services?: Yes  Family / Caregiver Present: No    Subjective: Pt very restless in bed upon arrival of therapist. Pt trying to get OOB unassisted    Pain: 0/10: no c/o pain during session    Vitals: Vitals not assessed per clinical judgement, see nursing flowsheet    Social/Functional History:  Type of Home: Facility ([facility]  Home Layout: One level           ADL Assistance: Needs assistance  Ambulation Assistance: Needs assistance  Transfer Assistance: Needs assistance          Additional Comments: Pt from [facility] prior to this admission. Per chart Pt was at a [facility] prior to admission to [facility]. Pt unable to give PLOF info d/t significant confusion. VISION:WFL    HEARING:  WFL    COGNITION: Decreased Recall, Decreased Insight, Impaired Memory, Inattention, Decreased Problem Solving, Decreased Safety Awareness, Difficulty Following Commands, and Impulsive  **Pt was disoriented to place & situation, responds to name-at times appeared to be hallucinating    RANGE OF MOTION:  Bilateral Upper Extremity:  WFL    STRENGTH:  Bilateral Upper Extremity:  WFL     SENSATION:   WFL    ADL:   Grooming: Dependent. Unable to get Pt to initiate combing hair  Bathing: Maximum Assistance. Sponge bath completed while seated EOB, max vcs to initiate, Pt unable to sequence BADL  Upper Extremity Dressing: Dependent. For [facility] gown  Lower Extremity Dressing: Dependent. For donning slipper socks, max A for donning undergarment (Pt able to A with pulling up in standing)  Toileting: Dependent. For placement of urinal (Pt reported he needed to go to the bathroom upon sitting EOB) . BALANCE:  Sitting Balance:  Contact Guard Assistance. Very impulsive with weight shift, reaching to floor to try to pick things up, & trying to stand  Standing Balance: Minimal Assistance. Posterior lean noted, poor safety awareness    BED MOBILITY:  Supine to Sit: Contact Guard Assistance    Sit to Supine: Moderate Assistance      TRANSFERS:  Sit to Stand:  Minimal Assistance. From EOB; stood x 3 times during session  Stand to Sit: Minimal Assistance.  For safety with sitting back onto bed    FUNCTIONAL MOBILITY:  Assistive Device: Rolling Walker  Assist Level: Moderate Assistance. Distance:  for side steps to [facility]  Poor safety awareness, close SBA of another for safety     Activity Tolerance:  Patient tolerance of  treatment: fair. Assessment:  Assessment: Pt demo decreased balance, cognition, & safety awareness for ADLs & functional mobility at indep level. Continued OT recommended to facilate improved indep & safety with returning to ADL tasks within discharge environment. Performance deficits / Impairments: Decreased functional mobility , Decreased ADL status, Decreased safe awareness, Decreased balance, Decreased cognition  Prognosis: Fair  REQUIRES OT FOLLOW-UP: Yes  Decision Making: Medium Complexity    Treatment Initiated: Treatment and education initiated within context of evaluation. Evaluation time included review of current medical information, gathering information related to past medical, social and functional history, completion of standardized testing, formal and informal observation of tasks, assessment of data and development of plan of care and goals. Treatment time included skilled education and facilitation of tasks to increase safety and independence with ADL's for improved functional independence and quality of life. Discharge Recommendations:  Subacute/Skilled Nursing Facility    Patient Education:     Patient Education  Education Given To: Patient  Education Provided: Role of Therapy, Plan of Care  Education Method: Demonstration, Verbal  Barriers to Learning: Cognition  Education Outcome: Continued education needed    Equipment Recommendations: Other: Monitor pending progress    Plan:  Times Per Week: 3-5x  Current Treatment Recommendations: Balance training, Functional mobility training, Safety education & training, Endurance training, Self-Care / ADL. See long-term goal time frame for expected duration of plan of care.   If no long-term goals established, a short length of stay is anticipated. Goals:  Patient goals : Pt did not state  Short Term Goals  Time Frame for Short Term Goals: until discharge  Short Term Goal 1: Pt will complete 1 min standing with min A x 1 for increased ease of toileting routine  Short Term Goal 2: Pt will complete sit-stand t/f with CGA & min vcs for UE placement & technique  Short Term Goal 3: Pt will complete mobility to bedside chair or BSC with RW, min A, & min vcs for safety  Short Term Goal 4: Pt will sequence grooming tasks with min vcs  Long Term Goals  Time Frame for Long Term Goals : No LTG set d/t short ELOS         Following session, patient left in safe position with all fall risk precautions in place.

## 2022-11-01 NOTE — FLOWSHEET NOTE
11/01/22 1749   Safe Environment   Safety Measures Other (comment)  (Virtual nurse rounding complete)   Patient sitting up on bedside. Visitor at bedside. Dinner tray on bedside table. No needs identified at this time.

## 2022-11-01 NOTE — PLAN OF CARE
refocusing and direction   Decrease environmental stimuli, including noise as appropriate   Monitor and intervene to maintain adequate nutrition, hydration, elimination, sleep and activity   If unable to ensure safety without constant attention obtain sitter and review sitter guidelines with assigned personnel   Initiate Psychosocial Clinical Nurse Specialist and Spiritual Care consult, as indicated     Problem: Skin/Tissue Integrity  Goal: Absence of new skin breakdown  Description: 1. Monitor for areas of redness and/or skin breakdown  2. Assess vascular access sites hourly  3. Every 4-6 hours minimum:  Change oxygen saturation probe site  4. Every 4-6 hours:  If on nasal continuous positive airway pressure, respiratory therapy assess nares and determine need for appliance change or resting period. Outcome: Progressing  Note: No new skin lesions noted this shift. Patient encouraged to reposition every two hours. Skin assessments completed and ongoing.         Problem: ABCDS Injury Assessment  Goal: Absence of physical injury  Outcome: Progressing  Flowsheets (Taken 10/31/2022 2129)  Absence of Physical Injury: Implement safety measures based on patient assessment     Problem: Safety - Adult  Goal: Free from fall injury  Outcome: Progressing  Flowsheets (Taken 10/31/2022 2129)  Free From Fall Injury: Instruct family/caregiver on patient safety     Problem: Pain  Goal: Verbalizes/displays adequate comfort level or baseline comfort level  Outcome: Progressing  Flowsheets (Taken 10/31/2022 2129)  Verbalizes/displays adequate comfort level or baseline comfort level:   Encourage patient to monitor pain and request assistance   Assess pain using appropriate pain scale   Administer analgesics based on type and severity of pain and evaluate response   Implement non-pharmacological measures as appropriate and evaluate response   Notify Licensed Independent Practitioner if interventions unsuccessful or patient reports new pain     Problem: Safety - Medical Restraint  Goal: Remains free of injury from restraints (Restraint for Interference with Medical Device)  Description: INTERVENTIONS:  1. Determine that other, less restrictive measures have been tried or would not be effective before applying the restraint  2. Evaluate the patient's condition at the time of restraint application  3. Inform patient/family regarding the reason for restraint  4.  Q2H: Monitor safety, psychosocial status, comfort, nutrition and hydration  Outcome: Progressing

## 2022-11-02 ENCOUNTER — APPOINTMENT (OUTPATIENT)
Dept: GENERAL RADIOLOGY | Age: 67
DRG: 640 | End: 2022-11-02
Payer: MEDICARE

## 2022-11-02 LAB
ALBUMIN SERPL-MCNC: 3 G/DL (ref 3.5–5.1)
ALP BLD-CCNC: 142 U/L (ref 38–126)
ALT SERPL-CCNC: 35 U/L (ref 11–66)
ANION GAP SERPL CALCULATED.3IONS-SCNC: 14 MEQ/L (ref 8–16)
AST SERPL-CCNC: 65 U/L (ref 5–40)
BASE EXCESS MIXED: -4.2 MMOL/L (ref -2–3)
BASOPHILS # BLD: 0.1 %
BASOPHILS ABSOLUTE: 0 THOU/MM3 (ref 0–0.1)
BILIRUB SERPL-MCNC: 0.4 MG/DL (ref 0.3–1.2)
BUN BLDV-MCNC: 18 MG/DL (ref 7–22)
CALCIUM SERPL-MCNC: 8.8 MG/DL (ref 8.5–10.5)
CHLORIDE BLD-SCNC: 114 MEQ/L (ref 98–111)
CO2: 16 MEQ/L (ref 23–33)
COLLECTED BY:: ABNORMAL
CREAT SERPL-MCNC: 1.1 MG/DL (ref 0.4–1.2)
EOSINOPHIL # BLD: 0.7 %
EOSINOPHILS ABSOLUTE: 0 THOU/MM3 (ref 0–0.4)
ERYTHROCYTE [DISTWIDTH] IN BLOOD BY AUTOMATED COUNT: 16.3 % (ref 11.5–14.5)
ERYTHROCYTE [DISTWIDTH] IN BLOOD BY AUTOMATED COUNT: 52.5 FL (ref 35–45)
GFR SERPL CREATININE-BSD FRML MDRD: > 60 ML/MIN/1.73M2
GLUCOSE BLD-MCNC: 85 MG/DL (ref 70–108)
GRAM STAIN RESULT: ABNORMAL
HCO3, MIXED: 19 MMOL/L (ref 23–28)
HCT VFR BLD CALC: 29.1 % (ref 42–52)
HEMOGLOBIN: 8.9 GM/DL (ref 14–18)
IMMATURE GRANS (ABS): 0.04 THOU/MM3 (ref 0–0.07)
IMMATURE GRANULOCYTES: 0.6 %
LYME ANTIBODY CSF: 0.2 LIV
LYMPHOCYTES # BLD: 13.7 %
LYMPHOCYTES ABSOLUTE: 1 THOU/MM3 (ref 1–4.8)
MCH RBC QN AUTO: 27.1 PG (ref 26–33)
MCHC RBC AUTO-ENTMCNC: 30.6 GM/DL (ref 32.2–35.5)
MCV RBC AUTO: 88.4 FL (ref 80–94)
MONOCYTES # BLD: 6.6 %
MONOCYTES ABSOLUTE: 0.5 THOU/MM3 (ref 0.4–1.3)
NUCLEATED RED BLOOD CELLS: 0 /100 WBC
O2 SAT, MIXED: 25 %
ORGANISM: ABNORMAL
ORGANISM: ABNORMAL
PCO2, MIXED VENOUS: 29 MMHG (ref 41–51)
PH, MIXED: 7.43 (ref 7.31–7.41)
PLATELET # BLD: 75 THOU/MM3 (ref 130–400)
PMV BLD AUTO: 11.4 FL (ref 9.4–12.4)
PO2 MIXED: 17 MMHG (ref 25–40)
POTASSIUM SERPL-SCNC: 4.1 MEQ/L (ref 3.5–5.2)
RBC # BLD: 3.29 MILL/MM3 (ref 4.7–6.1)
RESPIRATORY CULTURE: ABNORMAL
SEG NEUTROPHILS: 78.3 %
SEGMENTED NEUTROPHILS ABSOLUTE COUNT: 5.5 THOU/MM3 (ref 1.8–7.7)
SODIUM BLD-SCNC: 143 MEQ/L (ref 135–145)
SODIUM BLD-SCNC: 144 MEQ/L (ref 135–145)
TOTAL PROTEIN: 6.5 G/DL (ref 6.1–8)
WBC # BLD: 7 THOU/MM3 (ref 4.8–10.8)

## 2022-11-02 PROCEDURE — 6360000002 HC RX W HCPCS

## 2022-11-02 PROCEDURE — 6370000000 HC RX 637 (ALT 250 FOR IP): Performed by: INTERNAL MEDICINE

## 2022-11-02 PROCEDURE — 6370000000 HC RX 637 (ALT 250 FOR IP): Performed by: PSYCHIATRY & NEUROLOGY

## 2022-11-02 PROCEDURE — 6360000002 HC RX W HCPCS: Performed by: NURSE PRACTITIONER

## 2022-11-02 PROCEDURE — 2580000003 HC RX 258: Performed by: STUDENT IN AN ORGANIZED HEALTH CARE EDUCATION/TRAINING PROGRAM

## 2022-11-02 PROCEDURE — 2500000003 HC RX 250 WO HCPCS: Performed by: PHYSICIAN ASSISTANT

## 2022-11-02 PROCEDURE — 84295 ASSAY OF SERUM SODIUM: CPT

## 2022-11-02 PROCEDURE — 85025 COMPLETE CBC W/AUTO DIFF WBC: CPT

## 2022-11-02 PROCEDURE — 6370000000 HC RX 637 (ALT 250 FOR IP): Performed by: STUDENT IN AN ORGANIZED HEALTH CARE EDUCATION/TRAINING PROGRAM

## 2022-11-02 PROCEDURE — 99232 SBSQ HOSP IP/OBS MODERATE 35: CPT | Performed by: INTERNAL MEDICINE

## 2022-11-02 PROCEDURE — 2580000003 HC RX 258

## 2022-11-02 PROCEDURE — 2580000003 HC RX 258: Performed by: NURSE PRACTITIONER

## 2022-11-02 PROCEDURE — 71045 X-RAY EXAM CHEST 1 VIEW: CPT

## 2022-11-02 PROCEDURE — 80053 COMPREHEN METABOLIC PANEL: CPT

## 2022-11-02 PROCEDURE — 2500000003 HC RX 250 WO HCPCS

## 2022-11-02 PROCEDURE — 6360000002 HC RX W HCPCS: Performed by: STUDENT IN AN ORGANIZED HEALTH CARE EDUCATION/TRAINING PROGRAM

## 2022-11-02 PROCEDURE — 2060000000 HC ICU INTERMEDIATE R&B

## 2022-11-02 PROCEDURE — 6370000000 HC RX 637 (ALT 250 FOR IP): Performed by: NURSE PRACTITIONER

## 2022-11-02 PROCEDURE — C9113 INJ PANTOPRAZOLE SODIUM, VIA: HCPCS

## 2022-11-02 PROCEDURE — 92526 ORAL FUNCTION THERAPY: CPT

## 2022-11-02 PROCEDURE — 99232 SBSQ HOSP IP/OBS MODERATE 35: CPT | Performed by: FAMILY MEDICINE

## 2022-11-02 PROCEDURE — 92523 SPEECH SOUND LANG COMPREHEN: CPT

## 2022-11-02 PROCEDURE — 36415 COLL VENOUS BLD VENIPUNCTURE: CPT

## 2022-11-02 PROCEDURE — 82803 BLOOD GASES ANY COMBINATION: CPT

## 2022-11-02 RX ORDER — MULTIVITAMIN WITH IRON
1 TABLET ORAL DAILY
Status: DISCONTINUED | OUTPATIENT
Start: 2022-11-02 | End: 2022-11-11 | Stop reason: HOSPADM

## 2022-11-02 RX ORDER — DIVALPROEX SODIUM 125 MG/1
250 CAPSULE, COATED PELLETS ORAL 3 TIMES DAILY
Status: DISCONTINUED | OUTPATIENT
Start: 2022-11-02 | End: 2022-11-02

## 2022-11-02 RX ORDER — GAUZE BANDAGE 2" X 2"
100 BANDAGE TOPICAL DAILY
Status: DISCONTINUED | OUTPATIENT
Start: 2022-11-02 | End: 2022-11-02 | Stop reason: ALTCHOICE

## 2022-11-02 RX ORDER — SODIUM BICARBONATE 650 MG/1
1300 TABLET ORAL 3 TIMES DAILY
Status: COMPLETED | OUTPATIENT
Start: 2022-11-02 | End: 2022-11-02

## 2022-11-02 RX ORDER — AMLODIPINE BESYLATE 10 MG/1
10 TABLET ORAL DAILY
Status: DISCONTINUED | OUTPATIENT
Start: 2022-11-02 | End: 2022-11-11 | Stop reason: HOSPADM

## 2022-11-02 RX ORDER — LANOLIN ALCOHOL/MO/W.PET/CERES
100 CREAM (GRAM) TOPICAL DAILY
Status: DISCONTINUED | OUTPATIENT
Start: 2022-11-03 | End: 2022-11-11 | Stop reason: HOSPADM

## 2022-11-02 RX ORDER — CARVEDILOL 6.25 MG/1
6.25 TABLET ORAL 2 TIMES DAILY
Status: DISCONTINUED | OUTPATIENT
Start: 2022-11-02 | End: 2022-11-11 | Stop reason: HOSPADM

## 2022-11-02 RX ORDER — RISPERIDONE 0.25 MG/1
0.5 TABLET ORAL 2 TIMES DAILY
Status: DISCONTINUED | OUTPATIENT
Start: 2022-11-02 | End: 2022-11-11 | Stop reason: HOSPADM

## 2022-11-02 RX ORDER — LORAZEPAM 0.5 MG/1
0.5 TABLET ORAL 3 TIMES DAILY PRN
Status: DISCONTINUED | OUTPATIENT
Start: 2022-11-02 | End: 2022-11-11 | Stop reason: HOSPADM

## 2022-11-02 RX ORDER — LANOLIN ALCOHOL/MO/W.PET/CERES
3 CREAM (GRAM) TOPICAL NIGHTLY
Status: DISCONTINUED | OUTPATIENT
Start: 2022-11-02 | End: 2022-11-11 | Stop reason: HOSPADM

## 2022-11-02 RX ORDER — MIRTAZAPINE 15 MG/1
15 TABLET, FILM COATED ORAL NIGHTLY
Status: DISCONTINUED | OUTPATIENT
Start: 2022-11-02 | End: 2022-11-11 | Stop reason: HOSPADM

## 2022-11-02 RX ORDER — HYDRALAZINE HYDROCHLORIDE 50 MG/1
50 TABLET, FILM COATED ORAL EVERY 8 HOURS
Status: DISCONTINUED | OUTPATIENT
Start: 2022-11-02 | End: 2022-11-10

## 2022-11-02 RX ORDER — NICOTINE 21 MG/24HR
1 PATCH, TRANSDERMAL 24 HOURS TRANSDERMAL EVERY 24 HOURS
Status: DISCONTINUED | OUTPATIENT
Start: 2022-11-02 | End: 2022-11-11 | Stop reason: HOSPADM

## 2022-11-02 RX ORDER — PANTOPRAZOLE SODIUM 40 MG/1
40 TABLET, DELAYED RELEASE ORAL 2 TIMES DAILY
Status: DISCONTINUED | OUTPATIENT
Start: 2022-11-02 | End: 2022-11-11 | Stop reason: HOSPADM

## 2022-11-02 RX ADMIN — RISPERIDONE 0.5 MG: 1 TABLET ORAL at 20:27

## 2022-11-02 RX ADMIN — HYDRALAZINE HYDROCHLORIDE 5 MG: 20 INJECTION INTRAMUSCULAR; INTRAVENOUS at 07:42

## 2022-11-02 RX ADMIN — SODIUM CHLORIDE, PRESERVATIVE FREE 10 ML: 5 INJECTION INTRAVENOUS at 20:28

## 2022-11-02 RX ADMIN — Medication 3 MG: at 20:27

## 2022-11-02 RX ADMIN — SODIUM CHLORIDE, PRESERVATIVE FREE 10 ML: 5 INJECTION INTRAVENOUS at 07:48

## 2022-11-02 RX ADMIN — SODIUM BICARBONATE 1300 MG: 650 TABLET ORAL at 12:26

## 2022-11-02 RX ADMIN — SODIUM BICARBONATE 1300 MG: 650 TABLET ORAL at 13:54

## 2022-11-02 RX ADMIN — FOLIC ACID 1 MG: 5 INJECTION, SOLUTION INTRAMUSCULAR; INTRAVENOUS; SUBCUTANEOUS at 07:52

## 2022-11-02 RX ADMIN — HYDRALAZINE HYDROCHLORIDE 5 MG: 20 INJECTION INTRAMUSCULAR; INTRAVENOUS at 13:54

## 2022-11-02 RX ADMIN — PANTOPRAZOLE SODIUM 40 MG: 40 TABLET, DELAYED RELEASE ORAL at 20:27

## 2022-11-02 RX ADMIN — MIRTAZAPINE 15 MG: 15 TABLET, FILM COATED ORAL at 20:27

## 2022-11-02 RX ADMIN — CEFTRIAXONE 2000 MG: 2 INJECTION, POWDER, FOR SOLUTION INTRAMUSCULAR; INTRAVENOUS at 00:18

## 2022-11-02 RX ADMIN — ENOXAPARIN SODIUM 40 MG: 100 INJECTION SUBCUTANEOUS at 07:55

## 2022-11-02 RX ADMIN — METOPROLOL TARTRATE 5 MG: 5 INJECTION, SOLUTION INTRAVENOUS at 10:27

## 2022-11-02 RX ADMIN — PANTOPRAZOLE SODIUM 40 MG: 40 INJECTION, POWDER, FOR SOLUTION INTRAVENOUS at 07:47

## 2022-11-02 RX ADMIN — ACETAMINOPHEN 650 MG: 325 TABLET ORAL at 23:45

## 2022-11-02 RX ADMIN — CARVEDILOL 6.25 MG: 6.25 TABLET, FILM COATED ORAL at 20:27

## 2022-11-02 RX ADMIN — SODIUM BICARBONATE 1300 MG: 650 TABLET ORAL at 20:27

## 2022-11-02 ASSESSMENT — PAIN DESCRIPTION - ORIENTATION: ORIENTATION: LOWER

## 2022-11-02 ASSESSMENT — PAIN DESCRIPTION - DESCRIPTORS: DESCRIPTORS: ACHING;DISCOMFORT;SORE

## 2022-11-02 ASSESSMENT — PAIN SCALES - WONG BAKER: WONGBAKER_NUMERICALRESPONSE: 0

## 2022-11-02 ASSESSMENT — PAIN - FUNCTIONAL ASSESSMENT: PAIN_FUNCTIONAL_ASSESSMENT: ACTIVITIES ARE NOT PREVENTED

## 2022-11-02 ASSESSMENT — PAIN DESCRIPTION - LOCATION: LOCATION: BACK

## 2022-11-02 ASSESSMENT — PAIN SCALES - GENERAL: PAINLEVEL_OUTOF10: 7
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
## 2022-11-02 NOTE — CARE COORDINATION
11/2/22, 1:32 PM EDT    DISCHARGE ON GOING [facility] Hospital day: 4  Location: [facility] Reason for admit: Fall at home, initial encounter [W19. XXXA, Y92.009]  Altered mental status [R41.82]   Procedure: 10/29 Intubated  10/30 MRI Brain: No acute findings; Areas of volume loss and abnormal signal in the inferior right frontal lobe and the right parietal lobe. Both these areas have associated prior hemorrhage. These could be posttraumatic injuries versus sites of old infarct. These do not appear acute; Mild severity chronic small vessel ischemic changes  10/30 EEG: No seizures noted; suggests moderate encelphaopathy  10/30 LP: Negative  10/30 Extubated  10/31 CXR: Interval removal of the endotracheal tube. Borderline cardiomegaly. Slight increased density at both lung bases. Atherosclerotic calcification in the aortic arch. 10/31 EEG: This EEG was abnormal due to disorganized and slow background in polymorphic delta and theta frequency. Superimposed fast beta activity was seen over the right frontocentral leads. 11/1 MBS: minced and moist with thin liquids. Barriers to Discharge: Hospitalist, Nephrology, Psychiatry, and therapy following. Respiratory culture growing staphylococcus aureus & e coli. Vitals:    11/02/22 1122   BP: (!) 154/69   Pulse: (!) 102   Resp:    Temp: 99.2 °F (37.3 °C)   SpO2: 96%   On room air. 11/1/22 03:37 11/1/22 18:23 11/1/22 23:45 11/2/22 06:41   Sodium 146 (H) 143 143 144     Receiving: D5 at 30 ml/hr, IV Rocephin, IV Folic acid, Bicarb tabs, PRN IV Lopressor, PRN IV Hydralzine. POA refusing for patient to receiving Depakote and Risperdal, both newly ordered medications from Psych. PCP: Elva Holman  Readmission Risk Score: 15.9%  Patient Goals/Plan/Treatment Preferences: From [facility]. SW following to assist with discharge planning.

## 2022-11-02 NOTE — FLOWSHEET NOTE
11/02/22 1719   Safe Environment   Safety Measures Other (comment)  (staff attending to pt needs at this time .)

## 2022-11-02 NOTE — CARE COORDINATION
11/2/22, 3:15 PM EDT    DISCHARGE PLANNING EVALUATION      Spoke with sister, Veronika Huerta, who called with concerns about discharge plan. She would like to know if [facility] will take back. ECF plans to evaluate closer to discharge. Family is refusing any [facility] placement because of a poor experience at a previous placement. They also do not want him to have any antipsychotic medications. Discussed that ecfs will want to be sure that they can manage any behaviors safely before accepting. Sister states that she has called [facility] but has not heard back from that facility. SW confirmed that [facility] has shared they do not have any open beds at this time. Will need decision from [facility], closer to discharge.

## 2022-11-02 NOTE — PLAN OF CARE
Problem: Discharge Planning  Goal: Discharge to home or other facility with appropriate resources  Outcome: Progressing  Flowsheets (Taken 11/2/2022 0730)  Discharge to home or other facility with appropriate resources:   Identify barriers to discharge with patient and caregiver   Arrange for needed discharge resources and transportation as appropriate   Identify discharge learning needs (meds, wound care, etc)   Refer to discharge planning if patient needs post-hospital services based on physician order or complex needs related to functional status, cognitive ability or social support system     Problem: Confusion  Goal: Confusion, delirium, dementia, or psychosis is improved or at baseline  Description: INTERVENTIONS:  1. Assess for possible contributors to thought disturbance, including medications, impaired vision or hearing, underlying metabolic abnormalities, dehydration, psychiatric diagnoses, and notify attending LIP  2. [place] high risk fall precautions, as indicated  3. Provide frequent short contacts to provide reality reorientation, refocusing and direction  4. Decrease environmental stimuli, including noise as appropriate  5. Monitor and intervene to maintain adequate nutrition, hydration, elimination, sleep and activity  6. If unable to ensure safety without constant attention obtain sitter and review sitter guidelines with assigned personnel  7.  Initiate Psychosocial CNS and Spiritual Care consult, as indicated  Outcome: Progressing  Flowsheets (Taken 11/2/2022 0730)  Effect of thought disturbance (confusion, delirium, dementia, or psychosis) are managed with adequate functional status:   Assess for contributors to thought disturbance, including medications, impaired vision or hearing, underlying metabolic abnormalities, dehydration, psychiatric diagnoses, notify [facility] high risk fall precautions, as indicated     Problem: Skin/Tissue Integrity  Goal: Absence of new skin breakdown  Description: 1. Monitor for areas of redness and/or skin breakdown  2. Assess vascular access sites hourly  3. Every 4-6 hours minimum:  Change oxygen saturation probe site  4. Every 4-6 hours:  If on nasal continuous positive airway pressure, respiratory therapy assess nares and determine need for appliance change or resting period. Outcome: Progressing  Note: Q2 turns      Problem: ABCDS Injury Assessment  Goal: Absence of physical injury  Outcome: Progressing  Absence of Physical Injury: Implement safety measures based on patient assessment     Problem: Safety - Adult  Goal: Free from fall injury  Outcome: Progressing  Free From Fall Injury: Instruct family/caregiver on patient safety     Problem: Pain  Goal: Verbalizes/displays adequate comfort level or baseline comfort level  Outcome: Progressing  Verbalizes/displays adequate comfort level or baseline comfort level:   Encourage patient to monitor pain and request assistance   Assess pain using appropriate pain scale   Administer analgesics based on type and severity of pain and evaluate response   Implement non-pharmacological measures as appropriate and evaluate response   Notify Licensed Independent Practitioner if interventions unsuccessful or patient reports new pain   Care plan reviewed with patient. Patient verbalizes understanding of the plan of care and contributes to goal setting.

## 2022-11-02 NOTE — CONSULTS
Attempt to interview patient, will try to contact family for more info  Meds and chart reviewed  Orders written  Yes  Full consult will be dictated  Thanks for the consult.

## 2022-11-02 NOTE — PROGRESS NOTES
[facility]   Nephrology progress note  11/2/2022, 10:20 AM      Pt Name:    Arely Martinez  MRN:     [id number]     YOB: 1955  Admit Date:    10/29/2022  7:54 AM    Chief Complaint: Nephrology following for hypernatremia, EHNRY. Subjective:  Patient was seen and examined this morning. No new events. Tech at bedside. Pt still c/o thirst.  Sodium better. Urine output noted. Objective:  24HR INTAKE/OUTPUT:    Intake/Output Summary (Last 24 hours) at 11/2/2022 1020  Last data filed at 11/2/2022 0844  Gross per 24 hour   Intake 660 ml   Output 1811 ml   Net -1151 ml         I/O last 3 completed shifts:   In: 660 [P.O.:660]  Out: 3386 [EAHCA:3445]  I/O this shift:  In: -   Out: 200 [Urine:200]   Admission weight: 189 lb 12.8 oz (86.1 kg)  Wt Readings from Last 3 Encounters:   11/01/22 188 lb 8 oz (85.5 kg)   01/19/22 184 lb (83.5 kg)   01/16/22 180 lb (81.6 kg)        Vitals :   Vitals:    11/01/22 2350 11/02/22 0343 11/02/22 0728 11/02/22 0844   BP: (!) 141/101 108/73 (!) 174/80 133/70   Pulse: 94 97 (!) 122 (!) 107   Resp: 19 15 18    Temp: 98.1 °F (36.7 °C) 99.2 °F (37.3 °C) 98.1 °F (36.7 °C)    TempSrc: Oral Axillary Oral    SpO2: 99% 100% 97%    Weight:       Height:           Physical examination  General Appearance: awake, no distress, confused  Mouth/Throat: Oral mucosa moist  Neck: No JVD  Lungs: Air entry B/L, no rales, no use of accessory muscles  Heart:  S1, S2 heard  GI: soft, non-tender, no guarding  Extremities: no LE edema    Medications:  Infusion:    dextrose 50 mL/hr at 11/01/22 1724    sodium chloride       Meds:    sodium bicarbonate  1,300 mg Oral TID    cefTRIAXone (ROCEPHIN) IV  2,000 mg IntraVENous Q24H    [Held by provider] propranolol  20 mg Oral BID    pantoprazole  40 mg IntraVENous Daily    folic acid IVPB  1 mg IntraVENous Daily    sodium chloride flush  5-40 mL IntraVENous 2 times per day    enoxaparin  40 mg SubCUTAneous Daily     Meds prn: potassium chloride **OR** potassium alternative oral replacement **OR** potassium chloride, hydrALAZINE, metoprolol, sodium chloride flush, sodium chloride, ondansetron **OR** ondansetron, polyethylene glycol, acetaminophen **OR** acetaminophen     Lab Data :  CBC:   Recent Labs     10/31/22  0419 11/01/22  0337 11/02/22  0641   WBC 6.9 4.7* 7.0   HGB 8.1* 8.0* 8.9*   HCT 26.9* 26.4* 29.1*   PLT 65* 66* 75*     CMP:  Recent Labs     10/31/22  0419 10/31/22  1317 11/01/22 0337 11/01/22  1823 11/01/22  2345 11/02/22  0641   *  --  146* 143 143 144   K 3.2* 3.1* 4.0  --   --  4.1   *  --  118*  --   --  114*   CO2 18*  --  17*  --   --  16*   BUN 30*  --  23*  --   --  18   CREATININE 1.7*  --  1.2  --   --  1.1   GLUCOSE 90  --  107  --   --  85   CALCIUM 7.8*  --  8.2*  --   --  8.8     Hepatic:   Recent Labs     10/31/22  0419 11/01/22  0337 11/02/22  0641   LABALBU 2.4* 2.5* 3.0*   AST 49* 48* 65*   ALT 26 28 35   BILITOT 0.4 0.4 0.4   ALKPHOS 117 125 142*         Assessment and Plan:  Hypernatremia from volume depletion. Does not appear to be diabetes insipidus as his urine osmolality is high and does not appear to have polyuria.  sodium better. Reduce D5W to 30 ml/hour, encourage increased po fluid intake  HENRY from volume depletion, improving  Hyperchloremic acidosis. Bicarb still 16, check blood gas. Po bicarb x 3 doses  Altered mental status  Pancytopenia  Pneumonia  Hx alcohol use      D/W patient     Trino Mark, DO  [facility]    This report has been created using voice recognition software.  It may contain minor errors which are inherent in voice recognition technology
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
## 2022-11-03 ENCOUNTER — APPOINTMENT (OUTPATIENT)
Dept: GENERAL RADIOLOGY | Age: 67
DRG: 640 | End: 2022-11-03
Payer: MEDICARE

## 2022-11-03 LAB
ALBUMIN SERPL-MCNC: 2.8 G/DL (ref 3.5–5.1)
ALP BLD-CCNC: 133 U/L (ref 38–126)
ALT SERPL-CCNC: 36 U/L (ref 11–66)
ANION GAP SERPL CALCULATED.3IONS-SCNC: 13 MEQ/L (ref 8–16)
ANION GAP SERPL CALCULATED.3IONS-SCNC: 15 MEQ/L (ref 8–16)
AST SERPL-CCNC: 58 U/L (ref 5–40)
BASOPHILS # BLD: 0.2 %
BASOPHILS ABSOLUTE: 0 THOU/MM3 (ref 0–0.1)
BILIRUB SERPL-MCNC: 0.5 MG/DL (ref 0.3–1.2)
BUN BLDV-MCNC: 15 MG/DL (ref 7–22)
BUN BLDV-MCNC: 15 MG/DL (ref 7–22)
CALCIUM IONIZED: 1.13 MMOL/L (ref 1.12–1.32)
CALCIUM SERPL-MCNC: 8.2 MG/DL (ref 8.5–10.5)
CALCIUM SERPL-MCNC: 8.4 MG/DL (ref 8.5–10.5)
CHLORIDE BLD-SCNC: 107 MEQ/L (ref 98–111)
CHLORIDE BLD-SCNC: 108 MEQ/L (ref 98–111)
CO2: 17 MEQ/L (ref 23–33)
CO2: 17 MEQ/L (ref 23–33)
CREAT SERPL-MCNC: 1.2 MG/DL (ref 0.4–1.2)
CREAT SERPL-MCNC: 1.4 MG/DL (ref 0.4–1.2)
EKG ATRIAL RATE: 85 BPM
EKG ATRIAL RATE: 87 BPM
EKG P AXIS: 68 DEGREES
EKG P-R INTERVAL: 168 MS
EKG P-R INTERVAL: 204 MS
EKG Q-T INTERVAL: 390 MS
EKG Q-T INTERVAL: 394 MS
EKG QRS DURATION: 92 MS
EKG QRS DURATION: 98 MS
EKG QTC CALCULATION (BAZETT): 464 MS
EKG QTC CALCULATION (BAZETT): 474 MS
EKG R AXIS: 143 DEGREES
EKG R AXIS: 63 DEGREES
EKG T AXIS: 141 DEGREES
EKG T AXIS: 64 DEGREES
EKG VENTRICULAR RATE: 85 BPM
EKG VENTRICULAR RATE: 87 BPM
EOSINOPHIL # BLD: 1.1 %
EOSINOPHILS ABSOLUTE: 0.1 THOU/MM3 (ref 0–0.4)
EPSTEIN BARR VIRUS BY PCR (BLOOD): NORMAL
ERYTHROCYTE [DISTWIDTH] IN BLOOD BY AUTOMATED COUNT: 16.3 % (ref 11.5–14.5)
ERYTHROCYTE [DISTWIDTH] IN BLOOD BY AUTOMATED COUNT: 51.8 FL (ref 35–45)
GFR SERPL CREATININE-BSD FRML MDRD: 55 ML/MIN/1.73M2
GFR SERPL CREATININE-BSD FRML MDRD: > 60 ML/MIN/1.73M2
GLUCOSE BLD-MCNC: 113 MG/DL (ref 70–108)
GLUCOSE BLD-MCNC: 119 MG/DL (ref 70–108)
GLUCOSE BLD-MCNC: 99 MG/DL (ref 70–108)
HCT VFR BLD CALC: 27.6 % (ref 42–52)
HEMOGLOBIN: 8.6 GM/DL (ref 14–18)
IMMATURE GRANS (ABS): 0.02 THOU/MM3 (ref 0–0.07)
IMMATURE GRANULOCYTES: 0.3 %
LYMPHOCYTES # BLD: 15.4 %
LYMPHOCYTES ABSOLUTE: 1 THOU/MM3 (ref 1–4.8)
MCH RBC QN AUTO: 27.3 PG (ref 26–33)
MCHC RBC AUTO-ENTMCNC: 31.2 GM/DL (ref 32.2–35.5)
MCV RBC AUTO: 87.6 FL (ref 80–94)
MONOCYTES # BLD: 9.9 %
MONOCYTES ABSOLUTE: 0.6 THOU/MM3 (ref 0.4–1.3)
NUCLEATED RED BLOOD CELLS: 0 /100 WBC
PLATELET # BLD: 63 THOU/MM3 (ref 130–400)
PMV BLD AUTO: 10.7 FL (ref 9.4–12.4)
POTASSIUM SERPL-SCNC: 3 MEQ/L (ref 3.5–5.2)
POTASSIUM SERPL-SCNC: 4 MEQ/L (ref 3.5–5.2)
RBC # BLD: 3.15 MILL/MM3 (ref 4.7–6.1)
SEG NEUTROPHILS: 73.1 %
SEGMENTED NEUTROPHILS ABSOLUTE COUNT: 4.5 THOU/MM3 (ref 1.8–7.7)
SODIUM BLD-SCNC: 137 MEQ/L (ref 135–145)
SODIUM BLD-SCNC: 140 MEQ/L (ref 135–145)
SODIUM BLD-SCNC: 141 MEQ/L (ref 135–145)
TOTAL PROTEIN: 6 G/DL (ref 6.1–8)
WBC # BLD: 6.2 THOU/MM3 (ref 4.8–10.8)

## 2022-11-03 PROCEDURE — 71045 X-RAY EXAM CHEST 1 VIEW: CPT

## 2022-11-03 PROCEDURE — 82948 REAGENT STRIP/BLOOD GLUCOSE: CPT

## 2022-11-03 PROCEDURE — 6360000002 HC RX W HCPCS: Performed by: STUDENT IN AN ORGANIZED HEALTH CARE EDUCATION/TRAINING PROGRAM

## 2022-11-03 PROCEDURE — 99232 SBSQ HOSP IP/OBS MODERATE 35: CPT | Performed by: FAMILY MEDICINE

## 2022-11-03 PROCEDURE — 6360000002 HC RX W HCPCS: Performed by: PHYSICIAN ASSISTANT

## 2022-11-03 PROCEDURE — 97530 THERAPEUTIC ACTIVITIES: CPT

## 2022-11-03 PROCEDURE — 2580000003 HC RX 258: Performed by: STUDENT IN AN ORGANIZED HEALTH CARE EDUCATION/TRAINING PROGRAM

## 2022-11-03 PROCEDURE — 6370000000 HC RX 637 (ALT 250 FOR IP): Performed by: PSYCHIATRY & NEUROLOGY

## 2022-11-03 PROCEDURE — 93010 ELECTROCARDIOGRAM REPORT: CPT | Performed by: INTERNAL MEDICINE

## 2022-11-03 PROCEDURE — 93005 ELECTROCARDIOGRAM TRACING: CPT | Performed by: PSYCHIATRY & NEUROLOGY

## 2022-11-03 PROCEDURE — 97116 GAIT TRAINING THERAPY: CPT

## 2022-11-03 PROCEDURE — 80053 COMPREHEN METABOLIC PANEL: CPT

## 2022-11-03 PROCEDURE — 82330 ASSAY OF CALCIUM: CPT

## 2022-11-03 PROCEDURE — 2060000000 HC ICU INTERMEDIATE R&B

## 2022-11-03 PROCEDURE — 6370000000 HC RX 637 (ALT 250 FOR IP): Performed by: STUDENT IN AN ORGANIZED HEALTH CARE EDUCATION/TRAINING PROGRAM

## 2022-11-03 PROCEDURE — 84295 ASSAY OF SERUM SODIUM: CPT

## 2022-11-03 PROCEDURE — 2500000003 HC RX 250 WO HCPCS

## 2022-11-03 PROCEDURE — 2580000003 HC RX 258: Performed by: FAMILY MEDICINE

## 2022-11-03 PROCEDURE — 36415 COLL VENOUS BLD VENIPUNCTURE: CPT

## 2022-11-03 PROCEDURE — 97535 SELF CARE MNGMENT TRAINING: CPT

## 2022-11-03 PROCEDURE — 2580000003 HC RX 258: Performed by: NURSE PRACTITIONER

## 2022-11-03 PROCEDURE — 93005 ELECTROCARDIOGRAM TRACING: CPT | Performed by: FAMILY MEDICINE

## 2022-11-03 PROCEDURE — 2580000003 HC RX 258

## 2022-11-03 PROCEDURE — 85025 COMPLETE CBC W/AUTO DIFF WBC: CPT

## 2022-11-03 PROCEDURE — 99232 SBSQ HOSP IP/OBS MODERATE 35: CPT | Performed by: INTERNAL MEDICINE

## 2022-11-03 RX ORDER — POTASSIUM CHLORIDE 20 MEQ/1
20 TABLET, EXTENDED RELEASE ORAL
Status: DISCONTINUED | OUTPATIENT
Start: 2022-11-03 | End: 2022-11-04

## 2022-11-03 RX ORDER — 0.9 % SODIUM CHLORIDE 0.9 %
1000 INTRAVENOUS SOLUTION INTRAVENOUS ONCE
Status: COMPLETED | OUTPATIENT
Start: 2022-11-03 | End: 2022-11-03

## 2022-11-03 RX ORDER — HALOPERIDOL 5 MG/ML
5 INJECTION INTRAMUSCULAR EVERY 6 HOURS PRN
Status: DISCONTINUED | OUTPATIENT
Start: 2022-11-03 | End: 2022-11-11 | Stop reason: HOSPADM

## 2022-11-03 RX ORDER — 0.9 % SODIUM CHLORIDE 0.9 %
1000 INTRAVENOUS SOLUTION INTRAVENOUS ONCE
Status: DISCONTINUED | OUTPATIENT
Start: 2022-11-03 | End: 2022-11-03

## 2022-11-03 RX ADMIN — POTASSIUM CHLORIDE 10 MEQ: 7.46 INJECTION, SOLUTION INTRAVENOUS at 13:43

## 2022-11-03 RX ADMIN — SODIUM CHLORIDE 1000 ML: 9 INJECTION, SOLUTION INTRAVENOUS at 13:00

## 2022-11-03 RX ADMIN — POTASSIUM CHLORIDE 10 MEQ: 7.46 INJECTION, SOLUTION INTRAVENOUS at 10:44

## 2022-11-03 RX ADMIN — PANTOPRAZOLE SODIUM 40 MG: 40 TABLET, DELAYED RELEASE ORAL at 19:58

## 2022-11-03 RX ADMIN — HALOPERIDOL LACTATE 5 MG: 5 INJECTION, SOLUTION INTRAMUSCULAR at 03:33

## 2022-11-03 RX ADMIN — CARVEDILOL 6.25 MG: 6.25 TABLET, FILM COATED ORAL at 19:58

## 2022-11-03 RX ADMIN — AMLODIPINE BESYLATE 10 MG: 10 TABLET ORAL at 07:45

## 2022-11-03 RX ADMIN — POTASSIUM CHLORIDE 10 MEQ: 7.46 INJECTION, SOLUTION INTRAVENOUS at 08:23

## 2022-11-03 RX ADMIN — RISPERIDONE 0.5 MG: 1 TABLET ORAL at 19:58

## 2022-11-03 RX ADMIN — POTASSIUM CHLORIDE 10 MEQ: 7.46 INJECTION, SOLUTION INTRAVENOUS at 06:48

## 2022-11-03 RX ADMIN — CEFTRIAXONE 2000 MG: 2 INJECTION, POWDER, FOR SOLUTION INTRAMUSCULAR; INTRAVENOUS at 01:34

## 2022-11-03 RX ADMIN — LORAZEPAM 0.5 MG: 0.5 TABLET ORAL at 11:07

## 2022-11-03 RX ADMIN — HALOPERIDOL LACTATE 5 MG: 5 INJECTION, SOLUTION INTRAMUSCULAR at 22:59

## 2022-11-03 RX ADMIN — PANTOPRAZOLE SODIUM 40 MG: 40 TABLET, DELAYED RELEASE ORAL at 07:46

## 2022-11-03 RX ADMIN — FOLIC ACID 1 MG: 5 INJECTION, SOLUTION INTRAMUSCULAR; INTRAVENOUS; SUBCUTANEOUS at 09:38

## 2022-11-03 RX ADMIN — POTASSIUM CHLORIDE 10 MEQ: 7.46 INJECTION, SOLUTION INTRAVENOUS at 05:54

## 2022-11-03 RX ADMIN — CARVEDILOL 6.25 MG: 6.25 TABLET, FILM COATED ORAL at 07:45

## 2022-11-03 RX ADMIN — Medication 3 MG: at 19:58

## 2022-11-03 RX ADMIN — Medication 100 MG: at 07:46

## 2022-11-03 RX ADMIN — SODIUM CHLORIDE, PRESERVATIVE FREE 10 ML: 5 INJECTION INTRAVENOUS at 19:58

## 2022-11-03 RX ADMIN — POTASSIUM CHLORIDE 10 MEQ: 7.46 INJECTION, SOLUTION INTRAVENOUS at 09:33

## 2022-11-03 RX ADMIN — MIRTAZAPINE 15 MG: 15 TABLET, FILM COATED ORAL at 19:58

## 2022-11-03 RX ADMIN — LORAZEPAM 0.5 MG: 0.5 TABLET ORAL at 03:00

## 2022-11-03 ASSESSMENT — PAIN SCALES - GENERAL
PAINLEVEL_OUTOF10: 0
PAINLEVEL_OUTOF10: 0

## 2022-11-03 NOTE — PROGRESS NOTES
Daily Progress Note  Marvel Hahn MD  11/3/2022    Reviewed patient's current plan of care and vital signs with nursing staff. Per sitter patient barely slept last night. He was restless this morning but has been sleeping for the past few hours. SUBJECTIVE:    Patient is unchanged. No suicidal ideation. ROS: Patient has new complaints:  No  Sleeping adequately:  No      Mental Status Examination:  Patient is unarousable. Speech: Mumbled. No abnormal movements, tics or mannerisms. Mood dysthymic; affect restricted. Suicidal ideation Absent. Homicidal ideations Absent. Hallucinations Absent. Delusions Absent. Thought Content: abnormal. Thought Processes: Evasive. Alert and oriented X 0. Attention and concentration impaired. MEMORY tested. Insight and Judgement impaired judgment.       Medications  Current Facility-Administered Medications: haloperidol lactate (HALDOL) injection 5 mg, 5 mg, IntraMUSCular, Q6H PRN  potassium chloride (KLOR-CON M) extended release tablet 20 mEq, 20 mEq, Oral, Daily with breakfast  mirtazapine (REMERON) tablet 15 mg, 15 mg, Oral, Nightly  risperiDONE (RISPERDAL) tablet 0.5 mg, 0.5 mg, Oral, BID  multivitamin 1 tablet, 1 tablet, Oral, Daily  nicotine (NICODERM CQ) 21 MG/24HR 1 patch, 1 patch, TransDERmal, Q24H  pantoprazole (PROTONIX) tablet 40 mg, 40 mg, Oral, BID  LORazepam (ATIVAN) tablet 0.5 mg, 0.5 mg, Oral, TID PRN  amLODIPine (NORVASC) tablet 10 mg, 10 mg, Oral, Daily  carvedilol (COREG) tablet 6.25 mg, 6.25 mg, Oral, BID  [Held by provider] hydrALAZINE (APRESOLINE) tablet 50 mg, 50 mg, Oral, Q8H  melatonin tablet 3 mg, 3 mg, Oral, Nightly  thiamine tablet 100 mg, 100 mg, Oral, Daily  cefTRIAXone (ROCEPHIN) 2,000 mg in dextrose 5 % 50 mL IVPB mini-bag, 2,000 mg, IntraVENous, Q24H  potassium chloride (KLOR-CON M) extended release tablet 40 mEq, 40 mEq, Oral, PRN **OR** potassium bicarb-citric acid (EFFER-K) effervescent tablet 40 mEq, 40 mEq, Oral, PRN **OR** potassium

## 2022-11-03 NOTE — PROGRESS NOTES
Pt was asleep as the sitter was in the room. The nurse said that pt was confused and cannot process nothing. He was blessed.     11/03/22 1656   Encounter Summary   Service Provided For: Patient   Referral/Consult From: Mikayla   Last Encounter  11/03/22   Complexity of Encounter Low   Begin Time 1345   End Time  1459   Total Time Calculated 74 min   Spiritual/Emotional needs   Type Spiritual Support

## 2022-11-03 NOTE — PROGRESS NOTES
1240: This RN entered patient's room and patient was found in chair responsive to voice with minimal response to sternal rub. Patient presents with snoring respirations. Automatic BP cycled 63/37, radial HR 55 . 1:1 call placed to . Unable to connect with physician at this time. 1245: Additional manual BP obtained 70/42.    12:46 Rapid Response called due to low blood pressures and unresponsiveness. 12:47: Patient lifted from chair and put into trendelenburg position with no complications. 1248: Additional manual obtained BP 78/50   1249: EKG, Resident Physcian's, Dr. Cheri Still, [address] and Sandrita Madera,  and resource nurse Carolina Qureshi. at at bedside. 1251: Blood glucose 119. EKG demonstrates normal sinus rhythm, /56 HR 82 and SPO2 98.     1254: Verbal order to administere a 1L noral saline bolus brent 1 hour and recheck BP 15 minutes after bolus. 1400: Recheck blood pressure 111/51. Patient is alert to person and place, however remains disoriented to situation and time. 1500: Dr. Chris Pastrana, and Dr. Beba Arcos updated on patient status.  Will continue to monitor

## 2022-11-03 NOTE — PROGRESS NOTES
[address] [facility] PHYSICAL THERAPY  DAILY NOTE  STRZ ICU STEPDOWN TELEMETRY 4K - 3R-15/653-M    Time In: 1200  Time Out: 1225  Timed Code Treatment Minutes: 25 Minutes  Minutes: 25          Date: 11/3/2022  Patient Name: David Huff,  Gender:  male        MRN: [id number]  : 1955  (79 y.o.)     Referring Practitioner: Bhargav Dixon MD  Diagnosis: Fall at home, initial encounter  Additional Pertinent Hx: Rhett Dowd is a 49-year-old  male with past medical history significant for alcoholism, fatty liver disease,, HTN and TBI with subdural hematoma s/p craniotomy and evacuation in 2021 who presented to [facility] ED for altered mental status from nursing home. Pt was found crawling on the floor, he is altered and combative. Not following commands or answering questions. He has been refusing his meds. Pt did have a recent two week stay in psychiatric facility in [place] though no records available at this time. Pt unable to contribute to his history. MRI, EEG and LP negative. Prior Level of Function:  Type of Home: Facility ([facility])  Home Layout: One level        ADL Assistance: Needs assistance  Ambulation Assistance: Needs assistance  Transfer Assistance: Needs assistance  Additional Comments: Pt from [facility] prior to this admission. Per chart Pt was at a [facility] prior to admission to [facility]. Pt unable to give PLOF info d/t significant confusion. Restrictions/Precautions:  Restrictions/Precautions: Fall Risk  Position Activity Restriction  Other position/activity restrictions: confusion, high fall risk     SUBJECTIVE: pt incontinent of bowel and c/o nausea, sitter present during session, RN aware at end of session.     PAIN: no c/o pain    Vitals: Vitals not assessed per clinical judgement, see nursing flowsheet    OBJECTIVE:  Bed Mobility:  Not Tested    Transfers:  Sit to Stand: Minimal Assistance, Moderate Assistance, X 2  Stand to Sit:Minimal Assistance, Moderate Assistance, X 2  Cues for hand placement and wt shifts, EOB, bedside commode, WBOS, safety concerns  Ambulation:  Minimal Assistance, Moderate Assistance, X 2  Distance: 3'x2  Surface: Level Tile  Device: BUE support on walker or furniture  Gait Deviations: Forward Flexed Posture, Slow Caprice, Decreased Step Length Bilaterally, Decreased Heel Strike Bilaterally, Wide Base of Support, Unsteady Gait, and very fwd flexed at trunk and hip, multiple cues for sequencing    Balance:  Static Sitting Balance:  Minimal Assistance, to SBA< pt very fwd flexed and would lean to right at times, cues for erect posture  Static Standing Balance: Moderate Assistance, X 1, at [facility], tolerated around 1 min for sitter to clean up, cues for erect posture and head up, WBOS        Functional Outcome Measures: Completed  AM-PAC Inpatient Mobility without Stair Climbing Raw Score : 9  AM-PAC Inpatient without Stair Climbing T-Scale Score : 32.44    ASSESSMENT:  Assessment: Patient progressing toward established goals. and pt able to initiate transfers and gait requiring 2 assist for safe mobility, pt confused and easily agitated. C/o nausea with RN aware. Activity Tolerance:  Patient tolerance of  treatment: fair.       Equipment Recommendations:Equipment Needed: No  Discharge Recommendations: Continue to assess pending progress, ECF with PT, and Patient would benefit from continued PT at discharge  Plan: Current Treatment Recommendations: Strengthening, Balance training, Functional mobility training, Transfer training, Neuromuscular re-education, Safety education & training, Therapeutic activities, Patient/Caregiver education & training, Gait training  General Plan:  (3-5x GM)    Patient Education  Patient Education: Transfers, Gait    Goals:  Patient Goals : does not state  Short Term Goals  Time Frame for Short Term Goals: by discharge  Short Term Goal 1: Pt to transfer supine <--> sit SBA to enable pt to get in/out of bed.  Short Term Goal 2: Pt to transfer sit <--> stand CGA for increased functional mobility. Short Term Goal 3: amb >10'x1 with RW and CGA to walk safely to bathroom  Long Term Goals  Time Frame for Long Term Goals : NA due to short length of stay. Following session, patient left in safe position with all fall risk precautions in place.
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
## 2022-11-03 NOTE — PLAN OF CARE
Problem: Discharge Planning  Goal: Discharge to home or other facility with appropriate resources  11/2/2022 2233 by Kayleen Clay RN  Outcome: Progressing  Flowsheets (Taken 11/2/2022 1937)  Discharge to home or other facility with appropriate resources:   Identify barriers to discharge with patient and caregiver   Arrange for needed discharge resources and transportation as appropriate   Identify discharge learning needs (meds, wound care, etc)     Problem: Confusion  Goal: Confusion, delirium, dementia, or psychosis is improved or at baseline  Description: INTERVENTIONS:  1. Assess for possible contributors to thought disturbance, including medications, impaired vision or hearing, underlying metabolic abnormalities, dehydration, psychiatric diagnoses, and notify attending LIP  2. [place] high risk fall precautions, as indicated  3. Provide frequent short contacts to provide reality reorientation, refocusing and direction  4. Decrease environmental stimuli, including noise as appropriate  5. Monitor and intervene to maintain adequate nutrition, hydration, elimination, sleep and activity  6. If unable to ensure safety without constant attention obtain sitter and review sitter guidelines with assigned personnel  7.  Initiate Psychosocial CNS and Spiritual Care consult, as indicated  11/2/2022 2233 by Kayleen Clay RN  Outcome: Progressing  Flowsheets (Taken 11/2/2022 1937)  Effect of thought disturbance (confusion, delirium, dementia, or psychosis) are managed with adequate functional status:   Assess for contributors to thought disturbance, including medications, impaired vision or hearing, underlying metabolic abnormalities, dehydration, psychiatric diagnoses, notify [place] high risk fall precautions, as indicated   Provide frequent short contacts to provide reality reorientation, refocusing and direction   Decrease environmental stimuli, including noise as appropriate   Monitor and intervene to maintain adequate nutrition, hydration, elimination, sleep and activity     Problem: Skin/Tissue Integrity  Goal: Absence of new skin breakdown  Description: 1. Monitor for areas of redness and/or skin breakdown  2. Assess vascular access sites hourly  3. Every 4-6 hours minimum:  Change oxygen saturation probe site  4. Every 4-6 hours:  If on nasal continuous positive airway pressure, respiratory therapy assess nares and determine need for appliance change or resting period. 11/2/2022 2233 by Hayden Louis RN  Outcome: Progressing  Note: No s/s of new skin breakdown. Will continue to monitor.       Problem: ABCDS Injury Assessment  Goal: Absence of physical injury  11/2/2022 2233 by Hayden Louis RN  Outcome: Progressing  Flowsheets (Taken 11/2/2022 2233)  Absence of Physical Injury: Implement safety measures based on patient assessment     Problem: Safety - Adult  Goal: Free from fall injury  11/2/2022 2233 by Hayden Louis RN  Outcome: Progressing  Flowsheets (Taken 11/2/2022 2233)  Free From Fall Injury:   Based on caregiver fall risk screen, instruct family/caregiver to ask for assistance with transferring infant if caregiver noted to have fall risk factors   Instruct family/caregiver on patient safety     Problem: Pain  Goal: Verbalizes/displays adequate comfort level or baseline comfort level  11/2/2022 2233 by Hayden Louis RN  Outcome: Progressing  Flowsheets (Taken 11/2/2022 2233)  Verbalizes/displays adequate comfort level or baseline comfort level:   Encourage patient to monitor pain and request assistance   Assess pain using appropriate pain scale   Administer analgesics based on type and severity of pain and evaluate response   Implement non-pharmacological measures as appropriate and evaluate response   Consider cultural and social influences on pain and pain management   Notify Licensed Independent Practitioner if interventions unsuccessful or patient reports new pain    Care plan reviewed with patient. Patient verbalize understanding of the plan of care and contribute to goal setting.

## 2022-11-03 NOTE — PLAN OF CARE
managed with adequate functional status:   Assess for contributors to thought disturbance, including medications, impaired vision or hearing, underlying metabolic abnormalities, dehydration, psychiatric diagnoses, notify [place] high risk fall precautions, as indicated   Provide frequent short contacts to provide reality reorientation, refocusing and direction   Decrease environmental stimuli, including noise as appropriate   Monitor and intervene to maintain adequate nutrition, hydration, elimination, sleep and activity  Outcome: Progressing  Flowsheets (Taken 11/2/2022 1937)  Effect of thought disturbance (confusion, delirium, dementia, or psychosis) are managed with adequate functional status:   Assess for contributors to thought disturbance, including medications, impaired vision or hearing, underlying metabolic abnormalities, dehydration, psychiatric diagnoses, notify [place] high risk fall precautions, as indicated   Provide frequent short contacts to provide reality reorientation, refocusing and direction   Decrease environmental stimuli, including noise as appropriate   Monitor and intervene to maintain adequate nutrition, hydration, elimination, sleep and activity     Problem: Skin/Tissue Integrity  Goal: Absence of new skin breakdown  Description: 1. Monitor for areas of redness and/or skin breakdown  2. Assess vascular access sites hourly  3. Every 4-6 hours minimum:  Change oxygen saturation probe site  4. Every 4-6 hours:  If on nasal continuous positive airway pressure, respiratory therapy assess nares and determine need for appliance change or resting period. 11/3/2022 0832 by Mary Garduno RN  Outcome: Progressing  Note: Q2 turns  Outcome: Progressing  Note: No s/s of new skin breakdown. Will continue to monitor.       Problem: ABCDS Injury Assessment  Goal: Absence of physical injury  11/3/2022 4761 by Mary Garduno RN  Outcome: Progressing  Absence of Physical Injury: Implement safety measures based on patient assessment  11/2/2022 2233 by Sammi Roberts RN  Outcome: Progressing  Flowsheets (Taken 11/2/2022 2233)  Absence of Physical Injury: Implement safety measures based on patient assessment     Problem: Safety - Adult  Goal: Free from fall injury  11/3/2022 0832 by Dl Kennedy RN  Outcome: [place] (Taken 11/2/2022 2233 by Sammi Roberts RN)  Free From Fall Injury:   Based on caregiver fall risk screen, instruct family/caregiver to ask for assistance with transferring infant if caregiver noted to have fall risk factors   Instruct family/caregiver on patient safety  Outcome: Progressing  Flowsheets (Taken 11/2/2022 2233)  Free From Fall Injury:   Based on caregiver fall risk screen, instruct family/caregiver to ask for assistance with transferring infant if caregiver noted to have fall risk factors   Instruct family/caregiver on patient safety     Problem: Pain  Goal: Verbalizes/displays adequate comfort level or baseline comfort level  11/3/2022 0832 by Dl Kennedy RN  Outcome: Progressing  [address] (Taken 11/2/2022 2233 by Sammi Roberts RN)  Verbalizes/displays adequate comfort level or baseline comfort level:   Encourage patient to monitor pain and request assistance   Assess pain using appropriate pain scale   Administer analgesics based on type and severity of pain and evaluate response   Implement non-pharmacological measures as appropriate and evaluate response   Consider cultural and social influences on pain and pain management   Notify Licensed Independent Practitioner if interventions unsuccessful or patient reports new pain  Outcome: Progressing  Flowsheets (Taken 11/2/2022 2233)  Verbalizes/displays adequate comfort level or baseline comfort level:   Encourage patient to monitor pain and request assistance   Assess pain using appropriate pain scale   Administer analgesics based on type and severity of pain and evaluate response   Implement non-pharmacological measures as appropriate and evaluate response   Consider cultural and social influences on pain and pain management   Notify Licensed Independent Practitioner if interventions unsuccessful or patient reports new pain   Care plan reviewed with patient. Patient verbalizes understanding of the plan of care and contributes to goal setting.

## 2022-11-03 NOTE — CARE COORDINATION
11/3/22, 10:31 AM EDT    DISCHARGE PLANNING EVALUATION    Received call from [facility]. Facility will not be able to take patient back at discharge, due to behaviors and psychiatric needs. Spoke with sister, Leigh Ann Melo, to update. She shares that she has spoken with Dr Rajani Dodd. She is unsure yet what their plan will be for care at discharge, but states that since they now know [facility] cannot accept, they will consider other options. Unsure what these options will be yet, family has a limited number of ecfs that they are willing to consider and are opposed to danyelle-psych placement.

## 2022-11-03 NOTE — CARE COORDINATION
11/3/22, 8:30 AM EDT    DISCHARGE ON GOING EVALUATION    [address] [place] [address] day: 5  [facility] Reason for admit: Fall at home, initial encounter [W19. XXXA, Y92.009]  Altered mental status [R41.82]   Procedure:  10/31 EEG: This EEG was abnormal due to disorganized and slow background in polymorphic delta and theta frequency. Superimposed fast beta activity was seen over the right frontocentral leads. 11/1 MBS: minced and moist with thin liquids. 11/1 EEG: This EEG was abnormal. Disorganized and slow background suggested moderate encephalopathy of non specific etiology. Breach rhythm over the right frontocentral leads suggested underlying skull defect. Barriers to Discharge: Hospitalist, Nephrology, Psychiatry, and therapy following. Respiratory culture growing staphylococcus aureus & e coli. Receiving: D5 at 30 ml/hr, IV Rocephin, IV Folic acid, PRN IM Haldol, Potassium replacement. Vitals:    11/03/22 0730   BP: 118/68   Pulse: 89   Resp: 18   Temp: 98.9 °F (37.2 °C)   SpO2: 96%    On room air   11/2/22 14:10 11/2/22 20:13 11/3/22 00:00 11/3/22 04:27   Sodium 143 143 141 140   Potassium    3.0 (L)   PCP: Azalea Henao  Readmission Risk Score: 16.1%  Patient Goals/Plan/Treatment Preferences: From [facility]. SW following to assist with discharge planning.

## 2022-11-03 NOTE — PROGRESS NOTES
11/03/22 1306   Encounter Summary   Encounter Overview/Reason  Crisis  (Rapid Response)   Service Provided For: Patient   Referral/Consult From: Multi-disciplinary team   Support System Unknown   Last Encounter  11/03/22   Complexity of Encounter Low   Begin Time 1252   Crisis   Type Rapid Response   Assessment/Intervention/Outcome   Assessment Unable to assess   Intervention Prayer (assurance of)/[place];Sustaining Presence/Ministry of presence   Outcome Other (comment)  (unable to assess.)     Staff responded to the rapid response page, by sharing prayers for patient and the medical team, while remaining present in the hallway.

## 2022-11-03 NOTE — PROGRESS NOTES
[facility] THERAPY MISSED TREATMENT NOTE  [facility] ICU STEPDOWN TELEMETRY 4K      Date: 11/3/2022  Patient Name: Dakota Cano        MRN: [id number]    : 1955  (79 y.o.)    REASON FOR MISSED TREATMENT:  Attempted to see patient at 46. [facility], patient not appropriate at this time d/t recent rapid response called for patient. ST will f/u at a subsequent date to address treatment. Francisca Barroso.  Student Intern

## 2022-11-03 NOTE — PROGRESS NOTES
[facility]   Nephrology progress note  11/3/2022, 10:27 AM      Pt Name:    Clara Dee  MRN:     [id number]     YOB: 1955  Admit Date:    10/29/2022  7:54 AM    Chief Complaint: Nephrology following for hypernatremia, HENRY. Subjective:  Patient was seen and examined this morning. Remains agitated. Drinking fluids but not eating much. Objective:  24HR INTAKE/OUTPUT:    Intake/Output Summary (Last 24 hours) at 11/3/2022 1027  Last data filed at 11/3/2022 0902  Gross per 24 hour   Intake 2703.07 ml   Output 150 ml   Net 2553.07 ml         I/O last 3 completed shifts: In: 2983.1 [P.O.:1140;  I.V.:1042.2; IV Piggyback:800.8]  Out: 1036 [Urine:1036]  I/O this shift:  In: 120 [P.O.:120]  Out: 0    Admission weight: 189 lb 12.8 oz (86.1 kg)  Wt Readings from Last 3 Encounters:   11/01/22 188 lb 8 oz (85.5 kg)   01/19/22 184 lb (83.5 kg)   01/16/22 180 lb (81.6 kg)        Vitals :   Vitals:    11/02/22 2121 11/02/22 2319 11/03/22 0350 11/03/22 0730   BP: (!) 106/57 (!) 104/54 117/64 118/68   Pulse: (!) 105 99 99 89   Resp: 22 22 20 18   Temp: 99.3 °F (37.4 °C)   98.9 °F (37.2 °C)   TempSrc: Oral   Oral   SpO2: 99%  97% 96%   Weight:       Height:           Physical examination  General Appearance: awake, no distress, confused  Mouth/Throat: Oral mucosa moist  Neck: No JVD  Lungs: Air entry B/L, no rales, no use of accessory muscles  Heart:  S1, S2 heard  GI: soft, non-tender, no guarding  Extremities: no LE edema    Medications:  Infusion:    dextrose 30 mL/hr at 11/02/22 1951    sodium chloride       Meds:    potassium chloride  20 mEq Oral Daily with breakfast    mirtazapine  15 mg Oral Nightly    risperiDONE  0.5 mg Oral BID    multivitamin  1 tablet Oral Daily    nicotine  1 patch TransDERmal Q24H    pantoprazole  40 mg Oral BID    amLODIPine  10 mg Oral Daily    carvedilol  6.25 mg Oral BID    [Held by provider] hydrALAZINE  50 mg Oral Q8H    melatonin  3 mg Oral Nightly vitamin B-1  100 mg Oral Daily    cefTRIAXone (ROCEPHIN) IV  2,000 mg IntraVENous Q15V    folic acid IVPB  1 mg IntraVENous Daily    sodium chloride flush  5-40 mL IntraVENous 2 times per day    enoxaparin  40 mg SubCUTAneous Daily     Meds prn: haloperidol lactate, LORazepam, potassium chloride **OR** potassium alternative oral replacement **OR** potassium chloride, [Held by provider] hydrALAZINE, [Held by provider] metoprolol, sodium chloride flush, sodium chloride, ondansetron **OR** ondansetron, polyethylene glycol, acetaminophen **OR** acetaminophen     Lab Data :  CBC:   Recent Labs     11/01/22 0337 11/02/22  0641 11/03/22 0427   WBC 4.7* 7.0 6.2   HGB 8.0* 8.9* 8.6*   HCT 26.4* 29.1* 27.6*   PLT 66* 75* 63*     CMP:  Recent Labs     11/01/22 0337 11/01/22  1823 11/02/22  0641 11/02/22  1410 11/02/22  2013 11/03/22  0000 11/03/22 0427   *   < > 144   < > 143 141 140   K 4.0  --  4.1  --   --   --  3.0*   *  --  114*  --   --   --  108   CO2 17*  --  16*  --   --   --  17*   BUN 23*  --  18  --   --   --  15   CREATININE 1.2  --  1.1  --   --   --  1.2   GLUCOSE 107  --  85  --   --   --  99   CALCIUM 8.2*  --  8.8  --   --   --  8.4*    < > = values in this interval not displayed. Hepatic:   Recent Labs     11/01/22 0337 11/02/22  0641 11/03/22  0427   LABALBU 2.5* 3.0* 2.8*   AST 48* 65* 58*   ALT 28 35 36   BILITOT 0.4 0.4 0.5   ALKPHOS 125 142* 133*         Assessment and Plan:  Hypernatremia from volume depletion. Does not appear to be diabetes insipidus as his urine osmolality is high and does not appear to have polyuria. Last Na was 140. Will stop D5W and repeat in AM. Continue to encourage increased po fluid intake  HENRY from volume depletion, improving  Hypokalemia: receiving 60 meq IVPB this morning. Start kcl 20 meq daily. Repeat this afternoon along with magnesium  Acid/Base: low serum bicarb but blood gas is showing primary respiratory alkalosis.   pH is 7.43.  will hold further bicarb  Altered mental status  Pancytopenia  Pneumonia  Hx alcohol use      D/W patient     Terence Rides, DO  [facility]    This report has been created using voice recognition software.  It may contain minor errors which are inherent in voice recognition technology
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
## 2022-11-03 NOTE — FLOWSHEET NOTE
11/03/22 1748   Safe Environment   Safety Measures Other (comment)  (vn safety check complete sitter answered audio , denied any needs at this time)

## 2022-11-04 ENCOUNTER — APPOINTMENT (OUTPATIENT)
Dept: GENERAL RADIOLOGY | Age: 67
DRG: 640 | End: 2022-11-04
Payer: MEDICARE

## 2022-11-04 LAB
ALBUMIN SERPL-MCNC: 2.5 G/DL (ref 3.5–5.1)
ALP BLD-CCNC: 120 U/L (ref 38–126)
ALT SERPL-CCNC: 30 U/L (ref 11–66)
ANAEROBIC CULTURE: NORMAL
ANION GAP SERPL CALCULATED.3IONS-SCNC: 11 MEQ/L (ref 8–16)
AST SERPL-CCNC: 40 U/L (ref 5–40)
BASOPHILS # BLD: 0.2 %
BASOPHILS ABSOLUTE: 0 THOU/MM3 (ref 0–0.1)
BILIRUB SERPL-MCNC: 0.5 MG/DL (ref 0.3–1.2)
BUN BLDV-MCNC: 12 MG/DL (ref 7–22)
CALCIUM SERPL-MCNC: 8 MG/DL (ref 8.5–10.5)
CHLORIDE BLD-SCNC: 112 MEQ/L (ref 98–111)
CO2: 18 MEQ/L (ref 23–33)
CREAT SERPL-MCNC: 1.1 MG/DL (ref 0.4–1.2)
CSF CULTURE: NORMAL
EOSINOPHIL # BLD: 2 %
EOSINOPHILS ABSOLUTE: 0.1 THOU/MM3 (ref 0–0.4)
ERYTHROCYTE [DISTWIDTH] IN BLOOD BY AUTOMATED COUNT: 16.2 % (ref 11.5–14.5)
ERYTHROCYTE [DISTWIDTH] IN BLOOD BY AUTOMATED COUNT: 51.6 FL (ref 35–45)
GFR SERPL CREATININE-BSD FRML MDRD: > 60 ML/MIN/1.73M2
GLUCOSE BLD-MCNC: 98 MG/DL (ref 70–108)
GRAM STAIN RESULT: NORMAL
HCT VFR BLD CALC: 25.4 % (ref 42–52)
HEMOGLOBIN: 7.9 GM/DL (ref 14–18)
IMMATURE GRANS (ABS): 0.03 THOU/MM3 (ref 0–0.07)
IMMATURE GRANULOCYTES: 0.5 %
LYMPHOCYTES # BLD: 14.4 %
LYMPHOCYTES ABSOLUTE: 0.8 THOU/MM3 (ref 1–4.8)
MCH RBC QN AUTO: 27.1 PG (ref 26–33)
MCHC RBC AUTO-ENTMCNC: 31.1 GM/DL (ref 32.2–35.5)
MCV RBC AUTO: 87 FL (ref 80–94)
MONOCYTES # BLD: 10.5 %
MONOCYTES ABSOLUTE: 0.6 THOU/MM3 (ref 0.4–1.3)
NUCLEATED RED BLOOD CELLS: 0 /100 WBC
PLATELET # BLD: 60 THOU/MM3 (ref 130–400)
PMV BLD AUTO: 11.8 FL (ref 9.4–12.4)
POTASSIUM SERPL-SCNC: 3.4 MEQ/L (ref 3.5–5.2)
RBC # BLD: 2.92 MILL/MM3 (ref 4.7–6.1)
SEG NEUTROPHILS: 72.4 %
SEGMENTED NEUTROPHILS ABSOLUTE COUNT: 4.1 THOU/MM3 (ref 1.8–7.7)
SODIUM BLD-SCNC: 141 MEQ/L (ref 135–145)
TOTAL PROTEIN: 5.5 G/DL (ref 6.1–8)
WBC # BLD: 5.6 THOU/MM3 (ref 4.8–10.8)

## 2022-11-04 PROCEDURE — 97110 THERAPEUTIC EXERCISES: CPT

## 2022-11-04 PROCEDURE — 71045 X-RAY EXAM CHEST 1 VIEW: CPT

## 2022-11-04 PROCEDURE — 2500000003 HC RX 250 WO HCPCS

## 2022-11-04 PROCEDURE — 6360000002 HC RX W HCPCS: Performed by: STUDENT IN AN ORGANIZED HEALTH CARE EDUCATION/TRAINING PROGRAM

## 2022-11-04 PROCEDURE — 2060000000 HC ICU INTERMEDIATE R&B

## 2022-11-04 PROCEDURE — 6370000000 HC RX 637 (ALT 250 FOR IP): Performed by: STUDENT IN AN ORGANIZED HEALTH CARE EDUCATION/TRAINING PROGRAM

## 2022-11-04 PROCEDURE — 97530 THERAPEUTIC ACTIVITIES: CPT

## 2022-11-04 PROCEDURE — 6370000000 HC RX 637 (ALT 250 FOR IP): Performed by: INTERNAL MEDICINE

## 2022-11-04 PROCEDURE — 6370000000 HC RX 637 (ALT 250 FOR IP): Performed by: PSYCHIATRY & NEUROLOGY

## 2022-11-04 PROCEDURE — 85025 COMPLETE CBC W/AUTO DIFF WBC: CPT

## 2022-11-04 PROCEDURE — 80053 COMPREHEN METABOLIC PANEL: CPT

## 2022-11-04 PROCEDURE — 6360000002 HC RX W HCPCS: Performed by: NURSE PRACTITIONER

## 2022-11-04 PROCEDURE — 2580000003 HC RX 258

## 2022-11-04 PROCEDURE — 2580000003 HC RX 258: Performed by: NURSE PRACTITIONER

## 2022-11-04 PROCEDURE — 2580000003 HC RX 258: Performed by: STUDENT IN AN ORGANIZED HEALTH CARE EDUCATION/TRAINING PROGRAM

## 2022-11-04 PROCEDURE — 36415 COLL VENOUS BLD VENIPUNCTURE: CPT

## 2022-11-04 PROCEDURE — 99232 SBSQ HOSP IP/OBS MODERATE 35: CPT | Performed by: INTERNAL MEDICINE

## 2022-11-04 PROCEDURE — 97116 GAIT TRAINING THERAPY: CPT

## 2022-11-04 RX ORDER — POTASSIUM CHLORIDE 20 MEQ/1
20 TABLET, EXTENDED RELEASE ORAL
Status: DISCONTINUED | OUTPATIENT
Start: 2022-11-04 | End: 2022-11-11 | Stop reason: HOSPADM

## 2022-11-04 RX ORDER — POTASSIUM CHLORIDE 20 MEQ/1
20 TABLET, EXTENDED RELEASE ORAL 2 TIMES DAILY WITH MEALS
Status: DISCONTINUED | OUTPATIENT
Start: 2022-11-04 | End: 2022-11-04

## 2022-11-04 RX ADMIN — POTASSIUM CHLORIDE 20 MEQ: 1500 TABLET, EXTENDED RELEASE ORAL at 17:31

## 2022-11-04 RX ADMIN — CARVEDILOL 6.25 MG: 6.25 TABLET, FILM COATED ORAL at 08:36

## 2022-11-04 RX ADMIN — SODIUM CHLORIDE, PRESERVATIVE FREE 10 ML: 5 INJECTION INTRAVENOUS at 08:37

## 2022-11-04 RX ADMIN — MIRTAZAPINE 15 MG: 15 TABLET, FILM COATED ORAL at 20:46

## 2022-11-04 RX ADMIN — Medication 100 MG: at 08:36

## 2022-11-04 RX ADMIN — POTASSIUM CHLORIDE 20 MEQ: 1500 TABLET, EXTENDED RELEASE ORAL at 08:40

## 2022-11-04 RX ADMIN — PANTOPRAZOLE SODIUM 40 MG: 40 TABLET, DELAYED RELEASE ORAL at 08:40

## 2022-11-04 RX ADMIN — FOLIC ACID 1 MG: 5 INJECTION, SOLUTION INTRAMUSCULAR; INTRAVENOUS; SUBCUTANEOUS at 09:39

## 2022-11-04 RX ADMIN — AMLODIPINE BESYLATE 10 MG: 10 TABLET ORAL at 08:36

## 2022-11-04 RX ADMIN — Medication 3 MG: at 20:47

## 2022-11-04 RX ADMIN — RISPERIDONE 0.5 MG: 1 TABLET ORAL at 08:37

## 2022-11-04 RX ADMIN — PANTOPRAZOLE SODIUM 40 MG: 40 TABLET, DELAYED RELEASE ORAL at 20:47

## 2022-11-04 RX ADMIN — ENOXAPARIN SODIUM 40 MG: 100 INJECTION SUBCUTANEOUS at 08:40

## 2022-11-04 RX ADMIN — Medication 1 TABLET: at 12:49

## 2022-11-04 RX ADMIN — LORAZEPAM 0.5 MG: 0.5 TABLET ORAL at 17:31

## 2022-11-04 RX ADMIN — SODIUM CHLORIDE, PRESERVATIVE FREE 10 ML: 5 INJECTION INTRAVENOUS at 20:47

## 2022-11-04 RX ADMIN — CARVEDILOL 6.25 MG: 6.25 TABLET, FILM COATED ORAL at 20:46

## 2022-11-04 RX ADMIN — POTASSIUM CHLORIDE 20 MEQ: 1500 TABLET, EXTENDED RELEASE ORAL at 12:49

## 2022-11-04 RX ADMIN — CEFTRIAXONE 2000 MG: 2 INJECTION, POWDER, FOR SOLUTION INTRAMUSCULAR; INTRAVENOUS at 01:59

## 2022-11-04 RX ADMIN — RISPERIDONE 0.5 MG: 1 TABLET ORAL at 20:46

## 2022-11-04 ASSESSMENT — PAIN SCALES - GENERAL: PAINLEVEL_OUTOF10: 0

## 2022-11-04 NOTE — PROGRESS NOTES
[id number] [place] [id number]  INPATIENT PHYSICAL THERAPY  DAILY NOTE  [facility] ICU STEPDOWN TELEMETRY 4K - [id number]      Time In: 1402  Time Out: 1443  Timed Code Treatment Minutes: 41 Minutes  Minutes: 41          Date: 2022  Patient Name: Jasmina Chavez,  Gender:  male        MRN: [id number]  : 1955  (79 y.o.)     Referring Practitioner: Cindi Dale MD  Diagnosis: Fall at home, initial encounter  Additional Pertinent Hx: Rosemary Hand is a 68-year-old  male with past medical history significant for alcoholism, fatty liver disease,, HTN and TBI with subdural hematoma s/p craniotomy and evacuation in 2021 who presented to [facility] for altered mental status from nursing home. Pt was found crawling on the floor, he is altered and combative. Not following commands or answering questions. He has been refusing his meds. Pt did have a recent two week stay in psychiatric facility in [place] though no records available at this time. Pt unable to contribute to his history. MRI, EEG and LP negative. Prior Level of Function:  Type of Home: Facility ([facility])  Home Layout: One level        ADL Assistance: Needs assistance  Ambulation Assistance: Needs assistance  Transfer Assistance: Needs assistance  Additional Comments: Pt from [facility] prior to this admission. Per chart Pt was at a [facility] prior to admission to [facility]. Pt unable to give PLOF info d/t significant confusion.     Restrictions/Precautions:  Restrictions/Precautions: Fall Risk  Position Activity Restriction  Other position/activity restrictions: confusion, high fall risk       SUBJECTIVE: nursing ok'd therapy pt on the bedpan on arrival and needed assist to get off, pt confused and had difficulty following directions he needed redirected throughout session   - pt was left up in the chair w/ sitter at bedside at end of session   PAIN: he reported pain in his left LE when I initially asked however later in session he didn't state he had pain     Vitals: Oxygen: on room air sats 98% he was SOB with activity   Heart Rate: in low 100's with activity     OBJECTIVE:  Bed Mobility:  Rolling to Left: Minimal Assistance, with rail, with verbal cues , and hand over hand to reach for rail   Rolling to Right: Minimal Assistance, with rail, with verbal cues , with increased time for completion, hand over hand    Supine to Sit: Moderate Assistance, with extra cues - noted post lean as he was trying to sit up- pt impulsive   Scooting: Minimal Assistance, with verbal cues , with increased time for completion    Transfers:  Sit to Stand: Moderate Assistance, X 2, initially and he didn't follow cues for had placement- however by end of session min assist of one and SBA of another to complete with cues for wt shifting forward   Stand to [id number] [facility], x1-2 persons     Ambulation:  Moderate Assistance, to min assist of one and CGA of another   Distance: 6 feet forward then few steps back to the chair   Surface: Level Tile  Device:Rolling Walker  Gait Deviations:  pt demonstrated a heavy post lean which did improve initially then as he was turning he was leaning more, noted no toe off and decreased step length, he needed assist to guide the walker     Balance:  Sitting edge of bed pt completed reaching task forward to encourage wt shifting forward with SBA   -standing balance at walker pt needed mod assist x 1 to min assist of one and CGA of another- he demonstrated a heavy post lean and not keeping toes to the floor- attempted several reaching task to get wt shifting over his toes but pt struggled with this concept- he also initially was leaning to the right but this improved after a few stands- pt stood for ~ 5 trials   Exercise:  Patient was guided in 1 set(s) 10 reps of exercise to both lower extremities. Ankle pumps, Seated marches, and Long arc quads.   Exercises were completed for increased independence with functional mobility. Functional Outcome Measures: Completed  AM-PAC Inpatient Mobility without Stair Climbing Raw Score : 9  AM-PAC Inpatient without Stair Climbing T-Scale Score : 32.44    ASSESSMENT:  Assessment: Patient progressing toward established goals. Activity Tolerance:  Patient tolerance of  treatment: fair. Equipment Recommendations:Equipment Needed: No  Discharge Recommendations: Continue to assess pending progress, ECF vs SNF   Plan: Current Treatment Recommendations: Strengthening, Balance training, Functional mobility training, Transfer training, Neuromuscular re-education, Safety education & training, Therapeutic activities, Patient/Caregiver education & training, Gait training  General Plan:  (3-5x GM)    Patient Education  Patient Education: Plan of Care    Goals:  Patient Goals : does not state  Short Term Goals  Time Frame for Short Term Goals: by discharge  Short Term Goal 1: Pt to transfer supine <--> sit SBA to enable pt to get in/out of bed. Short Term Goal 2: Pt to transfer sit <--> stand CGA for increased functional mobility. Short Term Goal 3: amb >10'x1 with RW and CGA to walk safely to bathroom  Long Term Goals  Time Frame for Long Term Goals : NA due to short length of stay. Following session, patient left in safe position with all fall risk precautions in place.

## 2022-11-04 NOTE — PLAN OF CARE
Problem: Discharge Planning  Goal: Discharge to home or other facility with appropriate resources  11/3/2022 2204 by Sarika Santoro RN  Outcome: Progressing  Flowsheets (Taken 11/3/2022 1950)  Discharge to home or other facility with appropriate resources:   Identify barriers to discharge with patient and caregiver   Arrange for needed discharge resources and transportation as appropriate   Identify discharge learning needs (meds, wound care, etc)     Problem: Confusion  Goal: Confusion, delirium, dementia, or psychosis is improved or at baseline  Description: INTERVENTIONS:  1. Assess for possible contributors to thought disturbance, including medications, impaired vision or hearing, underlying metabolic abnormalities, dehydration, psychiatric diagnoses, and notify attending LIP  2. [place] high risk fall precautions, as indicated  3. Provide frequent short contacts to provide reality reorientation, refocusing and direction  4. Decrease environmental stimuli, including noise as appropriate  5. Monitor and intervene to maintain adequate nutrition, hydration, elimination, sleep and activity  6. If unable to ensure safety without constant attention obtain sitter and review sitter guidelines with assigned personnel  7.  Initiate Psychosocial CNS and Spiritual Care consult, as indicated  11/3/2022 2204 by Sarika Santoro RN  Outcome: Progressing  Flowsheets (Taken 11/3/2022 1950)  Effect of thought disturbance (confusion, delirium, dementia, or psychosis) are managed with adequate functional status:   Assess for contributors to thought disturbance, including medications, impaired vision or hearing, underlying metabolic abnormalities, dehydration, psychiatric diagnoses, notify Triston Rivera high risk fall precautions, as indicated   Provide frequent short contacts to provide reality reorientation, refocusing and direction   Decrease environmental stimuli, including noise as appropriate     Problem: Skin/Tissue Integrity  Goal: Absence of new skin breakdown  Description: 1. Monitor for areas of redness and/or skin breakdown  2. Assess vascular access sites hourly  3. Every 4-6 hours minimum:  Change oxygen saturation probe site  4. Every 4-6 hours:  If on nasal continuous positive airway pressure, respiratory therapy assess nares and determine need for appliance change or resting period. 11/3/2022 2204 by Joseph Coffman RN  Outcome: Progressing  Note: No s/s of new skin breakdown. Will continue to monitor. Problem: ABCDS Injury Assessment  Goal: Absence of physical injury  11/3/2022 2204 by Joseph Coffman RN  Outcome: Progressing  Flowsheets (Taken 11/3/2022 2204)  Absence of Physical Injury: Implement safety measures based on patient assessment     Problem: Safety - Adult  Goal: Free from fall injury  11/3/2022 2204 by Joseph Coffman RN  Outcome: Progressing  Flowsheets (Taken 11/3/2022 2204)  Free From Fall Injury:   [place] family/caregiver on patient safety   Based on caregiver fall risk screen, instruct family/caregiver to ask for assistance with transferring infant if caregiver noted to have fall risk factors     Problem: Pain  Goal: Verbalizes/displays adequate comfort level or baseline comfort level  11/3/2022 2204 by Joseph Coffman RN  Outcome: Progressing  Flowsheets (Taken 11/2/2022 2233)  Verbalizes/displays adequate comfort level or baseline comfort level:   Encourage patient to monitor pain and request assistance   Assess pain using appropriate pain scale   Administer analgesics based on type and severity of pain and evaluate response   Implement non-pharmacological measures as appropriate and evaluate response   Consider cultural and social influences on pain and pain management   Notify Licensed Independent Practitioner if interventions unsuccessful or patient reports new pain    Care plan reviewed with patient. Patient verbalize understanding of the plan of care and contribute to goal setting.
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
## 2022-11-04 NOTE — FLOWSHEET NOTE
11/04/22 1429   Safe Environment   Safety Measures Other (comment)  (Virtual nurse rounding complete)   No response to audio. Camera turned on to check patient safety. Patient sitting up in chair. Sitter at bedside. No needs identified at this time.

## 2022-11-04 NOTE — CARE COORDINATION
11/4/22, 9:04 AM EDT    DISCHARGE ON GOING EVALUATION    [facility] day: 6  Location: [facility] Reason for admit: Fall at home, initial encounter [W19. XXXA, Y92.009]  Altered mental status [R41.82]   Procedure:   10/29 Intubated  10/30 MRI Brain: No acute findings; Areas of volume loss and abnormal signal in the inferior right frontal lobe and the right parietal lobe. Both these areas have associated prior hemorrhage. These could be posttraumatic injuries versus sites of old infarct. These do not appear acute; Mild severity chronic small vessel ischemic changes  10/30 EEG: No seizures noted; suggests moderate encelphaopathy  10/30 LP: Negative  10/30 Extubated  10/31 CXR: Interval removal of the endotracheal tube. Borderline cardiomegaly. Slight increased density at both lung bases. Atherosclerotic calcification in the aortic arch. 10/31 EEG: This EEG was abnormal due to disorganized and slow background in polymorphic delta and theta frequency. Superimposed fast beta activity was seen over the right frontocentral leads. 11/1 MBS: minced and moist with thin liquids. 11/1 EEG: This EEG was abnormal. Disorganized and slow background suggested moderate encephalopathy of non specific etiology. Breach rhythm over the right frontocentral leads suggested underlying skull defect. Barriers to Discharge: Hospitalist, Nephrology, Psychiatry, and therapy following. Respiratory culture growing staphylococcus aureus & e coli. Receiving: IV Rocephin, IV Folic acid, PRN IM Haldol, Potassium replacement. 11/3/22 04:27 11/3/22 13:04 11/4/22 04:53   Sodium 140 137 141   Potassium 3.0 (L) 4.0 3.4 (L)   PCP: Rhona Tran  Readmission Risk Score: 17%  Patient Goals/Plan/Treatment Preferences: From [facility]. SW following to assist with discharge planning as patient unable to return. Family declining [facility] facility.

## 2022-11-04 NOTE — PROGRESS NOTES
[facility]   Nephrology progress note  11/4/2022, 7:47 AM      Pt Name:    Vijaya Morgan  MRN:     [id number]     YOB: 1955  Admit Date:    10/29/2022  7:54 AM    Chief Complaint: Nephrology following for hypernatremia, HENRY. Subjective:  Patient was seen and examined this morning. Continued agitated noted overnight. Bps stable. Off fluids, Na stable. Objective:  24HR INTAKE/OUTPUT:    Intake/Output Summary (Last 24 hours) at 11/4/2022 0747  Last data filed at 11/4/2022 0513  Gross per 24 hour   Intake 1140 ml   Output 640 ml   Net 500 ml         I/O last 3 completed shifts: In: 3523.1 [P.O.:1680; I.V.:1042.2; IV Piggyback:800.8]  Out: 640 [Urine:640]  No intake/output data recorded.    Admission weight: 189 lb 12.8 oz (86.1 kg)  Wt Readings from Last 3 Encounters:   11/04/22 191 lb (86.6 kg)   01/19/22 184 lb (83.5 kg)   01/16/22 180 lb (81.6 kg)        Vitals :   Vitals:    11/03/22 1713 11/03/22 1950 11/04/22 0018 11/04/22 0327   BP: 103/74 120/60 138/75 130/72   Pulse: 90 83 89 80   Resp: 20 20 16 18   Temp:  99.3 °F (37.4 °C) 98.8 °F (37.1 °C) 98.5 °F (36.9 °C)   TempSrc:  Oral Oral Oral   SpO2:  98%  97%   Weight:    191 lb (86.6 kg)   Height:           Physical examination  General Appearance: awake, agitated  Mouth/Throat: Oral mucosa moist  Neck: No JVD  Lungs: Air entry B/L, no rales, no use of accessory muscles  Heart:  S1, S2 heard  GI: soft, non-tender, no guarding  Extremities: no LE edema    Medications:  Infusion:    sodium chloride       Meds:    potassium chloride  20 mEq Oral TID WC    mirtazapine  15 mg Oral Nightly    risperiDONE  0.5 mg Oral BID    multivitamin  1 tablet Oral Daily    nicotine  1 patch TransDERmal Q24H    pantoprazole  40 mg Oral BID    amLODIPine  10 mg Oral Daily    carvedilol  6.25 mg Oral BID    [Held by provider] hydrALAZINE  50 mg Oral Q8H    melatonin  3 mg Oral Nightly    vitamin B-1  100 mg Oral Daily    cefTRIAXone (ROCEPHIN) IV  2,000 mg IntraVENous D07N    folic acid IVPB  1 mg IntraVENous Daily    sodium chloride flush  5-40 mL IntraVENous 2 times per day    enoxaparin  40 mg SubCUTAneous Daily     Meds prn: haloperidol lactate, LORazepam, potassium chloride **OR** potassium alternative oral replacement **OR** potassium chloride, [Held by provider] hydrALAZINE, [Held by provider] metoprolol, sodium chloride flush, sodium chloride, ondansetron **OR** ondansetron, polyethylene glycol, acetaminophen **OR** acetaminophen     Lab Data :  CBC:   Recent Labs     11/02/22  0641 11/03/22 0427 11/04/22 0453   WBC 7.0 6.2 5.6   HGB 8.9* 8.6* 7.9*   HCT 29.1* 27.6* 25.4*   PLT 75* 63* 60*     CMP:  Recent Labs     11/03/22  0427 11/03/22  1304 11/04/22  0453    137 141   K 3.0* 4.0 3.4*    107 112*   CO2 17* 17* 18*   BUN 15 15 12   CREATININE 1.2 1.4* 1.1   GLUCOSE 99 113* 98   CALCIUM 8.4* 8.2* 8.0*     Hepatic:   Recent Labs     11/02/22  0641 11/03/22 0427 11/04/22  0453   LABALBU 3.0* 2.8* 2.5*   AST 65* 58* 40   ALT 35 36 30   BILITOT 0.4 0.5 0.5   ALKPHOS 142* 133* 120         Assessment and Plan:  Hypernatremia from volume depletion. Does not appear to be diabetes insipidus as his urine osmolality is high and does not appear to have polyuria. Sodium back to normal. Fluids off  HENRY from volume depletion, improved  Hypokalemia: improved, increase kcl to 20 meq tid  Acid/Base: low serum bicarb but blood gas is showing primary respiratory alkalosis. pH is 7.43.  will hold further bicarb  Altered mental status  Pancytopenia  Pneumonia  Hx alcohol use    Will see as needed over the weekend. D/W patient  and RN. [id number] Ana Villela DO  [facility]    This report has been created using voice recognition software.  It may contain minor errors which are inherent in voice recognition technology

## 2022-11-04 NOTE — PROGRESS NOTES
Daily Progress Note  Aniya Browning MD  11/4/2022    Reviewed patient's current plan of care and vital signs with nursing staff. Patient is seen with sitter. He slept on and off last night and also on and off during the day. Per laurel, he was asking earlier to be discharged. He is improving. He is able to carry a decent conversation. He is less confused. SUBJECTIVE:    Patient is feeling better. No suicidal ideation. ROS: Patient has new complaints:  No  Sleeping adequately:  Yes      Mental Status Examination:  Patient is cooperative. Speech: Normal rate and tone. No abnormal movements, tics or mannerisms. Mood euthymic; affect restricted. Suicidal ideation Absent. Homicidal ideations Absent. Hallucinations Absent. Delusions Absent. Thought Content: abnormal. Thought Processes: More appropriate. Alert and oriented X 2, partially to time. Attention and concentration impaired. MEMORY not tested. Insight and Judgement impaired judgment.       Medications  Current Facility-Administered Medications: potassium chloride (KLOR-CON M) extended release tablet 20 mEq, 20 mEq, Oral, TID WC  haloperidol lactate (HALDOL) injection 5 mg, 5 mg, IntraMUSCular, Q6H PRN  mirtazapine (REMERON) tablet 15 mg, 15 mg, Oral, Nightly  risperiDONE (RISPERDAL) tablet 0.5 mg, 0.5 mg, Oral, BID  multivitamin 1 tablet, 1 tablet, Oral, Daily  nicotine (NICODERM CQ) 21 MG/24HR 1 patch, 1 patch, TransDERmal, Q24H  pantoprazole (PROTONIX) tablet 40 mg, 40 mg, Oral, BID  LORazepam (ATIVAN) tablet 0.5 mg, 0.5 mg, Oral, TID PRN  amLODIPine (NORVASC) tablet 10 mg, 10 mg, Oral, Daily  carvedilol (COREG) tablet 6.25 mg, 6.25 mg, Oral, BID  [Held by provider] hydrALAZINE (APRESOLINE) tablet 50 mg, 50 mg, Oral, Q8H  melatonin tablet 3 mg, 3 mg, Oral, Nightly  thiamine tablet 100 mg, 100 mg, Oral, Daily  cefTRIAXone (ROCEPHIN) 2,000 mg in dextrose 5 % 50 mL IVPB mini-bag, 2,000 mg, IntraVENous, Q24H  potassium chloride (KLOR-CON M) extended release tablet 40 mEq, 40 mEq, Oral, PRN **OR** potassium bicarb-citric acid (EFFER-K) effervescent tablet 40 mEq, 40 mEq, Oral, PRN **OR** potassium chloride 10 mEq/100 mL IVPB (Peripheral Line), 10 mEq, IntraVENous, PRN  [Held by provider] hydrALAZINE (APRESOLINE) injection 5 mg, 5 mg, IntraVENous, Q6H PRN  [Held by provider] metoprolol (LOPRESSOR) injection 5 mg, 5 mg, IntraVENous, S2D PRN  folic acid 1 mg in sodium chloride 0.9 % 50 mL IVPB, 1 mg, IntraVENous, Daily  sodium chloride flush 0.9 % injection 5-40 mL, 5-40 mL, IntraVENous, 2 times per day  sodium chloride flush 0.9 % injection 5-40 mL, 5-40 mL, IntraVENous, PRN  0.9 % sodium chloride infusion, , IntraVENous, PRN  enoxaparin (LOVENOX) injection 40 mg, 40 mg, SubCUTAneous, Daily  ondansetron (ZOFRAN-ODT) disintegrating tablet 4 mg, 4 mg, Oral, Q8H PRN **OR** ondansetron (ZOFRAN) injection 4 mg, 4 mg, IntraVENous, Q6H PRN  polyethylene glycol (GLYCOLAX) packet 17 g, 17 g, Oral, Daily PRN  acetaminophen (TYLENOL) tablet 650 mg, 650 mg, Oral, Q6H PRN **OR** acetaminophen (TYLENOL) suppository 650 mg, 650 mg, Rectal, Q6H PRN    ASSESSMENT AND PLAN  Patient's symptoms are improving some  Continue with current psychotropics. Attempt to develop insight  Psycho-education conducted. Supportive Therapy conducted.

## 2022-11-04 NOTE — CARE COORDINATION
11/4/22, 11:23 AM EDT    DISCHARGE PLANNING EVALUATION      No discharge plan yet in place. [facility] cannot accept back, family is declining [facility]. Sister is aware that [facility] will not accept back, does not have preferences yet for other options for care at discharge. Sister plans to discuss with other family.

## 2022-11-05 ENCOUNTER — APPOINTMENT (OUTPATIENT)
Dept: GENERAL RADIOLOGY | Age: 67
DRG: 640 | End: 2022-11-05
Payer: MEDICARE

## 2022-11-05 LAB
ALBUMIN SERPL-MCNC: 2.4 G/DL (ref 3.5–5.1)
ALP BLD-CCNC: 120 U/L (ref 38–126)
ALT SERPL-CCNC: 30 U/L (ref 11–66)
ANION GAP SERPL CALCULATED.3IONS-SCNC: 12 MEQ/L (ref 8–16)
AST SERPL-CCNC: 41 U/L (ref 5–40)
BASOPHILS # BLD: 0.2 %
BASOPHILS ABSOLUTE: 0 THOU/MM3 (ref 0–0.1)
BILIRUB SERPL-MCNC: 0.4 MG/DL (ref 0.3–1.2)
BUN BLDV-MCNC: 12 MG/DL (ref 7–22)
CALCIUM SERPL-MCNC: 8.1 MG/DL (ref 8.5–10.5)
CHLORIDE BLD-SCNC: 110 MEQ/L (ref 98–111)
CO2: 16 MEQ/L (ref 23–33)
CREAT SERPL-MCNC: 1 MG/DL (ref 0.4–1.2)
DIFFERENTIAL TYPE: ABNORMAL
EOSINOPHIL # BLD: 3 %
EOSINOPHILS ABSOLUTE: 0.1 THOU/MM3 (ref 0–0.4)
ERYTHROCYTE [DISTWIDTH] IN BLOOD BY AUTOMATED COUNT: 16.3 % (ref 11.5–14.5)
ERYTHROCYTE [DISTWIDTH] IN BLOOD BY AUTOMATED COUNT: 52.3 FL (ref 35–45)
GFR SERPL CREATININE-BSD FRML MDRD: > 60 ML/MIN/1.73M2
GLUCOSE BLD-MCNC: 143 MG/DL (ref 70–108)
HCT VFR BLD CALC: 25.2 % (ref 42–52)
HEMOGLOBIN: 7.7 GM/DL (ref 14–18)
HYPOCHROMIA: PRESENT
IMMATURE GRANS (ABS): 0.05 THOU/MM3 (ref 0–0.07)
IMMATURE GRANULOCYTES: 1.2 %
LYMPHOCYTES # BLD: 21.4 %
LYMPHOCYTES ABSOLUTE: 0.9 THOU/MM3 (ref 1–4.8)
MAGNESIUM: 1.8 MG/DL (ref 1.6–2.4)
MCH RBC QN AUTO: 26.9 PG (ref 26–33)
MCHC RBC AUTO-ENTMCNC: 30.6 GM/DL (ref 32.2–35.5)
MCV RBC AUTO: 88.1 FL (ref 80–94)
MONOCYTES # BLD: 10.2 %
MONOCYTES ABSOLUTE: 0.4 THOU/MM3 (ref 0.4–1.3)
NUCLEATED RED BLOOD CELLS: 0 /100 WBC
PATHOLOGIST REVIEW: ABNORMAL
PLATELET # BLD: 68 THOU/MM3 (ref 130–400)
PLATELET ESTIMATE: ABNORMAL
PMV BLD AUTO: 10.8 FL (ref 9.4–12.4)
POTASSIUM SERPL-SCNC: 3.7 MEQ/L (ref 3.5–5.2)
RBC # BLD: 2.86 MILL/MM3 (ref 4.7–6.1)
SCAN OF BLOOD SMEAR: NORMAL
SEG NEUTROPHILS: 64 %
SEGMENTED NEUTROPHILS ABSOLUTE COUNT: 2.6 THOU/MM3 (ref 1.8–7.7)
SODIUM BLD-SCNC: 138 MEQ/L (ref 135–145)
TOTAL PROTEIN: 5.4 G/DL (ref 6.1–8)
TOXIC GRANULATION: PRESENT
WBC # BLD: 4 THOU/MM3 (ref 4.8–10.8)

## 2022-11-05 PROCEDURE — 6360000002 HC RX W HCPCS: Performed by: STUDENT IN AN ORGANIZED HEALTH CARE EDUCATION/TRAINING PROGRAM

## 2022-11-05 PROCEDURE — 6360000002 HC RX W HCPCS: Performed by: NURSE PRACTITIONER

## 2022-11-05 PROCEDURE — 80053 COMPREHEN METABOLIC PANEL: CPT

## 2022-11-05 PROCEDURE — 6360000002 HC RX W HCPCS: Performed by: PHYSICIAN ASSISTANT

## 2022-11-05 PROCEDURE — 6370000000 HC RX 637 (ALT 250 FOR IP): Performed by: STUDENT IN AN ORGANIZED HEALTH CARE EDUCATION/TRAINING PROGRAM

## 2022-11-05 PROCEDURE — 36415 COLL VENOUS BLD VENIPUNCTURE: CPT

## 2022-11-05 PROCEDURE — 2500000003 HC RX 250 WO HCPCS

## 2022-11-05 PROCEDURE — 2580000003 HC RX 258: Performed by: STUDENT IN AN ORGANIZED HEALTH CARE EDUCATION/TRAINING PROGRAM

## 2022-11-05 PROCEDURE — 83735 ASSAY OF MAGNESIUM: CPT

## 2022-11-05 PROCEDURE — 2580000003 HC RX 258

## 2022-11-05 PROCEDURE — 6370000000 HC RX 637 (ALT 250 FOR IP): Performed by: FAMILY MEDICINE

## 2022-11-05 PROCEDURE — 2580000003 HC RX 258: Performed by: NURSE PRACTITIONER

## 2022-11-05 PROCEDURE — 85025 COMPLETE CBC W/AUTO DIFF WBC: CPT

## 2022-11-05 PROCEDURE — 6370000000 HC RX 637 (ALT 250 FOR IP): Performed by: INTERNAL MEDICINE

## 2022-11-05 PROCEDURE — 6370000000 HC RX 637 (ALT 250 FOR IP): Performed by: PSYCHIATRY & NEUROLOGY

## 2022-11-05 PROCEDURE — 2060000000 HC ICU INTERMEDIATE R&B

## 2022-11-05 PROCEDURE — 71045 X-RAY EXAM CHEST 1 VIEW: CPT

## 2022-11-05 RX ORDER — RISPERIDONE 1 MG/1
1 TABLET ORAL NIGHTLY
Status: DISCONTINUED | OUTPATIENT
Start: 2022-11-05 | End: 2022-11-11 | Stop reason: HOSPADM

## 2022-11-05 RX ADMIN — Medication 100 MG: at 08:43

## 2022-11-05 RX ADMIN — FOLIC ACID 1 MG: 5 INJECTION, SOLUTION INTRAMUSCULAR; INTRAVENOUS; SUBCUTANEOUS at 11:09

## 2022-11-05 RX ADMIN — AMLODIPINE BESYLATE 10 MG: 10 TABLET ORAL at 08:43

## 2022-11-05 RX ADMIN — RISPERIDONE 0.5 MG: 1 TABLET ORAL at 08:43

## 2022-11-05 RX ADMIN — MIRTAZAPINE 15 MG: 15 TABLET, FILM COATED ORAL at 20:27

## 2022-11-05 RX ADMIN — Medication 1 TABLET: at 12:50

## 2022-11-05 RX ADMIN — CEFTRIAXONE 2000 MG: 2 INJECTION, POWDER, FOR SOLUTION INTRAMUSCULAR; INTRAVENOUS at 00:44

## 2022-11-05 RX ADMIN — PANTOPRAZOLE SODIUM 40 MG: 40 TABLET, DELAYED RELEASE ORAL at 08:43

## 2022-11-05 RX ADMIN — SODIUM CHLORIDE, PRESERVATIVE FREE 10 ML: 5 INJECTION INTRAVENOUS at 20:27

## 2022-11-05 RX ADMIN — RISPERIDONE 1 MG: 1 TABLET ORAL at 20:27

## 2022-11-05 RX ADMIN — POTASSIUM CHLORIDE 20 MEQ: 1500 TABLET, EXTENDED RELEASE ORAL at 12:51

## 2022-11-05 RX ADMIN — CARVEDILOL 6.25 MG: 6.25 TABLET, FILM COATED ORAL at 08:43

## 2022-11-05 RX ADMIN — PANTOPRAZOLE SODIUM 40 MG: 40 TABLET, DELAYED RELEASE ORAL at 20:28

## 2022-11-05 RX ADMIN — ENOXAPARIN SODIUM 40 MG: 100 INJECTION SUBCUTANEOUS at 08:42

## 2022-11-05 RX ADMIN — LORAZEPAM 0.5 MG: 0.5 TABLET ORAL at 16:54

## 2022-11-05 RX ADMIN — POTASSIUM CHLORIDE 20 MEQ: 1500 TABLET, EXTENDED RELEASE ORAL at 16:54

## 2022-11-05 RX ADMIN — Medication 3 MG: at 20:28

## 2022-11-05 RX ADMIN — CARVEDILOL 6.25 MG: 6.25 TABLET, FILM COATED ORAL at 20:28

## 2022-11-05 RX ADMIN — POTASSIUM CHLORIDE 20 MEQ: 1500 TABLET, EXTENDED RELEASE ORAL at 08:43

## 2022-11-05 RX ADMIN — SODIUM CHLORIDE, PRESERVATIVE FREE 10 ML: 5 INJECTION INTRAVENOUS at 08:43

## 2022-11-05 RX ADMIN — HALOPERIDOL LACTATE 5 MG: 5 INJECTION, SOLUTION INTRAMUSCULAR at 12:14

## 2022-11-05 RX ADMIN — LORAZEPAM 0.5 MG: 0.5 TABLET ORAL at 08:42

## 2022-11-05 ASSESSMENT — PAIN SCALES - GENERAL: PAINLEVEL_OUTOF10: 0

## 2022-11-05 NOTE — PLAN OF CARE
Problem: Discharge Planning  Goal: Discharge to home or other facility with appropriate resources  Outcome: Progressing     Problem: Confusion  Goal: Confusion, delirium, dementia, or psychosis is improved or at baseline  Outcome: Progressing     Problem: Skin/Tissue Integrity  Goal: Absence of new skin breakdown  Outcome: Progressing     Problem: ABCDS Injury Assessment  Goal: Absence of physical injury  Outcome: Progressing     Problem: Safety - Adult  Goal: Free from fall injury  Outcome: Progressing     Problem: Pain  Goal: Verbalizes/displays adequate comfort level or baseline comfort level  Outcome: Progressing

## 2022-11-05 NOTE — FLOWSHEET NOTE
11/05/22 1758   Safe Environment   Safety Measures Other (comment)  (virtual safety round complete)   Virtual RN rounds, eyes closed, resp unlabored. Sitter at bedside.

## 2022-11-05 NOTE — PROGRESS NOTES
Daily Progress Note  Jacquelin Diana MD  11/5/2022    Reviewed patient's current plan of care and vital signs with nursing staff. Patient is seen with sitter. According to staff, patient is sleeping on and off during the day with minor agitation. SUBJECTIVE:    Patient is feeling better. No suicidal ideation. ROS: Patient has new complaints:  No  Sleeping adequately:  Yes      Mental Status Examination:  Patient is unarousable. Speech: Mumbled. No abnormal movements, tics or mannerisms. Mood dysthymic; affect restricted. Suicidal ideation Absent. Homicidal ideations Absent. Hallucinations Absent. Delusions Absent. Thought Content: Not able to assess. Thought Processes: Not able to assess. Alert and oriented X 1 apparently to person only. Attention and concentration impaired. MEMORY not tested. Insight and Judgement impaired judgment.       Medications  Current Facility-Administered Medications: risperiDONE (RISPERDAL) tablet 1 mg, 1 mg, Oral, Nightly  potassium chloride (KLOR-CON M) extended release tablet 20 mEq, 20 mEq, Oral, TID WC  haloperidol lactate (HALDOL) injection 5 mg, 5 mg, IntraMUSCular, Q6H PRN  mirtazapine (REMERON) tablet 15 mg, 15 mg, Oral, Nightly  risperiDONE (RISPERDAL) tablet 0.5 mg, 0.5 mg, Oral, BID  multivitamin 1 tablet, 1 tablet, Oral, Daily  nicotine (NICODERM CQ) 21 MG/24HR 1 patch, 1 patch, TransDERmal, Q24H  pantoprazole (PROTONIX) tablet 40 mg, 40 mg, Oral, BID  LORazepam (ATIVAN) tablet 0.5 mg, 0.5 mg, Oral, TID PRN  amLODIPine (NORVASC) tablet 10 mg, 10 mg, Oral, Daily  carvedilol (COREG) tablet 6.25 mg, 6.25 mg, Oral, BID  [Held by provider] hydrALAZINE (APRESOLINE) tablet 50 mg, 50 mg, Oral, Q8H  melatonin tablet 3 mg, 3 mg, Oral, Nightly  thiamine tablet 100 mg, 100 mg, Oral, Daily  cefTRIAXone (ROCEPHIN) 2,000 mg in dextrose 5 % 50 mL IVPB mini-bag, 2,000 mg, IntraVENous, Q24H  potassium chloride (KLOR-CON M) extended release tablet 40 mEq, 40 mEq, Oral, PRN **OR** potassium bicarb-citric acid (EFFER-K) effervescent tablet 40 mEq, 40 mEq, Oral, PRN **OR** potassium chloride 10 mEq/100 mL IVPB (Peripheral Line), 10 mEq, IntraVENous, PRN  [Held by provider] hydrALAZINE (APRESOLINE) injection 5 mg, 5 mg, IntraVENous, Q6H PRN  [Held by provider] metoprolol (LOPRESSOR) injection 5 mg, 5 mg, IntraVENous, R7W PRN  folic acid 1 mg in sodium chloride 0.9 % 50 mL IVPB, 1 mg, IntraVENous, Daily  sodium chloride flush 0.9 % injection 5-40 mL, 5-40 mL, IntraVENous, 2 times per day  sodium chloride flush 0.9 % injection 5-40 mL, 5-40 mL, IntraVENous, PRN  0.9 % sodium chloride infusion, , IntraVENous, PRN  enoxaparin (LOVENOX) injection 40 mg, 40 mg, SubCUTAneous, Daily  ondansetron (ZOFRAN-ODT) disintegrating tablet 4 mg, 4 mg, Oral, Q8H PRN **OR** ondansetron (ZOFRAN) injection 4 mg, 4 mg, IntraVENous, Q6H PRN  polyethylene glycol (GLYCOLAX) packet 17 g, 17 g, Oral, Daily PRN  acetaminophen (TYLENOL) tablet 650 mg, 650 mg, Oral, Q6H PRN **OR** acetaminophen (TYLENOL) suppository 650 mg, 650 mg, Rectal, Q6H PRN    ASSESSMENT AND PLAN  Patient's symptoms are same or worse  Continue with current psychotropics, consider increasing risperidone. Attempt to develop insight  Psycho-education conducted. Supportive Therapy conducted.
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
## 2022-11-06 ENCOUNTER — APPOINTMENT (OUTPATIENT)
Dept: GENERAL RADIOLOGY | Age: 67
DRG: 640 | End: 2022-11-06
Payer: MEDICARE

## 2022-11-06 LAB
ALBUMIN SERPL-MCNC: 2.3 G/DL (ref 3.5–5.1)
ALP BLD-CCNC: 122 U/L (ref 38–126)
ALT SERPL-CCNC: 31 U/L (ref 11–66)
ANION GAP SERPL CALCULATED.3IONS-SCNC: 10 MEQ/L (ref 8–16)
AST SERPL-CCNC: 43 U/L (ref 5–40)
BASOPHILIA: ABNORMAL
BASOPHILS # BLD: 0.3 %
BASOPHILS ABSOLUTE: 0 THOU/MM3 (ref 0–0.1)
BILIRUB SERPL-MCNC: 0.4 MG/DL (ref 0.3–1.2)
BUN BLDV-MCNC: 12 MG/DL (ref 7–22)
CALCIUM SERPL-MCNC: 8.5 MG/DL (ref 8.5–10.5)
CHLORIDE BLD-SCNC: 113 MEQ/L (ref 98–111)
CO2: 17 MEQ/L (ref 23–33)
CREAT SERPL-MCNC: 0.9 MG/DL (ref 0.4–1.2)
EOSINOPHIL # BLD: 3.8 %
EOSINOPHILS ABSOLUTE: 0.1 THOU/MM3 (ref 0–0.4)
ERYTHROCYTE [DISTWIDTH] IN BLOOD BY AUTOMATED COUNT: 16.5 % (ref 11.5–14.5)
ERYTHROCYTE [DISTWIDTH] IN BLOOD BY AUTOMATED COUNT: 52.2 FL (ref 35–45)
GFR SERPL CREATININE-BSD FRML MDRD: > 60 ML/MIN/1.73M2
GLUCOSE BLD-MCNC: 109 MG/DL (ref 70–108)
HCT VFR BLD CALC: 25 % (ref 42–52)
HEMOGLOBIN: 7.7 GM/DL (ref 14–18)
IMMATURE GRANS (ABS): 0.02 THOU/MM3 (ref 0–0.07)
IMMATURE GRANULOCYTES: 0.6 %
LYMPHOCYTES # BLD: 22.4 %
LYMPHOCYTES ABSOLUTE: 0.7 THOU/MM3 (ref 1–4.8)
MCH RBC QN AUTO: 26.9 PG (ref 26–33)
MCHC RBC AUTO-ENTMCNC: 30.8 GM/DL (ref 32.2–35.5)
MCV RBC AUTO: 87.4 FL (ref 80–94)
MONOCYTES # BLD: 11.5 %
MONOCYTES ABSOLUTE: 0.4 THOU/MM3 (ref 0.4–1.3)
NUCLEATED RED BLOOD CELLS: 0 /100 WBC
PLATELET # BLD: 79 THOU/MM3 (ref 130–400)
PLATELET ESTIMATE: ABNORMAL
PMV BLD AUTO: 11.4 FL (ref 9.4–12.4)
POTASSIUM SERPL-SCNC: 4.4 MEQ/L (ref 3.5–5.2)
RBC # BLD: 2.86 MILL/MM3 (ref 4.7–6.1)
SCAN OF BLOOD SMEAR: NORMAL
SEG NEUTROPHILS: 61.4 %
SEGMENTED NEUTROPHILS ABSOLUTE COUNT: 1.9 THOU/MM3 (ref 1.8–7.7)
SODIUM BLD-SCNC: 140 MEQ/L (ref 135–145)
TOTAL PROTEIN: 5.6 G/DL (ref 6.1–8)
TOXIC GRANULATION: PRESENT
WBC # BLD: 3.1 THOU/MM3 (ref 4.8–10.8)

## 2022-11-06 PROCEDURE — 6370000000 HC RX 637 (ALT 250 FOR IP): Performed by: INTERNAL MEDICINE

## 2022-11-06 PROCEDURE — 71045 X-RAY EXAM CHEST 1 VIEW: CPT

## 2022-11-06 PROCEDURE — 2060000000 HC ICU INTERMEDIATE R&B

## 2022-11-06 PROCEDURE — 6360000002 HC RX W HCPCS: Performed by: STUDENT IN AN ORGANIZED HEALTH CARE EDUCATION/TRAINING PROGRAM

## 2022-11-06 PROCEDURE — 2580000003 HC RX 258

## 2022-11-06 PROCEDURE — 6370000000 HC RX 637 (ALT 250 FOR IP): Performed by: FAMILY MEDICINE

## 2022-11-06 PROCEDURE — 85025 COMPLETE CBC W/AUTO DIFF WBC: CPT

## 2022-11-06 PROCEDURE — 6370000000 HC RX 637 (ALT 250 FOR IP): Performed by: NURSE PRACTITIONER

## 2022-11-06 PROCEDURE — 6370000000 HC RX 637 (ALT 250 FOR IP): Performed by: PSYCHIATRY & NEUROLOGY

## 2022-11-06 PROCEDURE — 2500000003 HC RX 250 WO HCPCS

## 2022-11-06 PROCEDURE — 2580000003 HC RX 258: Performed by: STUDENT IN AN ORGANIZED HEALTH CARE EDUCATION/TRAINING PROGRAM

## 2022-11-06 PROCEDURE — 6370000000 HC RX 637 (ALT 250 FOR IP): Performed by: STUDENT IN AN ORGANIZED HEALTH CARE EDUCATION/TRAINING PROGRAM

## 2022-11-06 PROCEDURE — 2580000003 HC RX 258: Performed by: NURSE PRACTITIONER

## 2022-11-06 PROCEDURE — 80053 COMPREHEN METABOLIC PANEL: CPT

## 2022-11-06 PROCEDURE — 6360000002 HC RX W HCPCS: Performed by: NURSE PRACTITIONER

## 2022-11-06 PROCEDURE — 36415 COLL VENOUS BLD VENIPUNCTURE: CPT

## 2022-11-06 RX ORDER — TRAZODONE HYDROCHLORIDE 50 MG/1
50 TABLET ORAL NIGHTLY PRN
Status: DISCONTINUED | OUTPATIENT
Start: 2022-11-06 | End: 2022-11-11 | Stop reason: HOSPADM

## 2022-11-06 RX ADMIN — POLYETHYLENE GLYCOL 3350 17 G: 17 POWDER, FOR SOLUTION ORAL at 09:30

## 2022-11-06 RX ADMIN — POTASSIUM CHLORIDE 20 MEQ: 1500 TABLET, EXTENDED RELEASE ORAL at 17:05

## 2022-11-06 RX ADMIN — RISPERIDONE 0.5 MG: 1 TABLET ORAL at 09:30

## 2022-11-06 RX ADMIN — Medication 3 MG: at 20:38

## 2022-11-06 RX ADMIN — PANTOPRAZOLE SODIUM 40 MG: 40 TABLET, DELAYED RELEASE ORAL at 09:35

## 2022-11-06 RX ADMIN — CARVEDILOL 6.25 MG: 6.25 TABLET, FILM COATED ORAL at 20:42

## 2022-11-06 RX ADMIN — CARVEDILOL 6.25 MG: 6.25 TABLET, FILM COATED ORAL at 09:30

## 2022-11-06 RX ADMIN — PANTOPRAZOLE SODIUM 40 MG: 40 TABLET, DELAYED RELEASE ORAL at 20:47

## 2022-11-06 RX ADMIN — TRAZODONE HYDROCHLORIDE 50 MG: 50 TABLET ORAL at 20:39

## 2022-11-06 RX ADMIN — SODIUM CHLORIDE, PRESERVATIVE FREE 10 ML: 5 INJECTION INTRAVENOUS at 09:30

## 2022-11-06 RX ADMIN — ENOXAPARIN SODIUM 40 MG: 100 INJECTION SUBCUTANEOUS at 09:30

## 2022-11-06 RX ADMIN — RISPERIDONE 1 MG: 1 TABLET ORAL at 20:39

## 2022-11-06 RX ADMIN — POTASSIUM CHLORIDE 20 MEQ: 1500 TABLET, EXTENDED RELEASE ORAL at 09:30

## 2022-11-06 RX ADMIN — FOLIC ACID 1 MG: 5 INJECTION, SOLUTION INTRAMUSCULAR; INTRAVENOUS; SUBCUTANEOUS at 09:41

## 2022-11-06 RX ADMIN — SODIUM CHLORIDE, PRESERVATIVE FREE 10 ML: 5 INJECTION INTRAVENOUS at 20:38

## 2022-11-06 RX ADMIN — ACETAMINOPHEN 650 MG: 325 TABLET ORAL at 17:05

## 2022-11-06 RX ADMIN — Medication 1 TABLET: at 13:45

## 2022-11-06 RX ADMIN — MIRTAZAPINE 15 MG: 15 TABLET, FILM COATED ORAL at 20:38

## 2022-11-06 RX ADMIN — Medication 100 MG: at 09:30

## 2022-11-06 RX ADMIN — CEFTRIAXONE 2000 MG: 2 INJECTION, POWDER, FOR SOLUTION INTRAMUSCULAR; INTRAVENOUS at 01:04

## 2022-11-06 RX ADMIN — POTASSIUM CHLORIDE 20 MEQ: 1500 TABLET, EXTENDED RELEASE ORAL at 13:45

## 2022-11-06 RX ADMIN — AMLODIPINE BESYLATE 10 MG: 10 TABLET ORAL at 09:30

## 2022-11-06 RX ADMIN — RISPERIDONE 0.5 MG: 1 TABLET ORAL at 20:41

## 2022-11-06 ASSESSMENT — PAIN SCALES - GENERAL
PAINLEVEL_OUTOF10: 0

## 2022-11-06 NOTE — FLOWSHEET NOTE
11/06/22 1818   Safe Environment   Safety Measures Other (comment)  (virtual safety round complete)   Virtual RN rounds, staff at bedside with patient
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
## 2022-11-06 NOTE — PROGRESS NOTES
Daily Progress Note  Simone Torres MD  11/6/2022    Reviewed patient's current plan of care and vital signs with nursing staff. Patient is seen with sitter. He is still restless at times; he did not sleep well last night. Risperidone was increased yesterday. He is able to carry a decent conversation. SUBJECTIVE:    Patient is feeling better. No suicidal ideation. ROS: Patient has new complaints:  No  Sleeping adequately:  Yes      Mental Status Examination:  Patient is cooperative. Speech: Normal rate and tone. No abnormal movements, tics or mannerisms. Mood euthymic; affect restricted. Suicidal ideation Absent. Homicidal ideations Absent. Hallucinations Absent. Delusions Absent. Thought Content: abnormal. Thought Processes: More appropriate. Alert and oriented X 2, partially to time. Attention and concentration impaired. MEMORY not tested. Insight and Judgement impaired judgment.       Medications  Current Facility-Administered Medications: risperiDONE (RISPERDAL) tablet 1 mg, 1 mg, Oral, Nightly  potassium chloride (KLOR-CON M) extended release tablet 20 mEq, 20 mEq, Oral, TID WC  haloperidol lactate (HALDOL) injection 5 mg, 5 mg, IntraMUSCular, Q6H PRN  mirtazapine (REMERON) tablet 15 mg, 15 mg, Oral, Nightly  risperiDONE (RISPERDAL) tablet 0.5 mg, 0.5 mg, Oral, BID  multivitamin 1 tablet, 1 tablet, Oral, Daily  nicotine (NICODERM CQ) 21 MG/24HR 1 patch, 1 patch, TransDERmal, Q24H  pantoprazole (PROTONIX) tablet 40 mg, 40 mg, Oral, BID  LORazepam (ATIVAN) tablet 0.5 mg, 0.5 mg, Oral, TID PRN  amLODIPine (NORVASC) tablet 10 mg, 10 mg, Oral, Daily  carvedilol (COREG) tablet 6.25 mg, 6.25 mg, Oral, BID  [Held by provider] hydrALAZINE (APRESOLINE) tablet 50 mg, 50 mg, Oral, Q8H  melatonin tablet 3 mg, 3 mg, Oral, Nightly  thiamine tablet 100 mg, 100 mg, Oral, Daily  cefTRIAXone (ROCEPHIN) 2,000 mg in dextrose 5 % 50 mL IVPB mini-bag, 2,000 mg, IntraVENous, Q24H  potassium chloride (KLOR-CON M) extended release tablet 40 mEq, 40 mEq, Oral, PRN **OR** potassium bicarb-citric acid (EFFER-K) effervescent tablet 40 mEq, 40 mEq, Oral, PRN **OR** potassium chloride 10 mEq/100 mL IVPB (Peripheral Line), 10 mEq, IntraVENous, PRN  [Held by provider] hydrALAZINE (APRESOLINE) injection 5 mg, 5 mg, IntraVENous, Q6H PRN  [Held by provider] metoprolol (LOPRESSOR) injection 5 mg, 5 mg, IntraVENous, Q2Z PRN  folic acid 1 mg in sodium chloride 0.9 % 50 mL IVPB, 1 mg, IntraVENous, Daily  sodium chloride flush 0.9 % injection 5-40 mL, 5-40 mL, IntraVENous, 2 times per day  sodium chloride flush 0.9 % injection 5-40 mL, 5-40 mL, IntraVENous, PRN  0.9 % sodium chloride infusion, , IntraVENous, PRN  enoxaparin (LOVENOX) injection 40 mg, 40 mg, SubCUTAneous, Daily  ondansetron (ZOFRAN-ODT) disintegrating tablet 4 mg, 4 mg, Oral, Q8H PRN **OR** ondansetron (ZOFRAN) injection 4 mg, 4 mg, IntraVENous, Q6H PRN  polyethylene glycol (GLYCOLAX) packet 17 g, 17 g, Oral, Daily PRN  acetaminophen (TYLENOL) tablet 650 mg, 650 mg, Oral, Q6H PRN **OR** acetaminophen (TYLENOL) suppository 650 mg, 650 mg, Rectal, Q6H PRN    ASSESSMENT AND PLAN  Patient's symptoms are improving some  Continue with current psychotropics, add Trazodone prn. Attempt to develop insight  Psycho-education conducted. Supportive Therapy conducted.

## 2022-11-06 NOTE — PLAN OF CARE
Problem: Discharge Planning  Goal: Discharge to home or other facility with appropriate resources  Outcome: Progressing  Flowsheets (Taken 11/6/2022 0756)  Discharge to home or other facility with appropriate resources:   Identify barriers to discharge with patient and caregiver   Arrange for needed discharge resources and transportation as appropriate   Identify discharge learning needs (meds, wound care, etc)   Refer to discharge planning if patient needs post-hospital services based on physician order or complex needs related to functional status, cognitive ability or social support system     Problem: Confusion  Goal: Confusion, delirium, dementia, or psychosis is improved or at baseline  Description: INTERVENTIONS:  1. Assess for possible contributors to thought disturbance, including medications, impaired vision or hearing, underlying metabolic abnormalities, dehydration, psychiatric diagnoses, and notify attending LIP  2. [place] high risk fall precautions, as indicated  3. Provide frequent short contacts to provide reality reorientation, refocusing and direction  4. Decrease environmental stimuli, including noise as appropriate  5. Monitor and intervene to maintain adequate nutrition, hydration, elimination, sleep and activity  6. If unable to ensure safety without constant attention obtain sitter and review sitter guidelines with assigned personnel  7.  Initiate Psychosocial CNS and Spiritual Care consult, as indicated  Outcome: Progressing  Flowsheets (Taken 11/6/2022 0756)  Effect of thought disturbance (confusion, delirium, dementia, or psychosis) are managed with adequate functional status:   Assess for contributors to thought disturbance, including medications, impaired vision or hearing, underlying metabolic abnormalities, dehydration, psychiatric diagnoses, notify [place] high risk fall precautions, as indicated   Provide frequent short contacts to provide reality reorientation, refocusing and direction   Decrease environmental stimuli, including noise as appropriate   Monitor and intervene to maintain adequate nutrition, hydration, elimination, sleep and activity     Problem: Skin/Tissue Integrity  Goal: Absence of new skin breakdown  Description: 1. Monitor for areas of redness and/or skin breakdown  2. Assess vascular access sites hourly  3. Every 4-6 hours minimum:  Change oxygen saturation probe site  4. Every 4-6 hours:  If on nasal continuous positive airway pressure, respiratory therapy assess nares and determine need for appliance change or resting period. Outcome: Progressing     Problem: ABCDS Injury Assessment  Goal: Absence of physical injury  Outcome: Progressing  Flowsheets (Taken 11/6/2022 1203)  Absence of Physical Injury: Implement safety measures based on patient assessment     Problem: Safety - Adult  Goal: Free from fall injury  Outcome: Progressing  Flowsheets (Taken 11/6/2022 1203)  Free From Fall Injury: Instruct family/caregiver on patient safety     Problem: Pain  Goal: Verbalizes/displays adequate comfort level or baseline comfort level  Outcome: Progressing  Flowsheets (Taken 11/6/2022 0756)  Verbalizes/displays adequate comfort level or baseline comfort level:   Encourage patient to monitor pain and request assistance   Assess pain using appropriate pain scale   Administer analgesics based on type and severity of pain and evaluate response   Implement non-pharmacological measures as appropriate and evaluate response     Care plan reviewed with patient and family. Patient and family verbalize understanding of the plan of care and contribute to goal setting.

## 2022-11-06 NOTE — FLOWSHEET NOTE
11/06/22 1023   Safe Environment   Safety Measures Other (comment)  (virtual safety round complete)   Virtual RN rounds, pt sleeping, resp unlabored.  Sitter at bedside

## 2022-11-07 PROCEDURE — 92526 ORAL FUNCTION THERAPY: CPT

## 2022-11-07 PROCEDURE — 6370000000 HC RX 637 (ALT 250 FOR IP): Performed by: PSYCHIATRY & NEUROLOGY

## 2022-11-07 PROCEDURE — 2500000003 HC RX 250 WO HCPCS

## 2022-11-07 PROCEDURE — 2580000003 HC RX 258: Performed by: NURSE PRACTITIONER

## 2022-11-07 PROCEDURE — 99232 SBSQ HOSP IP/OBS MODERATE 35: CPT | Performed by: INTERNAL MEDICINE

## 2022-11-07 PROCEDURE — 97129 THER IVNTJ 1ST 15 MIN: CPT

## 2022-11-07 PROCEDURE — 6370000000 HC RX 637 (ALT 250 FOR IP): Performed by: INTERNAL MEDICINE

## 2022-11-07 PROCEDURE — 6370000000 HC RX 637 (ALT 250 FOR IP): Performed by: FAMILY MEDICINE

## 2022-11-07 PROCEDURE — 6360000002 HC RX W HCPCS: Performed by: STUDENT IN AN ORGANIZED HEALTH CARE EDUCATION/TRAINING PROGRAM

## 2022-11-07 PROCEDURE — 6370000000 HC RX 637 (ALT 250 FOR IP): Performed by: STUDENT IN AN ORGANIZED HEALTH CARE EDUCATION/TRAINING PROGRAM

## 2022-11-07 PROCEDURE — 97535 SELF CARE MNGMENT TRAINING: CPT

## 2022-11-07 PROCEDURE — 2580000003 HC RX 258

## 2022-11-07 PROCEDURE — 1200000000 HC SEMI PRIVATE

## 2022-11-07 PROCEDURE — 6360000002 HC RX W HCPCS: Performed by: NURSE PRACTITIONER

## 2022-11-07 PROCEDURE — 97530 THERAPEUTIC ACTIVITIES: CPT

## 2022-11-07 PROCEDURE — 2580000003 HC RX 258: Performed by: STUDENT IN AN ORGANIZED HEALTH CARE EDUCATION/TRAINING PROGRAM

## 2022-11-07 RX ORDER — AMOXICILLIN AND CLAVULANATE POTASSIUM 875; 125 MG/1; MG/1
1 TABLET, FILM COATED ORAL EVERY 12 HOURS SCHEDULED
Status: COMPLETED | OUTPATIENT
Start: 2022-11-07 | End: 2022-11-09

## 2022-11-07 RX ORDER — FOLIC ACID 1 MG/1
1 TABLET ORAL DAILY
Status: DISCONTINUED | OUTPATIENT
Start: 2022-11-07 | End: 2022-11-11 | Stop reason: HOSPADM

## 2022-11-07 RX ADMIN — TRAZODONE HYDROCHLORIDE 50 MG: 50 TABLET ORAL at 20:17

## 2022-11-07 RX ADMIN — PANTOPRAZOLE SODIUM 40 MG: 40 TABLET, DELAYED RELEASE ORAL at 09:14

## 2022-11-07 RX ADMIN — POTASSIUM CHLORIDE 20 MEQ: 1500 TABLET, EXTENDED RELEASE ORAL at 09:14

## 2022-11-07 RX ADMIN — Medication 100 MG: at 09:14

## 2022-11-07 RX ADMIN — RISPERIDONE 1 MG: 1 TABLET ORAL at 20:17

## 2022-11-07 RX ADMIN — CARVEDILOL 6.25 MG: 6.25 TABLET, FILM COATED ORAL at 09:15

## 2022-11-07 RX ADMIN — AMLODIPINE BESYLATE 10 MG: 10 TABLET ORAL at 09:15

## 2022-11-07 RX ADMIN — AMOXICILLIN AND CLAVULANATE POTASSIUM 1 TABLET: 875; 125 TABLET, FILM COATED ORAL at 20:18

## 2022-11-07 RX ADMIN — FOLIC ACID 1 MG: 5 INJECTION, SOLUTION INTRAMUSCULAR; INTRAVENOUS; SUBCUTANEOUS at 09:28

## 2022-11-07 RX ADMIN — Medication 3 MG: at 20:17

## 2022-11-07 RX ADMIN — CARVEDILOL 6.25 MG: 6.25 TABLET, FILM COATED ORAL at 20:16

## 2022-11-07 RX ADMIN — RISPERIDONE 0.5 MG: 1 TABLET ORAL at 20:17

## 2022-11-07 RX ADMIN — ENOXAPARIN SODIUM 40 MG: 100 INJECTION SUBCUTANEOUS at 09:14

## 2022-11-07 RX ADMIN — RISPERIDONE 0.5 MG: 1 TABLET ORAL at 09:14

## 2022-11-07 RX ADMIN — POTASSIUM CHLORIDE 20 MEQ: 1500 TABLET, EXTENDED RELEASE ORAL at 12:17

## 2022-11-07 RX ADMIN — Medication 1 TABLET: at 12:17

## 2022-11-07 RX ADMIN — SODIUM CHLORIDE, PRESERVATIVE FREE 10 ML: 5 INJECTION INTRAVENOUS at 09:13

## 2022-11-07 RX ADMIN — CEFTRIAXONE 2000 MG: 2 INJECTION, POWDER, FOR SOLUTION INTRAMUSCULAR; INTRAVENOUS at 00:17

## 2022-11-07 RX ADMIN — FOLIC ACID 1 MG: 1 TABLET ORAL at 12:35

## 2022-11-07 RX ADMIN — PANTOPRAZOLE SODIUM 40 MG: 40 TABLET, DELAYED RELEASE ORAL at 20:17

## 2022-11-07 RX ADMIN — MIRTAZAPINE 15 MG: 15 TABLET, FILM COATED ORAL at 20:16

## 2022-11-07 RX ADMIN — POTASSIUM CHLORIDE 20 MEQ: 1500 TABLET, EXTENDED RELEASE ORAL at 18:06

## 2022-11-07 NOTE — PROGRESS NOTES
[facility]   Nephrology progress note  11/7/2022, 10:07 AM      Pt Name:    Fabricio Moon  MRN:     [id number]     YOB: 1955  Admit Date:    10/29/2022  7:54 AM    Chief Complaint: Nephrology following for hypernatremia, HENRY. Subjective:  Patient was seen and examined this morning. No overnight events noted. Objective:  24HR INTAKE/OUTPUT:    Intake/Output Summary (Last 24 hours) at 11/7/2022 1007  Last data filed at 11/7/2022 0815  Gross per 24 hour   Intake 1809.98 ml   Output 550 ml   Net 1259.98 ml         I/O last 3 completed shifts: In: 2060 [P.O.:1100;  I.V.:10; IV Piggyback:950]  Out: 550 [Urine:550]  I/O this shift:  In: 360 [P.O.:360]  Out: 200 [Urine:200]   Admission weight: 189 lb 12.8 oz (86.1 kg)  Wt Readings from Last 3 Encounters:   11/04/22 191 lb (86.6 kg)   01/19/22 184 lb (83.5 kg)   01/16/22 180 lb (81.6 kg)        Vitals :   Vitals:    11/06/22 2042 11/07/22 0019 11/07/22 0430 11/07/22 0900   BP: 127/61 136/65 123/71 110/66   Pulse: 79 77 78 74   Resp: 20 18 16 20   Temp: 99 °F (37.2 °C) 99 °F (37.2 °C) 97.9 °F (36.6 °C) 97.5 °F (36.4 °C)   TempSrc: Oral Axillary Oral Oral   SpO2: 97% 95% 100% 97%   Weight:       Height:           Physical examination  General Appearance: awake,appears calm  Mouth/Throat: Oral mucosa moist  Neck: No JVD  Lungs: Air entry B/L, no rales, no use of accessory muscles  Heart:  S1, S2 heard  GI: soft, non-tender, no guarding  Extremities: no LE edema    Medications:  Infusion:    sodium chloride       Meds:    risperiDONE  1 mg Oral Nightly    potassium chloride  20 mEq Oral TID WC    mirtazapine  15 mg Oral Nightly    risperiDONE  0.5 mg Oral BID    multivitamin  1 tablet Oral Daily    nicotine  1 patch TransDERmal Q24H    pantoprazole  40 mg Oral BID    amLODIPine  10 mg Oral Daily    carvedilol  6.25 mg Oral BID    [Held by provider] hydrALAZINE  50 mg Oral Q8H    melatonin  3 mg Oral Nightly    vitamin B-1  100 mg Oral Daily    cefTRIAXone (ROCEPHIN) IV  2,000 mg IntraVENous A24G    folic acid IVPB  1 mg IntraVENous Daily    sodium chloride flush  5-40 mL IntraVENous 2 times per day    enoxaparin  40 mg SubCUTAneous Daily     Meds prn: traZODone, haloperidol lactate, LORazepam, potassium chloride **OR** potassium alternative oral replacement **OR** potassium chloride, [Held by provider] hydrALAZINE, [Held by provider] metoprolol, sodium chloride flush, sodium chloride, ondansetron **OR** ondansetron, polyethylene glycol, acetaminophen **OR** acetaminophen     Lab Data :  CBC:   Recent Labs     11/05/22  0352 11/06/22  0450   WBC 4.0* 3.1*   HGB 7.7* 7.7*   HCT 25.2* 25.0*   PLT 68* 79*     CMP:  Recent Labs     11/05/22  0352 11/06/22  0450    140   K 3.7 4.4    113*   CO2 16* 17*   BUN 12 12   CREATININE 1.0 0.9   GLUCOSE 143* 109*   CALCIUM 8.1* 8.5   MG 1.8  --      Hepatic:   Recent Labs     11/05/22  0352 11/06/22  0450   LABALBU 2.4* 2.3*   AST 41* 43*   ALT 30 31   BILITOT 0.4 0.4   ALKPHOS 120 122         Assessment and Plan:  Hypernatremia from volume depletion. resolved  HENRY from volume depletion, improved  Hypokalemia: improved  Acid/Base: low serum bicarb but blood gas is showing primary respiratory alkalosis. pH is 7.43.  does not need bicarb  Altered mental status  Pancytopenia  Pneumonia  Hx alcohol use    Hypernatremia resolved. HENRY resolved. Hypokalemia resolved. Will will sign off. Please call if needed. D/W patient  and hospitalist resident. [id number] [address] Villela DO  [facility]    This report has been created using voice recognition software.  It may contain minor errors which are inherent in voice recognition technology

## 2022-11-07 NOTE — PROGRESS NOTES
[facility] ICU STEPDOWN TELEMETRY 4K  Occupational Therapy  Daily Note  Time:   Time In:   Time Out: 1003  Timed Code Treatment Minutes: 38 Minutes  Minutes: 38          Date: 2022  Patient Name: Julian Taylor,   Gender: male      Room: [facility]27/027-A  MRN: [id number]  : 1955  (79 y.o.)  Referring Practitioner: Sharon Meza MD  Diagnosis: fall at home  Additional Pertinent Hx: Per ER note on 10/29/2022:67 y.o. male who presents to the Emergency Department for the evaluation of altered mentation. Patient arrives with family who provides much history. He reportedly has a longstanding history of alcoholism. Was admitted at [facility] 1 month ago for GI bleed where he had upper and lower endoscopies. Family reports that when waking from anesthesia for the endoscopies, he had altered mentation and this has persisted since. They were told they believed he had a CVA during the procedure. He was discharged to an [facility] for therapy/rehab due to some residual left-sided weakness that were sequelae from skull fracture and subarachnoid hemorrhage 1 year ago. They report 5 or 6 falls since he went to the ECF, the most recent of which occurred this morning. He has been ambulating independently without use of any assistive devices but was too weak to assist himself and getting up today, prompting his ED arrival. Pt was intubated from 10/29 to 10/30. MRI of brain was negative for acute findings. Restrictions/Precautions:  Restrictions/Precautions: Fall Risk  Position Activity Restriction  Other position/activity restrictions: confusion, high fall risk     SUBJECTIVE: Pt seated in bedside chair upon arrival, agreeable to OT session. PAIN: No c/o.     Vitals: Nurse checked vitals prior to session    COGNITION: Slow Processing, Decreased Recall, Decreased Insight, Impaired Memory, Decreased Problem Solving, Decreased Safety Awareness, Impaired Attention, and pt is confused throughout session. ADL:   Lower Extremity Dressing: Maximum Assistance. MaxA doffing soiled undergarment and threading BLE into undergarment. ModA x1 for balance and modA of another to manage undergarment up over hips. Toileting: Minimal Assistance. For thoroughness of hygiene after BM. Pt completed while seated on STS  Toilet Transfer: Minimal Assistance and X 1. STS . Comment: Pt incontinent of BM ambulating to BR, x2 episodes for toileting and LB dressing this session. BALANCE:  Sitting Balance:  Stand By Assistance. Standing Balance: Minimal Assistance, X 1, X 2.   X1-2 minutes within ADL, multiple trials to complete d/t seated rest breaks    BED MOBILITY:  Not Tested    TRANSFERS:  Sit to Stand:  Minimal Assistance, Moderate Assistance, X 1. From bedside chair  Stand to Sit: Contact Guard Assistance, Maximum Assistance. MaxA to control decent to bedside chair returning from BR. Comment: Max cues for hand placement- pt required several trials to come to standing during each transfer. FUNCTIONAL MOBILITY:  Assistive Device: Rolling Walker   Assist Level:  Minimal Assistance, Moderate Assistance, and X 2. Distance:   Completed functional mobility to/from BR at very slow pace, pt is very unsteady requiring Mod-Howard x2 for balance. Pt requires max cues for walker safety to keep proximal to JERRI- extended seated rest break after trial of mobility, mod fatigue noted. ASSESSMENT:     Activity Tolerance:  Patient tolerance of  treatment: fair. Discharge Recommendations: Subacute/skilled nursing facility, 24 hour assistance or supervision, Not safe to return home at this time, and Patient would benefit from continued OT at discharge  Equipment Recommendations:  Other: Monitor pending progress  Plan: Times Per Week: 3-5x  Current Treatment Recommendations: Balance training, Functional mobility training, Safety education & training, Endurance training, Self-Care / ADL    Patient Education  Patient Education: ADL's, Importance of Increasing Activity, Assistive Device Safety, and Safety with transfers and mobility. Goals  Short Term Goals  Time Frame for Short Term Goals: until discharge  Short Term Goal 1: Pt will complete 1 min standing with min A x 1 for increased ease of toileting routine  Short Term Goal 2: Pt will complete sit-stand t/f with CGA & min vcs for UE placement & technique  Short Term Goal 3: Pt will complete mobility to bedside chair or BSC with RW, min A, & min vcs for safety  Short Term Goal 4: Pt will sequence grooming tasks with min vcs  Long Term Goals  Time Frame for Long Term Goals : No LTG set d/t short ELOS    Following session, patient left in safe position with all fall risk precautions in place.

## 2022-11-07 NOTE — PROGRESS NOTES
[address] [facility]  [facility] ICU STEPDOWN TELEMETRY 4K  DAILY NOTE    TIME   SLP Individual Minutes  Time In: 7507  Time Out: 1406  Minutes: 19  Timed Code Treatment Minutes: 10 Minutes   Dysphagia tx: 9 minutes  Cognitive tx: 10 minutes    Date: 2022  Patient Name: Gonsalo Lombardi      CSN: [id number]   : 1955  (79 y.o.)  Gender: male   Referring Physician:  Barbi [facility] MD  Diagnosis: Altered Mental Status  Precautions:  Fall risk, seizure precautions  Current Diet: Minced and moist solids. Thin liquids. Swallowing Strategies:  Full Upright Position, Small Bite/Sip, Pulmonary Monitoring, Medications Whole with Puree, Direct 1:1 Supervision, Limit Distractions, and Monitor for Fatigue   Date of Last MBS/FEES:  MBS 22    Pain:  No pain reported. Subjective: Patient seen with [facility] approval. Patient alert and agreeable to participate in ST session. Live sitter present throughout. Short-Term Goals:  SHORT TERM GOAL #1:  Goal 1: Patient will consume a minced and moist diet with thin liquids with 1:1 assistance d/t waxing/waning mentation with no pulmonary decline and adequate endurance to assist with meeting nutrition/hydration needs. INTERVENTIONS: Patient reporting no difficulty with lunch meal this date, denying coughing and choking. RN also reporting increased alertness levels since previous ST visit. Patient agreeable to intake of thin liquids via straw throughout visit. Patient able to position self at edge of bed and self feed. Several single and consecutive swallows completed with NO overt s/s of aspiration across all trials. Encouraged continued intake to improve hydration status. Also reports taking medications whole in puree without difficulty. SHORT TERM GOAL #2:  Goal 2: Patient will complete advanced PO trials with ST ONLY  as clinically indicated to determine readiness for potential diet advancement.   INTERVENTIONS: Patient agreeable to completion of regular solid trials (melodie crackers). Patient again self feeding, taking large bite sizes throughout. Demonstrating appropriate bolus formation and manipulation, AP transit, and oral clearance post swallow. Suspected timely swallow initiation with NO overt s/s of aspiration throughout multiple trials. Consumed two whole crackers without noted difficulty. Great endurance across all trials. Patient agreeable to be advanced to soft and bite sized solids. ST recommending advancement to soft and bite sized solids. Continue thin liquids. RN Kaiden Campos notified upon ST's exit and to place order. SHORT TERM GOAL #3:  Goal 3: Patient will complete functional carryover tasks (orientation, biographics, call light) with 60% accuracy, mod cues to improve retention of functional information. INTERVENTIONS:  Orientation:  JANET - indep  Date - min cues  Month - min cues  [address] with use of watch *Patient with no clock in room, therefore ST providing watch to use    Biographics:  Address - min cues as patient originally stated \"[address]\"  POA - indep recall of name and relationship as sister    SHORT TERM GOAL #4:  Goal 4: Patient will complete delayed/immediate recall tasks and functional memory tasks with focus on memory strategies with 50% accuracy, max cues to improve retention of pertinant information. INTERVENTIONS: Did not directly address due to focus on other goals. SHORT TERM GOAL #5:  Goal 5: Patient will complete safety, problem solving, verbal/visual reasoning, and executive functioning tasks (meds, math/money/finance, time) with 60% accuracy, mod cues to improve contributions to daily ADLS/IADLS. INTERVENTIONS: Patient stating his phone was not working. Required total assist from ST to problem solve and check power source. Plug not fully in place, therefore ST fixed and patient then able to power on and off phone.     Problem solving:  Location of POA's phone number: independent    Discharge planning:  Patient with fair insight; stating he will go back to home with his brother however no actual plans in place at this time    Vickey Garcia7 #6:  Goal 6: Patient will complete thought organization(confrontational, divergent naming) and sequencing tasks with 60% accuracy, mod cues to improve mental flexibility for daily living scenarios. INTERVENTIONS: Did not directly address due to focus on other goals. Long-Term Goals:   No established LTGs due to short ELOS. EDUCATION:  Learner: Patient  Education:  Reviewed results and recommendations of this evaluation, Reviewed diet and strategies, Reviewed ST goals and Plan of Care, and Education Related to [employer] [facility]  Evaluation of Education: Demonstrates with assistance, Needs further instruction, and Family not present    ASSESSMENT/PLAN:  Activity Tolerance:  Patient tolerance of  treatment: good. Assessment/Plan: Patient progressing toward established goals. Continues to require skilled care of licensed speech pathologist to progress toward achievement of established goals and plan of care. .     Plan for Next Session: cognitive tasks, advanced texture trials  Discharge Recommendations: [facility]     Zaria Diaz M.S., [address] [place] [address]

## 2022-11-07 NOTE — CARE COORDINATION
11/7/22, 11:29 AM EST  DISCHARGE PLANNING EVALUATION    SILAS called sister Junior Morgan. She reported that [facility] is out of network and will need other ECFs. SILAS provided a list of in network ECFs. Juinor Morgan will review and let SILAS know.

## 2022-11-07 NOTE — PROGRESS NOTES
Comprehensive Nutrition Assessment    Type and Reason for Visit:  Initial, RD Nutrition Re-Screen/LOS    Nutrition Recommendations/Plan:   ONS initiated: Ensure Enlive TID  Diet as per Speech Therapy/ Doctor  Continue MVI, thiamine and folic acid as ordered      Malnutrition Assessment:  Malnutrition Status:  Insufficient data (11/07/22 1210)    Context:  Acute Illness     Findings of the 6 clinical characteristics of malnutrition:  Energy Intake:   (variable intake since admission, reports improving appetite)  Weight Loss:   (8% loss per patient report however unsure of time frame; unable to confirm with EMR)     Body Fat Loss:  Unable to assess     Muscle Mass Loss:  Unable to assess    Fluid Accumulation:  Unable to assess     Strength:  Not Performed    Nutrition Assessment:     Pt. nutritionally compromised AEB variable intake since admission. At risk for further nutrition compromise r/t altered mental status, aspiration pneumonia, HENRY and underlying medical condition (chronic alcohol abuse, diverticulitis, depression). Nutrition Related Findings:    Pt. Report/Treatments/Miscellaneous: Patient seen with sitter at bedside, reports sometimes eats less and appetite improving, agreeable to trial ONS, note report recent 2 week psychiatric stay in [place]   GI Status: BM 11/7  Pertinent Labs: (11/6) Potassium 4.4, BUN 12, Creatinine 0.9, Glucose 109  Pertinent Meds: rocephin, folic acid, thiamine, MVI, remeron      Wound Type:  (scattered abrasion)       Current Nutrition Intake & Therapies:    Average Meal Intake: 1-25%, 51-75%, %     ADULT DIET;  Dysphagia - Minced and Moist    Anthropometric Measures:  Height: 5' 11\" (180.3 cm)  Ideal Body Weight (IBW): 172 lbs (78 kg)    Admission Body Weight: 173 lb 8 oz (78.7 kg) (10/30; no edema)  Current Body Weight: 191 lb (86.6 kg) (11/4; no edema),      Current BMI (kg/m2): 26.7  Usual Body Weight:  (patient reports usual weight 208#; per EMR: 1/16/22 stated 180#)                       BMI Categories: Overweight (BMI 25.0-29. 9)    Estimated Daily Nutrient Needs:  Energy Requirements Based On: Kcal/kg  Weight Used for Energy Requirements: Current (87kgm on 11/4)  Energy (kcal/day): 2161-8442 kcals (20-25)  Weight Used for Protein Requirements: Ideal (78kgm)  Protein (g/day):  grams (1.2-2)       Nutrition Diagnosis:   Inadequate oral intake related to inadequate protein-energy intake as evidenced by intake 51-75% (or less of some meals since admission)    Nutrition Interventions:   Food and/or Nutrient Delivery: Continue Current Diet, Start Oral Nutrition Supplement  Nutrition Education/Counseling: Education initiated (encouraged intake at best effort and use of ONS)  Coordination of Nutrition Care: Continue to monitor while inpatient       Goals:     Goals: PO intake 75% or greater, by next RD assessment       Nutrition Monitoring and Evaluation:      Food/Nutrient Intake Outcomes: Food and Nutrient Intake, Supplement Intake  Physical Signs/Symptoms Outcomes: Biochemical Data, Fluid Status or Edema, Meal Time Behavior, Nutrition Focused Physical Findings, Skin, Weight, GI Status    Discharge Planning:     Too soon to determine     Jodee Pavon RD, LD  Contact: [phone]
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
## 2022-11-07 NOTE — PLAN OF CARE
positive airway pressure, respiratory therapy assess nares and determine need for appliance change or resting period. Outcome: Progressing     Problem: ABCDS Injury Assessment  Goal: Absence of physical injury  Outcome: Progressing  Flowsheets (Taken 11/7/2022 1021)  Absence of Physical Injury: Implement safety measures based on patient assessment     Problem: Safety - Adult  Goal: Free from fall injury  Outcome: Progressing  Flowsheets (Taken 11/7/2022 1021)  Free From Fall Injury: Instruct family/caregiver on patient safety     Problem: Pain  Goal: Verbalizes/displays adequate comfort level or baseline comfort level  Outcome: Completed  Flowsheets (Taken 11/7/2022 1021)  Verbalizes/displays adequate comfort level or baseline comfort level:   Encourage patient to monitor pain and request assistance   Assess pain using appropriate pain scale  Note: Pain Assessment: None - Denies Pain  Pain Level: 0   Patient's Stated Pain Goal: 0 - No pain   Is pain goal met at this time? Yes     Non-Pharmaceutical Pain Intervention(s): Rest, Repositioned    Care plan reviewed with patient. Patient verbalize understanding of the plan of care and contribute to goal setting.

## 2022-11-07 NOTE — CARE COORDINATION
11/7/22, 1:31 PM EST    DISCHARGE ON GOING EVALUATION    [address] [place] [address] day: 9  Location: [facility]27/027- Reason for admit: Fall at home, initial encounter [W19. XXXA, Y92.009]  Altered mental status [R41.82]   Procedure: 10/29 Intubated  10/30 MRI Brain: No acute findings; Areas of volume loss and abnormal signal in the inferior right frontal lobe and the right parietal lobe. Both these areas have associated prior hemorrhage. These could be posttraumatic injuries versus sites of old infarct. These do not appear acute; Mild severity chronic small vessel ischemic changes  10/30 EEG: No seizures noted; suggests moderate encelphaopathy  10/30 LP: Negative  10/30 Extubated  10/31 CXR: Interval removal of the endotracheal tube. Borderline cardiomegaly. Slight increased density at both lung bases. Atherosclerotic calcification in the aortic arch. 10/31 EEG: This EEG was abnormal due to disorganized and slow background in polymorphic delta and theta frequency. Superimposed fast beta activity was seen over the right frontocentral leads. 11/1 MBS: minced and moist with thin liquids. 11/1 EEG: This EEG was abnormal. Disorganized and slow background suggested moderate encephalopathy of non specific etiology. Breach rhythm over the right frontocentral leads suggested underlying skull defect. Barriers to Discharge: Completed regimen of IV Rocephin. Sitter remains at bedside. Working on discharge disposition yet. PCP: Fantasma Corona  Readmission Risk Score: 17.4%  Patient Goals/Plan/Treatment Preferences: SW following to assist with discharge planning as patient unable to return. Family declining [facility].

## 2022-11-08 PROCEDURE — 97116 GAIT TRAINING THERAPY: CPT

## 2022-11-08 PROCEDURE — 97530 THERAPEUTIC ACTIVITIES: CPT

## 2022-11-08 PROCEDURE — 1200000000 HC SEMI PRIVATE

## 2022-11-08 PROCEDURE — 97110 THERAPEUTIC EXERCISES: CPT

## 2022-11-08 PROCEDURE — 6370000000 HC RX 637 (ALT 250 FOR IP): Performed by: FAMILY MEDICINE

## 2022-11-08 PROCEDURE — 6360000002 HC RX W HCPCS: Performed by: NURSE PRACTITIONER

## 2022-11-08 PROCEDURE — 6370000000 HC RX 637 (ALT 250 FOR IP): Performed by: STUDENT IN AN ORGANIZED HEALTH CARE EDUCATION/TRAINING PROGRAM

## 2022-11-08 PROCEDURE — 6370000000 HC RX 637 (ALT 250 FOR IP): Performed by: PSYCHIATRY & NEUROLOGY

## 2022-11-08 PROCEDURE — 6370000000 HC RX 637 (ALT 250 FOR IP): Performed by: INTERNAL MEDICINE

## 2022-11-08 RX ADMIN — Medication 3 MG: at 20:33

## 2022-11-08 RX ADMIN — RISPERIDONE 0.5 MG: 1 TABLET ORAL at 20:34

## 2022-11-08 RX ADMIN — Medication 1 TABLET: at 09:58

## 2022-11-08 RX ADMIN — RISPERIDONE 0.5 MG: 1 TABLET ORAL at 08:46

## 2022-11-08 RX ADMIN — AMOXICILLIN AND CLAVULANATE POTASSIUM 1 TABLET: 875; 125 TABLET, FILM COATED ORAL at 08:47

## 2022-11-08 RX ADMIN — RISPERIDONE 1 MG: 1 TABLET ORAL at 22:08

## 2022-11-08 RX ADMIN — FOLIC ACID 1 MG: 1 TABLET ORAL at 08:47

## 2022-11-08 RX ADMIN — PANTOPRAZOLE SODIUM 40 MG: 40 TABLET, DELAYED RELEASE ORAL at 20:34

## 2022-11-08 RX ADMIN — MIRTAZAPINE 15 MG: 15 TABLET, FILM COATED ORAL at 20:34

## 2022-11-08 RX ADMIN — POTASSIUM CHLORIDE 20 MEQ: 1500 TABLET, EXTENDED RELEASE ORAL at 08:46

## 2022-11-08 RX ADMIN — CARVEDILOL 6.25 MG: 6.25 TABLET, FILM COATED ORAL at 08:47

## 2022-11-08 RX ADMIN — PANTOPRAZOLE SODIUM 40 MG: 40 TABLET, DELAYED RELEASE ORAL at 08:46

## 2022-11-08 RX ADMIN — ENOXAPARIN SODIUM 40 MG: 100 INJECTION SUBCUTANEOUS at 08:47

## 2022-11-08 RX ADMIN — POTASSIUM CHLORIDE 20 MEQ: 1500 TABLET, EXTENDED RELEASE ORAL at 15:38

## 2022-11-08 RX ADMIN — TRAZODONE HYDROCHLORIDE 50 MG: 50 TABLET ORAL at 20:34

## 2022-11-08 RX ADMIN — AMOXICILLIN AND CLAVULANATE POTASSIUM 1 TABLET: 875; 125 TABLET, FILM COATED ORAL at 20:34

## 2022-11-08 RX ADMIN — POTASSIUM CHLORIDE 20 MEQ: 1500 TABLET, EXTENDED RELEASE ORAL at 11:16

## 2022-11-08 RX ADMIN — Medication 100 MG: at 08:46

## 2022-11-08 RX ADMIN — AMLODIPINE BESYLATE 10 MG: 10 TABLET ORAL at 08:47

## 2022-11-08 RX ADMIN — CARVEDILOL 6.25 MG: 6.25 TABLET, FILM COATED ORAL at 20:34
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
## 2022-11-08 NOTE — PLAN OF CARE
Problem: Discharge Planning  Goal: Discharge to home or other facility with appropriate resources  11/8/2022 1016 by Jeremiah Ceballos RN  Outcome: Progressing  Flowsheets (Taken 11/8/2022 0909)  Discharge to home or other facility with appropriate resources:   Arrange for needed discharge resources and transportation as appropriate   Identify barriers to discharge with patient and caregiver   Identify discharge learning needs (meds, wound care, etc)   Refer to discharge planning if patient needs post-hospital services based on physician order or complex needs related to functional status, cognitive ability or social support system     Problem: Confusion  Goal: Confusion, delirium, dementia, or psychosis is improved or at baseline  Description: INTERVENTIONS:  1. Assess for possible contributors to thought disturbance, including medications, impaired vision or hearing, underlying metabolic abnormalities, dehydration, psychiatric diagnoses, and notify attending LIP  2. [place] high risk fall precautions, as indicated  3. Provide frequent short contacts to provide reality reorientation, refocusing and direction  4. Decrease environmental stimuli, including noise as appropriate  5. Monitor and intervene to maintain adequate nutrition, hydration, elimination, sleep and activity  6. If unable to ensure safety without constant attention obtain sitter and review sitter guidelines with assigned personnel  7.  Initiate Psychosocial CNS and Spiritual Care consult, as indicated  11/8/2022 1016 by Jeremiah Ceballos RN  Outcome: Progressing  Flowsheets (Taken 11/8/2022 0909)  Effect of thought disturbance (confusion, delirium, dementia, or psychosis) are managed with adequate functional status: Assess for contributors to thought disturbance, including medications, impaired vision or hearing, underlying metabolic abnormalities, dehydration, psychiatric diagnoses, notify LIP     Problem: Skin/Tissue Integrity  Goal: Absence of new skin breakdown  Description: 1. Monitor for areas of redness and/or skin breakdown  2. Assess vascular access sites hourly  3. Every 4-6 hours minimum:  Change oxygen saturation probe site  4. Every 4-6 hours:  If on nasal continuous positive airway pressure, respiratory therapy assess nares and determine need for appliance change or resting period. 11/8/2022 1016 by Paris Squires RN  Outcome: Progressing  Note: Pt shows no signs of new skin breakdown. Patient turns self and Q2 turns in place as patient allows. Education provided on the importance of repositioning. Problem: ABCDS Injury Assessment  Goal: Absence of physical injury  11/8/2022 1016 by Paris Squires RN  Outcome: Progressing  Flowsheets (Taken 11/8/2022 1016)  Absence of Physical Injury: Implement safety measures based on patient assessment     Problem: Safety - Adult  Goal: Free from fall injury  11/8/2022 1016 by Paris Squires RN  Outcome: Progressing  Flowsheets (Taken 11/8/2022 1016)  Free From Fall Injury:   Instruct family/caregiver on patient safety   Based on caregiver fall risk screen, instruct family/caregiver to ask for assistance with transferring infant if caregiver noted to have fall risk factors  Note: Pt remains free from falls. Bed alarm on, call light and personal items within reach. Problem: Nutrition Deficit:  Goal: Optimize nutritional status  11/8/2022 1016 by Paris Squires RN  Outcome: Progressing  Flowsheets (Taken 11/8/2022 1016)  Nutrient intake appropriate for improving, restoring, or maintaining nutritional needs:   Assess nutritional status and recommend course of action   Monitor oral intake, labs, and treatment plans   Recommend appropriate diets, oral nutritional supplements, and vitamin/mineral supplements   Care plan reviewed with patient. Patient verbalize understanding of the plan of care and contribute to goal setting.

## 2022-11-08 NOTE — PLAN OF CARE
Problem: Discharge Planning  Goal: Discharge to home or other facility with appropriate resources  11/7/2022 2207 by Yousuf Padilla RN  Outcome: Progressing  Flowsheets (Taken 11/7/2022 2207)  Discharge to home or other facility with appropriate resources:   Identify barriers to discharge with patient and caregiver   Arrange for needed discharge resources and transportation as appropriate   Identify discharge learning needs (meds, wound care, etc)  11/7/2022 1021 by Millie Holley RN  Outcome: Progressing  Flowsheets (Taken 11/7/2022 1021)  Discharge to home or other facility with appropriate resources:   Identify barriers to discharge with patient and caregiver   Arrange for needed discharge resources and transportation as appropriate  Note: Waiting for precert. Problem: Confusion  Goal: Confusion, delirium, dementia, or psychosis is improved or at baseline  Description: INTERVENTIONS:  1. Assess for possible contributors to thought disturbance, including medications, impaired vision or hearing, underlying metabolic abnormalities, dehydration, psychiatric diagnoses, and notify attending LIP  2. [place] high risk fall precautions, as indicated  3. Provide frequent short contacts to provide reality reorientation, refocusing and direction  4. Decrease environmental stimuli, including noise as appropriate  5. Monitor and intervene to maintain adequate nutrition, hydration, elimination, sleep and activity  6. If unable to ensure safety without constant attention obtain sitter and review sitter guidelines with assigned personnel  7.  Initiate Psychosocial CNS and Spiritual Care consult, as indicated  11/7/2022 2207 by Yousuf Padilla RN  Outcome: Progressing  Flowsheets (Taken 11/7/2022 1021 by Millie Holley RN)  Effect of thought disturbance (confusion, delirium, dementia, or psychosis) are managed with adequate functional status:   Assess for contributors to thought disturbance, including medications, impaired vision or hearing, underlying metabolic abnormalities, dehydration, psychiatric diagnoses, notify [place] high risk fall precautions, as indicated  11/7/2022 1021 by Georgie Mathias RN  Outcome: Progressing  Flowsheets (Taken 11/7/2022 1021)  Effect of thought disturbance (confusion, delirium, dementia, or psychosis) are managed with adequate functional status:   Assess for contributors to thought disturbance, including medications, impaired vision or hearing, underlying metabolic abnormalities, dehydration, psychiatric diagnoses, notify [place] high risk fall precautions, as indicated     Problem: Skin/Tissue Integrity  Goal: Absence of new skin breakdown  Description: 1. Monitor for areas of redness and/or skin breakdown  2. Assess vascular access sites hourly  3. Every 4-6 hours minimum:  Change oxygen saturation probe site  4. Every 4-6 hours:  If on nasal continuous positive airway pressure, respiratory therapy assess nares and determine need for appliance change or resting period.   11/7/2022 2207 by Jose Tian RN  Outcome: Progressing  11/7/2022 1021 by Georgie Mathias RN  Outcome: Progressing     Problem: ABCDS Injury Assessment  Goal: Absence of physical injury  11/7/2022 2207 by Joes Tian RN  Outcome: Progressing  Flowsheets (Taken 11/7/2022 1021 by Georgie Mathias RN)  Absence of Physical Injury: Implement safety measures based on patient assessment  11/7/2022 1021 by Georgie Mathias RN  Outcome: Progressing  Flowsheets (Taken 11/7/2022 1021)  Absence of Physical Injury: Implement safety measures based on patient assessment     Problem: Safety - Adult  Goal: Free from fall injury  11/7/2022 2207 by Jose Tian RN  Outcome: Progressing  Flowsheets (Taken 11/7/2022 2207)  Free From Fall Injury:   Instruct family/caregiver on patient safety   Based on caregiver fall risk screen, instruct family/caregiver to ask for assistance with transferring infant if caregiver noted to have fall risk factors  11/7/2022 1021 by Luisa Terry RN  Outcome: Progressing  Flowsheets (Taken 11/7/2022 1021)  Free From Fall Injury: Instruct family/caregiver on patient safety     Problem: Nutrition Deficit:  Goal: Optimize nutritional status  Outcome: Progressing  Flowsheets (Taken 11/7/2022 2207)  Nutrient intake appropriate for improving, restoring, or maintaining nutritional needs: Assess nutritional status and recommend course of action   Care plan reviewed with patient. Patient verbalize understanding of the plan of care and contribute to goal setting.

## 2022-11-08 NOTE — PROGRESS NOTES
[facility]  INPATIENT PHYSICAL THERAPY  DAILY NOTE  STRZ ICU STEPDOWN TELEMETRY 4K - [id number]      Time In: 1406  Time Out: 8160  Timed Code Treatment Minutes: 45 Minutes  Minutes: 38          Date: 2022  Patient Name: Raúl Damon,  Gender:  male        MRN: [id number]  : 1955  (79 y.o.)     Referring Practitioner: Jodee Varner MD  Diagnosis: Fall at home, initial encounter  Additional Pertinent Hx: Beth Perez is a 69-year-old  male with past medical history significant for alcoholism, fatty liver disease,, HTN and TBI with subdural hematoma s/p craniotomy and evacuation in 2021 who presented to [facility] ED for altered mental status from nursing home. Pt was found crawling on the floor, he is altered and combative. Not following commands or answering questions. He has been refusing his meds. Pt did have a recent two week stay in psychiatric facility in [place] though no records available at this time. Pt unable to contribute to his history. MRI, EEG and LP negative. Prior Level of Function:  Type of Home: Facility ([facility]  Home Layout: One level        ADL Assistance: Needs assistance  Ambulation Assistance: Needs assistance  Transfer Assistance: Needs assistance  Additional Comments: Pt from [facility] prior to this admission. Per chart Pt was at a [place] [facility] prior to admission to [facility]. Pt unable to give PLOF info d/t significant confusion.     Restrictions/Precautions:  Restrictions/Precautions: Fall Risk  Position Activity Restriction  Other position/activity restrictions: confusion, high fall risk       SUBJECTIVE: nursing ok'd therapy, pt cont be a little confused at time but easily redirected - pt cont to be impulsive and demonstrated decreased safety awareness     PAIN: 0/10:       Vitals:   Oxygen: on room air 97% HR 98    OBJECTIVE:  Bed Mobility:  Rolling to Right: Contact Guard Assistance   Supine to Sit: [employer], with head of bed flat, with rail, with verbal cues   Sit to Supine: Independent, Contact Guard Assistance   Scooting: Contact Guard Assistance  ** pt required cues for safety and to wait to sit till therapist ready, pt also required cues to remove all his covers   Transfers:  Sit to Stand: Minimal Assistance  Stand to Sit:Minimal Assistance, to mod assist with cues for hand placement and to back upto surface- pt required cues 100% of time for safety     Ambulation:  Minimal Assistance  Distance: 8 feet x 2, 20x1  Surface: Level Tile  Device:Rolling Walker  Gait Deviations:  pt unsteady and demonstrated post lean, noted decreased step length and lacking heel strike and toe off, noted decreased TKE and assist to guide the walker- also followed with w/c on longer walk   Wheelchair Mobility:  Minimal Assistance  Extremities Used: Bilateral lower Extremities  Type of Chair:Manual  Surface: Level Tile  Distance: 70x2  Quality: Slow Velocity    Balance:  Sitting edge of bed with SBA to CGA pt reaching to try to shlomo his socks and he did require physical assist to complete   Standing balance w/o UE at support worked on wt shifting and midline awareness pt needed min assist due to post lean, then progressed to dynamic balance w/o UE at support with min assist he also widened his base of support   Exercise:  Patient was guided in 1 set(s) 10 reps of exercise to both lower extremities. Seated marches, Seated hamstring curls, Seated heel/toe raises, Long arc quads, and standing ex w/o UE at support upper trunk rotations, shoulder horz abd/add, marches w/ UE at support pt needed min to CGA for standing ex he demonstrated post lean  . Exercises were completed for increased independence with functional mobility.     Functional Outcome Measures: Completed  AM-PAC Inpatient Mobility without Stair Climbing Raw Score : 15  AM-PAC Inpatient without Stair Climbing T-Scale Score : 43.03    ASSESSMENT:  Assessment: Patient progressing toward established goals. Pt demonstrated generalized weakness in [place] LEs along with decreased balance, endurance and safety awareness. Pt would greatly benefit from cont skilled therapy prior to discharge home to improve functional independence and safety with functional mobility. Activity Tolerance:  Patient tolerance of  treatment: fair. Equipment Recommendations:Equipment Needed: No  Discharge Recommendations: Subacte/Skilled Nursing Facility  Plan: Current Treatment Recommendations: Strengthening, Balance training, Functional mobility training, Transfer training, Neuromuscular re-education, Safety education & training, Therapeutic activities, Patient/Caregiver education & training, Gait training  General Plan:  (3-5x GM)    Patient Education  Patient Education: Plan of Care    Goals:  Patient Goals : does not state  Short Term Goals  Time Frame for Short Term Goals: by discharge  Short Term Goal 1: Pt to transfer supine <--> sit SBA to enable pt to get in/out of bed. Short Term Goal 2: Pt to transfer sit <--> stand CGA for increased functional mobility. Short Term Goal 3: amb >10'x1 with RW and CGA to walk safely to bathroom  Long Term Goals  Time Frame for Long Term Goals : NA due to short length of stay. Following session, patient left in safe position with all fall risk precautions in place.

## 2022-11-08 NOTE — CARE COORDINATION
11/8/22, 10:15 AM EST  DISCHARGE PLANNING EVALUATION    SW called sister Lc Farrell and she reported that her brother was over this morning and they have been reviewing the list. They would like referrals sent to [facility], [address] [place] [address], [address] and [facility] [address] . SILAS explained that patient will be a difficult placement due to his history of behaviors. SW explained that if SW exhausts ECF options she will need to consider other options. Lc Farrell voiced understanding. SW spoke with patient about ECF. Patient is agreeable to go for rehab. He reported that it is not something that he would like to do but understands he needs to go to get his strength back. Patient reported that he has been through a lot the last month and knows he is not back to being 100% yet. SW explained that Lc Farrell provided ECFs for SW to call and make referrals to. Patient reported that he trusts his sister and knows she wants what is best for him. SW called and made referral to [facility]. SW called and left voicemail for Richard at [address] [place] [address]. 11/8/22, 1:50 PM EST  DISCHARGE PLANNING EVALUATION    SW received a call from Richard at [address] [place] [address] and he reported that he is willing to review information and do on-site visit with patient. SW received a call from [facility] and they denied patient.

## 2022-11-09 PROBLEM — D61.818 PANCYTOPENIA (HCC): Status: ACTIVE | Noted: 2022-11-09

## 2022-11-09 PROBLEM — J69.0 ASPIRATION PNEUMONIA OF BOTH LUNGS (HCC): Status: ACTIVE | Noted: 2022-11-09

## 2022-11-09 PROBLEM — E87.20 METABOLIC ACIDOSIS: Status: ACTIVE | Noted: 2022-11-09

## 2022-11-09 PROBLEM — Z72.0 TOBACCO ABUSE: Status: ACTIVE | Noted: 2022-11-09

## 2022-11-09 PROBLEM — E87.6 HYPOKALEMIA: Status: ACTIVE | Noted: 2022-11-09

## 2022-11-09 PROBLEM — G92.9 TOXIC ENCEPHALOPATHY: Status: ACTIVE | Noted: 2022-11-09

## 2022-11-09 PROBLEM — F19.11 HISTORY OF DRUG ABUSE (HCC): Status: ACTIVE | Noted: 2022-11-09

## 2022-11-09 PROBLEM — N18.2 CKD (CHRONIC KIDNEY DISEASE), STAGE II: Status: ACTIVE | Noted: 2022-11-09

## 2022-11-09 PROBLEM — R13.10 DYSPHAGIA: Status: ACTIVE | Noted: 2022-11-09

## 2022-11-09 PROBLEM — F10.10 ALCOHOL ABUSE: Status: ACTIVE | Noted: 2022-11-09

## 2022-11-09 PROBLEM — Z86.73 HISTORY OF CVA (CEREBROVASCULAR ACCIDENT): Status: ACTIVE | Noted: 2022-11-09

## 2022-11-09 PROBLEM — Z87.820 HISTORY OF TRAUMATIC BRAIN INJURY: Status: ACTIVE | Noted: 2022-11-09

## 2022-11-09 LAB
ANION GAP SERPL CALCULATED.3IONS-SCNC: 10 MEQ/L (ref 8–16)
BASOPHILS # BLD: 0.2 %
BASOPHILS ABSOLUTE: 0 THOU/MM3 (ref 0–0.1)
BUN BLDV-MCNC: 9 MG/DL (ref 7–22)
CALCIUM SERPL-MCNC: 8.4 MG/DL (ref 8.5–10.5)
CHLORIDE BLD-SCNC: 106 MEQ/L (ref 98–111)
CO2: 20 MEQ/L (ref 23–33)
CREAT SERPL-MCNC: 1 MG/DL (ref 0.4–1.2)
EOSINOPHIL # BLD: 2 %
EOSINOPHILS ABSOLUTE: 0.1 THOU/MM3 (ref 0–0.4)
ERYTHROCYTE [DISTWIDTH] IN BLOOD BY AUTOMATED COUNT: 17 % (ref 11.5–14.5)
ERYTHROCYTE [DISTWIDTH] IN BLOOD BY AUTOMATED COUNT: 53.1 FL (ref 35–45)
GFR SERPL CREATININE-BSD FRML MDRD: > 60 ML/MIN/1.73M2
GLUCOSE BLD-MCNC: 124 MG/DL (ref 70–108)
HCT VFR BLD CALC: 24.3 % (ref 42–52)
HEMOGLOBIN: 7.5 GM/DL (ref 14–18)
IMMATURE GRANS (ABS): 0.01 THOU/MM3 (ref 0–0.07)
IMMATURE GRANULOCYTES: 0.2 %
LYMPHOCYTES # BLD: 17.3 %
LYMPHOCYTES ABSOLUTE: 0.7 THOU/MM3 (ref 1–4.8)
MAGNESIUM: 1.6 MG/DL (ref 1.6–2.4)
MCH RBC QN AUTO: 26.7 PG (ref 26–33)
MCHC RBC AUTO-ENTMCNC: 30.9 GM/DL (ref 32.2–35.5)
MCV RBC AUTO: 86.5 FL (ref 80–94)
MONOCYTES # BLD: 11.4 %
MONOCYTES ABSOLUTE: 0.5 THOU/MM3 (ref 0.4–1.3)
NUCLEATED RED BLOOD CELLS: 0 /100 WBC
PLATELET # BLD: 106 THOU/MM3 (ref 130–400)
PMV BLD AUTO: 11 FL (ref 9.4–12.4)
POTASSIUM SERPL-SCNC: 4.1 MEQ/L (ref 3.5–5.2)
RBC # BLD: 2.81 MILL/MM3 (ref 4.7–6.1)
SEG NEUTROPHILS: 68.9 %
SEGMENTED NEUTROPHILS ABSOLUTE COUNT: 2.8 THOU/MM3 (ref 1.8–7.7)
SODIUM BLD-SCNC: 136 MEQ/L (ref 135–145)
WBC # BLD: 4.1 THOU/MM3 (ref 4.8–10.8)

## 2022-11-09 PROCEDURE — 36415 COLL VENOUS BLD VENIPUNCTURE: CPT

## 2022-11-09 PROCEDURE — 99232 SBSQ HOSP IP/OBS MODERATE 35: CPT | Performed by: FAMILY MEDICINE

## 2022-11-09 PROCEDURE — 83735 ASSAY OF MAGNESIUM: CPT

## 2022-11-09 PROCEDURE — 97530 THERAPEUTIC ACTIVITIES: CPT

## 2022-11-09 PROCEDURE — 6370000000 HC RX 637 (ALT 250 FOR IP): Performed by: INTERNAL MEDICINE

## 2022-11-09 PROCEDURE — 85025 COMPLETE CBC W/AUTO DIFF WBC: CPT

## 2022-11-09 PROCEDURE — 6370000000 HC RX 637 (ALT 250 FOR IP): Performed by: PSYCHIATRY & NEUROLOGY

## 2022-11-09 PROCEDURE — 6370000000 HC RX 637 (ALT 250 FOR IP): Performed by: STUDENT IN AN ORGANIZED HEALTH CARE EDUCATION/TRAINING PROGRAM

## 2022-11-09 PROCEDURE — 1200000000 HC SEMI PRIVATE

## 2022-11-09 PROCEDURE — 6370000000 HC RX 637 (ALT 250 FOR IP): Performed by: FAMILY MEDICINE

## 2022-11-09 PROCEDURE — 6360000002 HC RX W HCPCS: Performed by: NURSE PRACTITIONER

## 2022-11-09 PROCEDURE — 80048 BASIC METABOLIC PNL TOTAL CA: CPT

## 2022-11-09 PROCEDURE — 97535 SELF CARE MNGMENT TRAINING: CPT

## 2022-11-09 RX ADMIN — RISPERIDONE 1 MG: 1 TABLET ORAL at 22:48

## 2022-11-09 RX ADMIN — CARVEDILOL 6.25 MG: 6.25 TABLET, FILM COATED ORAL at 22:46

## 2022-11-09 RX ADMIN — Medication 100 MG: at 09:44

## 2022-11-09 RX ADMIN — ENOXAPARIN SODIUM 40 MG: 100 INJECTION SUBCUTANEOUS at 09:45

## 2022-11-09 RX ADMIN — POTASSIUM CHLORIDE 20 MEQ: 1500 TABLET, EXTENDED RELEASE ORAL at 09:44

## 2022-11-09 RX ADMIN — Medication 3 MG: at 22:46

## 2022-11-09 RX ADMIN — LORAZEPAM 0.5 MG: 0.5 TABLET ORAL at 16:07

## 2022-11-09 RX ADMIN — AMOXICILLIN AND CLAVULANATE POTASSIUM 1 TABLET: 875; 125 TABLET, FILM COATED ORAL at 09:44

## 2022-11-09 RX ADMIN — PANTOPRAZOLE SODIUM 40 MG: 40 TABLET, DELAYED RELEASE ORAL at 09:45

## 2022-11-09 RX ADMIN — Medication 1 TABLET: at 09:44

## 2022-11-09 RX ADMIN — FOLIC ACID 1 MG: 1 TABLET ORAL at 09:45

## 2022-11-09 RX ADMIN — AMLODIPINE BESYLATE 10 MG: 10 TABLET ORAL at 09:45

## 2022-11-09 RX ADMIN — POTASSIUM CHLORIDE 20 MEQ: 1500 TABLET, EXTENDED RELEASE ORAL at 11:40

## 2022-11-09 RX ADMIN — MIRTAZAPINE 15 MG: 15 TABLET, FILM COATED ORAL at 22:48

## 2022-11-09 RX ADMIN — POTASSIUM CHLORIDE 20 MEQ: 1500 TABLET, EXTENDED RELEASE ORAL at 16:07

## 2022-11-09 RX ADMIN — RISPERIDONE 0.5 MG: 1 TABLET ORAL at 09:44

## 2022-11-09 RX ADMIN — RISPERIDONE 0.5 MG: 1 TABLET ORAL at 22:46

## 2022-11-09 RX ADMIN — CARVEDILOL 6.25 MG: 6.25 TABLET, FILM COATED ORAL at 09:45

## 2022-11-09 RX ADMIN — PANTOPRAZOLE SODIUM 40 MG: 40 TABLET, DELAYED RELEASE ORAL at 22:54

## 2022-11-09 ASSESSMENT — PAIN SCALES - GENERAL
PAINLEVEL_OUTOF10: 0
PAINLEVEL_OUTOF10: 0

## 2022-11-09 NOTE — PROGRESS NOTES
[address] [place] [address]  Occupational Therapy  Daily Note  Time:   Time In: 8  Time Out: 0900  Timed Code Treatment Minutes: 24 Minutes  Minutes: 24          Date: 2022  Patient Name: Nadia Mendoza,   Gender: male      Room: [facility]-A  MRN: [id number]  : 1955  (79 y.o.)  Referring Practitioner: Nimesh Nicholson MD  Diagnosis: fall at home  Additional Pertinent Hx: Per ER note on 10/29/2022:67 y.o. male who presents to the Emergency Department for the evaluation of altered mentation. Patient arrives with family who provides much history. He reportedly has a longstanding history of alcoholism. Was admitted at [facility] 1 month ago for GI bleed where he had upper and lower endoscopies. Family reports that when waking from anesthesia for the endoscopies, he had altered mentation and this has persisted since. They were told they believed he had a CVA during the procedure. He was discharged to an [facility] for therapy/rehab due to some residual left-sided weakness that were sequelae from skull fracture and subarachnoid hemorrhage 1 year ago. They report 5 or 6 falls since he went to the ECF, the most recent of which occurred this morning. He has been ambulating independently without use of any assistive devices but was too weak to assist himself and getting up today, prompting his ED arrival. Pt was intubated from 10/29 to 10/30. MRI of brain was negative for acute findings. Restrictions/Precautions:  Restrictions/Precautions: Fall Risk  Position Activity Restriction  Other position/activity restrictions: confusion, high fall risk     SUBJECTIVE: Pt seated upright in bed finishing breakfast upon arrival, agreeable to OT session. PAIN: Denies.     Vitals: Vitals not assessed per clinical judgement, see nursing flowsheet    COGNITION: Slow Processing, Decreased Recall, Decreased Insight, Impaired Memory, Decreased Problem Solving, Decreased Safety Awareness, Impaired Attention, and fair sequencing for familiar tasks. ADL:   Grooming: Contact Guard Assistance and Minimal Assistance. Standing sink side washing hands with 1-2 UE release from RW. SBA for oral care seated EOB d/t fatigue- fair sequencing noted. Lower Extremity Dressing: Maximum Assistance. Threading BLE into undergarment. ModA to manage over hips in standing. Toileting: Maximum Assistance. Pt reports incontinence of stool at start of session. Pt only minimally thorough for hygiene seated on STS. Therapist completed over 75% of task and provided multiple cues not to pull up undergarment prior to fully completing hygiene  Toilet Transfer: Minimal Assistance. STS . BALANCE:  Sitting Balance:  Stand By Assistance. EOB  Standing Balance: Contact Guard Assistance, Minimal Assistance. X2 minutes standing sink side washing hands. BED MOBILITY:  Supine to Sit: Stand By Assistance    Sit to Supine: Stand By Assistance    Scooting: Stand By Assistance EOB    TRANSFERS:  Sit to Stand:  Minimal Assistance. From EOB  Stand to Sit: Contact Guard Assistance. To EOB  Comment: Max cues for hand placement. FUNCTIONAL MOBILITY:  Assistive Device: Rolling Walker   Assist Level:  Minimal Assistance, X 1, and X 2. Distance:   Completed functional mobility to/from BR and within pt room at slow pace, no LOB noted min unsteadiness throughout. Pt requires max cues for walker safety- seated rest break after trial of mobility, mod fatigue noted. ASSESSMENT:     Activity Tolerance:  Patient tolerance of  treatment: fair. Discharge Recommendations: Subacute/skilled nursing facility  Equipment Recommendations:  Other: Monitor pending progress  Plan: Times Per Week: 3-5x  Current Treatment Recommendations: Balance training, Functional mobility training, Safety education & training, Endurance training, Self-Care / ADL    Patient Education  Patient Education: ADL's, Orientation, Importance of Increasing Activity, Assistive Device Safety, and Safety with transfers and mobility. Goals  Short Term Goals  Time Frame for Short Term Goals: until discharge  Short Term Goal 1: Pt will complete 1 min standing with min A x 1 for increased ease of toileting routine  Short Term Goal 2: Pt will complete sit-stand t/f with CGA & min vcs for UE placement & technique  Short Term Goal 3: Pt will complete mobility to bedside chair or BSC with RW, min A, & min vcs for safety  Short Term Goal 4: Pt will sequence grooming tasks with min vcs  Long Term Goals  Time Frame for Long Term Goals : No LTG set d/t short ELOS    Following session, patient left in safe position with all fall risk precautions in place.

## 2022-11-09 NOTE — PLAN OF CARE
Problem: Discharge Planning  Goal: Discharge to home or other facility with appropriate resources  11/9/2022 1431 by Jammie Newton RN  Outcome: Progressing  Discharge plan is in process. Plan discharge to ECF. Problem: Confusion  Goal: Confusion, delirium, dementia, or psychosis is improved or at baseline  Description: INTERVENTIONS:  1. Assess for possible contributors to thought disturbance, including medications, impaired vision or hearing, underlying metabolic abnormalities, dehydration, psychiatric diagnoses, and notify attending LIP  2. [place] high risk fall precautions, as indicated  3. Provide frequent short contacts to provide reality reorientation, refocusing and direction  4. Decrease environmental stimuli, including noise as appropriate  5. Monitor and intervene to maintain adequate nutrition, hydration, elimination, sleep and activity  6. If unable to ensure safety without constant attention obtain sitter and review sitter guidelines with assigned personnel  7. Initiate Psychosocial CNS and Spiritual Care consult, as indicated  11/9/2022 1431 by Jammie Newton RN  Outcome: Progressing  Confusion improving. Patient alert and oriented times 2-3. Problem: Skin/Tissue Integrity  Goal: Absence of new skin breakdown  Description: 1. Monitor for areas of redness and/or skin breakdown  2. Assess vascular access sites hourly  3. Every 4-6 hours minimum:  Change oxygen saturation probe site  4. Every 4-6 hours:  If on nasal continuous positive airway pressure, respiratory therapy assess nares and determine need for appliance change or resting period. 11/9/2022 1431 by Jammie Newton RN  Outcome: Progressing  No skin breakdown this shift. Patient being assisted with turning. Patients states understanding of repositioning every two hours.       Problem: ABCDS Injury Assessment  Goal: Absence of physical injury  11/9/2022 1431 by Jammie Newton RN  Outcome: Progressing  Fall assessment completed. Patient using call light appropriately to call for assistance with ambulation to bathroom. Personal items within reach. Patient is also compliant with use of non-skid slippers. Problem: Safety - Adult  Goal: Free from fall injury  11/9/2022 1431 by Hilaria Avila RN  Outcome: Progressing  No falls noted this shift. Patient ambulates with x1 staff assistance without difficulty. Family member at bedside, spent the day. Bed kept in low position. Safe environment maintained. Bedside table & call light in reach. Uses call light appropriately when needing assistance. Problem: Nutrition Deficit:  Goal: Optimize nutritional status  11/9/2022 1431 by Hilaria Avila RN  Outcome: Progressing  Patients appetite good. Continuing to encourage fluids. Care plan reviewed with patient and family. Patient and family verbalize understanding of the plan of care and contribute to goal setting.

## 2022-11-09 NOTE — PLAN OF CARE
Problem: Discharge Planning  Goal: Discharge to home or other facility with appropriate resources  Outcome: Progressing  Flowsheets (Taken 11/8/2022 0909 by Elicia Beltrán, RN)  Discharge to home or other facility with appropriate resources:   Arrange for needed discharge resources and transportation as appropriate   Identify barriers to discharge with patient and caregiver   Identify discharge learning needs (meds, wound care, etc)   Refer to discharge planning if patient needs post-hospital services based on physician order or complex needs related to functional status, cognitive ability or social support system     Problem: Confusion  Goal: Confusion, delirium, dementia, or psychosis is improved or at baseline  Description: INTERVENTIONS:  1. Assess for possible contributors to thought disturbance, including medications, impaired vision or hearing, underlying metabolic abnormalities, dehydration, psychiatric diagnoses, and notify attending LIP  2. [place] high risk fall precautions, as indicated  3. Provide frequent short contacts to provide reality reorientation, refocusing and direction  4. Decrease environmental stimuli, including noise as appropriate  5. Monitor and intervene to maintain adequate nutrition, hydration, elimination, sleep and activity  6. If unable to ensure safety without constant attention obtain sitter and review sitter guidelines with assigned personnel  7.  Initiate Psychosocial CNS and Spiritual Care consult, as indicated  Outcome: Progressing  Flowsheets (Taken 11/8/2022 0909 by Elicia Beltrán, RN)  Effect of thought disturbance (confusion, delirium, dementia, or psychosis) are managed with adequate functional status: Assess for contributors to thought disturbance, including medications, impaired vision or hearing, underlying metabolic abnormalities, dehydration, psychiatric diagnoses, notify LIP     Problem: Skin/Tissue Integrity  Goal: Absence of new skin breakdown  Description: 1. Monitor for areas of redness and/or skin breakdown  2. Assess vascular access sites hourly  3. Every 4-6 hours minimum:  Change oxygen saturation probe site  4. Every 4-6 hours:  If on nasal continuous positive airway pressure, respiratory therapy assess nares and determine need for appliance change or resting period. Outcome: Progressing     Problem: ABCDS Injury Assessment  Goal: Absence of physical injury  Outcome: Progressing  Flowsheets (Taken 11/9/2022 0020)  Absence of Physical Injury: Implement safety measures based on patient assessment     Problem: Safety - Adult  Goal: Free from fall injury  Outcome: Progressing  Flowsheets (Taken 11/9/2022 0020)  Free From Fall Injury:   Instruct family/caregiver on patient safety   Based on caregiver fall risk screen, instruct family/caregiver to ask for assistance with transferring infant if caregiver noted to have fall risk factors     Problem: Nutrition Deficit:  Goal: Optimize nutritional status  Outcome: Progressing  Flowsheets (Taken 11/8/2022 1016 by Jeremiah Ceballos RN)  Nutrient intake appropriate for improving, restoring, or maintaining nutritional needs:   Assess nutritional status and recommend course of action   Monitor oral intake, labs, and treatment plans   Recommend appropriate diets, oral nutritional supplements, and vitamin/mineral supplements   Care plan reviewed with patient. Patient verbalize understanding of the plan of care and contribute to goal setting.

## 2022-11-09 NOTE — CARE COORDINATION
11/9/22, 8:00 AM EST    DISCHARGE ON GOING EVALUATION    [address] [place] [address] day: 11  Location: 8A-06/006-A Reason for admit: Fall at home, initial encounter [W19. XXXA, Y92.009]  Altered mental status [R41.82]   Procedure: 10/29 Intubated  10/30 MRI Brain: No acute findings; Areas of volume loss and abnormal signal in the inferior right frontal lobe and the right parietal lobe. Both these areas have associated prior hemorrhage. These could be posttraumatic injuries versus sites of old infarct. These do not appear acute; Mild severity chronic small vessel ischemic changes  10/30 EEG: No seizures noted; suggests moderate encephalopathy  10/30 LP: Negative  10/30 Extubated  10/31 CXR: Interval removal of the endotracheal tube. Borderline cardiomegaly. Slight increased density at both lung bases. Atherosclerotic calcification in the aortic arch. 10/31 EEG: This EEG was abnormal due to disorganized and slow background in polymorphic delta and theta frequency. Superimposed fast beta activity was seen over the right frontocentral leads. 11/1 MBS: minced and moist with thin liquids. 11/1 EEG: This EEG was abnormal. Disorganized and slow background suggested moderate encephalopathy of non specific etiology. Breach rhythm over the right frontocentral leads suggested underlying skull defect. Barriers to Discharge: Hospitalist, Nephrology, Psychiatry, and therapy following. Transition IV Ceftriaxone to PO Augmentin. Sitter. Continue Trazodone, Risperdal, Remeron  PCP: Kp Bass  Readmission Risk Score: 17.4%  Patient Goals/Plan/Treatment Preferences: SW following to assist with discharge planning as patient unable to return. Family declining [facility] facility. Per SW not from 11/8, Sal Philip planning on-site visit.

## 2022-11-09 NOTE — PLAN OF CARE
Problem: Discharge Planning  Goal: Discharge to home or other facility with appropriate resources  11/9/2022 0219 by Yelena Berg Daily  Outcome: Progressing  Flowsheets (Taken 11/9/2022 0219)  Discharge to home or other facility with appropriate resources:   Identify barriers to discharge with patient and caregiver   Identify discharge learning needs (meds, wound care, etc)   Refer to discharge planning if patient needs post-hospital services based on physician order or complex needs related to functional status, cognitive ability or social support system   Arrange for needed discharge resources and transportation as appropriate  11/9/2022 0020 by Chanell Garcia RN  Outcome: Progressing  [address] (Taken 11/8/2022 0909 by Robin Reynoso RN)  Discharge to home or other facility with appropriate resources:   Arrange for needed discharge resources and transportation as appropriate   Identify barriers to discharge with patient and caregiver   Identify discharge learning needs (meds, wound care, etc)   Refer to discharge planning if patient needs post-hospital services based on physician order or complex needs related to functional status, cognitive ability or social support system     Problem: Confusion  Goal: Confusion, delirium, dementia, or psychosis is improved or at baseline  Description: INTERVENTIONS:  1. Assess for possible contributors to thought disturbance, including medications, impaired vision or hearing, underlying metabolic abnormalities, dehydration, psychiatric diagnoses, and notify attending LIP  2. [place] high risk fall precautions, as indicated  3. Provide frequent short contacts to provide reality reorientation, refocusing and direction  4. Decrease environmental stimuli, including noise as appropriate  5. Monitor and intervene to maintain adequate nutrition, hydration, elimination, sleep and activity  6.  If unable to ensure safety without constant attention obtain sitter and review sitter guidelines with assigned personnel  7. Initiate Psychosocial CNS and Spiritual Care consult, as indicated  11/9/2022 0219 by Michele Rai Daily  Outcome: Progressing  Flowsheets (Taken 11/9/2022 0219)  Effect of thought disturbance (confusion, delirium, dementia, or psychosis) are managed with adequate functional status:   Assess for contributors to thought disturbance, including medications, impaired vision or hearing, underlying metabolic abnormalities, dehydration, psychiatric diagnoses, notify [facility] high risk fall precautions, as indicated   Decrease environmental stimuli, including noise as appropriate   Monitor and intervene to maintain adequate nutrition, hydration, elimination, sleep and activity   If unable to ensure safety without constant attention obtain sitter and review sitter guidelines with assigned personnel  11/9/2022 0020 by Jovan Wolf RN  Outcome: Progressing  [address] (Taken 11/8/2022 0909 by Alden Lyons RN)  Effect of thought disturbance (confusion, delirium, dementia, or psychosis) are managed with adequate functional status: Assess for contributors to thought disturbance, including medications, impaired vision or hearing, underlying metabolic abnormalities, dehydration, psychiatric diagnoses, notify LIP     Problem: Skin/Tissue Integrity  Goal: Absence of new skin breakdown  Description: 1. Monitor for areas of redness and/or skin breakdown  2. Assess vascular access sites hourly  3. Every 4-6 hours minimum:  Change oxygen saturation probe site  4. Every 4-6 hours:  If on nasal continuous positive airway pressure, respiratory therapy assess nares and determine need for appliance change or resting period.   11/9/2022 0219 by Michele Rai Daily  Outcome: Progressing  11/9/2022 0020 by Jovan Wolf RN  Outcome: Progressing     Problem: ABCDS Injury Assessment  Goal: Absence of physical injury  11/9/2022 0219 by Michele Rai Daily  Outcome: Progressing  Flowsheets (Taken 11/9/2022 8406)  Absence of Physical Injury: Implement safety measures based on patient assessment  11/9/2022 0020 by Carmen Christine RN  Outcome: Progressing  Flowsheets (Taken 11/9/2022 0020)  Absence of Physical Injury: Implement safety measures based on patient assessment     Problem: Safety - Adult  Goal: Free from fall injury  11/9/2022 0219 by Jaleel Baker Daily  Outcome: Progressing  Flowsheets (Taken 11/9/2022 0219)  Free From Fall Injury: Instruct family/caregiver on patient safety  11/9/2022 0020 by Carmen Christine RN  Outcome: Progressing  Flowsheets (Taken 11/9/2022 0020)  Free From Fall Injury:   Instruct family/caregiver on patient safety   Based on caregiver fall risk screen, instruct family/caregiver to ask for assistance with transferring infant if caregiver noted to have fall risk factors     Problem: Nutrition Deficit:  Goal: Optimize nutritional status  11/9/2022 0219 by Jaleel Baker Daily  Outcome: Progressing  Flowsheets (Taken 11/9/2022 0219)  Nutrient intake appropriate for improving, restoring, or maintaining nutritional needs:   Assess nutritional status and recommend course of action   Monitor oral intake, labs, and treatment plans   Recommend appropriate diets, oral nutritional supplements, and vitamin/mineral supplements   Order, calculate, and assess calorie counts as needed  11/9/2022 0020 by Carmen Christine RN  Outcome: Progressing  Flowsheets (Taken 11/8/2022 1016 by Fuentes Luong RN)  Nutrient intake appropriate for improving, restoring, or maintaining nutritional needs:   Assess nutritional status and recommend course of action   Monitor oral intake, labs, and treatment plans   Recommend appropriate diets, oral nutritional supplements, and vitamin/mineral supplements

## 2022-11-09 NOTE — PROGRESS NOTES
Daily Progress Note  Bettie Raines MD  11/9/2022    Reviewed patient's current plan of care and vital signs with nursing staff. Patient is seen with his sister. He is less confused and is able to communicate much better. His sister feels that he has improved a lot. SUBJECTIVE:    Patient is feeling better. No suicidal ideation. ROS: Patient has new complaints:  No  Sleeping adequately:  Yes      Mental Status Examination:  Patient is cooperative. Speech: Normal rate and tone. No abnormal movements, tics or mannerisms. Mood euthymic; affect restricted. Suicidal ideation Absent. Homicidal ideations Absent. Hallucinations Absent. Delusions Absent. Thought Content: normal. Thought Processes: More appropriate. Alert and oriented X 2, partially to time. Attention and concentration impaired. MEMORY not tested. Insight and Judgement limited.       Medications  Current Facility-Administered Medications: folic acid (FOLVITE) tablet 1 mg, 1 mg, Oral, Daily  traZODone (DESYREL) tablet 50 mg, 50 mg, Oral, Nightly PRN  risperiDONE (RISPERDAL) tablet 1 mg, 1 mg, Oral, Nightly  potassium chloride (KLOR-CON M) extended release tablet 20 mEq, 20 mEq, Oral, TID WC  haloperidol lactate (HALDOL) injection 5 mg, 5 mg, IntraMUSCular, Q6H PRN  mirtazapine (REMERON) tablet 15 mg, 15 mg, Oral, Nightly  risperiDONE (RISPERDAL) tablet 0.5 mg, 0.5 mg, Oral, BID  multivitamin 1 tablet, 1 tablet, Oral, Daily  nicotine (NICODERM CQ) 21 MG/24HR 1 patch, 1 patch, TransDERmal, Q24H  pantoprazole (PROTONIX) tablet 40 mg, 40 mg, Oral, BID  LORazepam (ATIVAN) tablet 0.5 mg, 0.5 mg, Oral, TID PRN  amLODIPine (NORVASC) tablet 10 mg, 10 mg, Oral, Daily  carvedilol (COREG) tablet 6.25 mg, 6.25 mg, Oral, BID  [Held by provider] hydrALAZINE (APRESOLINE) tablet 50 mg, 50 mg, Oral, Q8H  melatonin tablet 3 mg, 3 mg, Oral, Nightly  thiamine tablet 100 mg, 100 mg, Oral, Daily  potassium chloride (KLOR-CON M) extended release tablet 40 mEq, 40 mEq, Oral, PRN **OR** potassium bicarb-citric acid (EFFER-K) effervescent tablet 40 mEq, 40 mEq, Oral, PRN **OR** potassium chloride 10 mEq/100 mL IVPB (Peripheral Line), 10 mEq, IntraVENous, PRN  [Held by provider] hydrALAZINE (APRESOLINE) injection 5 mg, 5 mg, IntraVENous, Q6H PRN  [Held by provider] metoprolol (LOPRESSOR) injection 5 mg, 5 mg, IntraVENous, Q6H PRN  sodium chloride flush 0.9 % injection 5-40 mL, 5-40 mL, IntraVENous, 2 times per day  sodium chloride flush 0.9 % injection 5-40 mL, 5-40 mL, IntraVENous, PRN  0.9 % sodium chloride infusion, , IntraVENous, PRN  enoxaparin (LOVENOX) injection 40 mg, 40 mg, SubCUTAneous, Daily  ondansetron (ZOFRAN-ODT) disintegrating tablet 4 mg, 4 mg, Oral, Q8H PRN **OR** ondansetron (ZOFRAN) injection 4 mg, 4 mg, IntraVENous, Q6H PRN  polyethylene glycol (GLYCOLAX) packet 17 g, 17 g, Oral, Daily PRN  acetaminophen (TYLENOL) tablet 650 mg, 650 mg, Oral, Q6H PRN **OR** acetaminophen (TYLENOL) suppository 650 mg, 650 mg, Rectal, Q6H PRN    ASSESSMENT AND PLAN  Patient's symptoms are improving   Continue with current psychotropics  Attempt to develop insight  Supportive Therapy conducted.
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
## 2022-11-09 NOTE — PROGRESS NOTES
Patient's sister Ana Khoury notified of patient transfer to [facility]. Update also provided at this time.

## 2022-11-10 LAB — GLUCOSE BLD-MCNC: 110 MG/DL (ref 70–108)

## 2022-11-10 PROCEDURE — 99232 SBSQ HOSP IP/OBS MODERATE 35: CPT | Performed by: FAMILY MEDICINE

## 2022-11-10 PROCEDURE — 6370000000 HC RX 637 (ALT 250 FOR IP): Performed by: PSYCHIATRY & NEUROLOGY

## 2022-11-10 PROCEDURE — 82948 REAGENT STRIP/BLOOD GLUCOSE: CPT

## 2022-11-10 PROCEDURE — 6370000000 HC RX 637 (ALT 250 FOR IP): Performed by: INTERNAL MEDICINE

## 2022-11-10 PROCEDURE — 97116 GAIT TRAINING THERAPY: CPT

## 2022-11-10 PROCEDURE — 2580000003 HC RX 258: Performed by: NURSE PRACTITIONER

## 2022-11-10 PROCEDURE — 6370000000 HC RX 637 (ALT 250 FOR IP): Performed by: STUDENT IN AN ORGANIZED HEALTH CARE EDUCATION/TRAINING PROGRAM

## 2022-11-10 PROCEDURE — 6360000002 HC RX W HCPCS: Performed by: NURSE PRACTITIONER

## 2022-11-10 PROCEDURE — 1200000000 HC SEMI PRIVATE

## 2022-11-10 PROCEDURE — 6370000000 HC RX 637 (ALT 250 FOR IP): Performed by: FAMILY MEDICINE

## 2022-11-10 PROCEDURE — 97110 THERAPEUTIC EXERCISES: CPT

## 2022-11-10 PROCEDURE — 97530 THERAPEUTIC ACTIVITIES: CPT

## 2022-11-10 RX ADMIN — PANTOPRAZOLE SODIUM 40 MG: 40 TABLET, DELAYED RELEASE ORAL at 20:05

## 2022-11-10 RX ADMIN — POTASSIUM CHLORIDE 20 MEQ: 1500 TABLET, EXTENDED RELEASE ORAL at 17:23

## 2022-11-10 RX ADMIN — CARVEDILOL 6.25 MG: 6.25 TABLET, FILM COATED ORAL at 09:52

## 2022-11-10 RX ADMIN — SODIUM CHLORIDE, PRESERVATIVE FREE 10 ML: 5 INJECTION INTRAVENOUS at 20:07

## 2022-11-10 RX ADMIN — PANTOPRAZOLE SODIUM 40 MG: 40 TABLET, DELAYED RELEASE ORAL at 09:52

## 2022-11-10 RX ADMIN — MIRTAZAPINE 15 MG: 15 TABLET, FILM COATED ORAL at 20:05

## 2022-11-10 RX ADMIN — POTASSIUM CHLORIDE 20 MEQ: 1500 TABLET, EXTENDED RELEASE ORAL at 12:41

## 2022-11-10 RX ADMIN — FOLIC ACID 1 MG: 1 TABLET ORAL at 09:52

## 2022-11-10 RX ADMIN — Medication 3 MG: at 20:05

## 2022-11-10 RX ADMIN — Medication 1 TABLET: at 09:52

## 2022-11-10 RX ADMIN — RISPERIDONE 0.5 MG: 1 TABLET ORAL at 20:05

## 2022-11-10 RX ADMIN — RISPERIDONE 1 MG: 1 TABLET ORAL at 20:06

## 2022-11-10 RX ADMIN — AMLODIPINE BESYLATE 10 MG: 10 TABLET ORAL at 09:52

## 2022-11-10 RX ADMIN — Medication 100 MG: at 09:52

## 2022-11-10 RX ADMIN — RISPERIDONE 0.5 MG: 1 TABLET ORAL at 09:52

## 2022-11-10 RX ADMIN — POTASSIUM CHLORIDE 20 MEQ: 1500 TABLET, EXTENDED RELEASE ORAL at 09:52

## 2022-11-10 RX ADMIN — ENOXAPARIN SODIUM 40 MG: 100 INJECTION SUBCUTANEOUS at 09:55

## 2022-11-10 RX ADMIN — CARVEDILOL 6.25 MG: 6.25 TABLET, FILM COATED ORAL at 20:05

## 2022-11-10 NOTE — PROGRESS NOTES
[address] [address]  INPATIENT PHYSICAL THERAPY  DAILY NOTE  [facility] MED SURG 8A - 8A-06/006-A    Time In: 8254  Time Out: 7359  Timed Code Treatment Minutes: 39 Minutes  Minutes: 39          Date: 11/10/2022  Patient Name: Dakota Connors,  Gender:  male        MRN: [id number]  : 1955  (79 y.o.)     Referring Practitioner: Simeon Greenwood MD  Diagnosis: Fall at home, initial encounter  Additional Pertinent Hx: Lencho Multani is a 78-year-old  male with past medical history significant for alcoholism, fatty liver disease,, HTN and TBI with subdural hematoma s/p craniotomy and evacuation in 2021 who presented to [facility] ED for altered mental status from nursing home. Pt was found crawling on the floor, he is altered and combative. Not following commands or answering questions. He has been refusing his meds. Pt did have a recent two week stay in psychiatric facility in [place] though no records available at this time. Pt unable to contribute to his history. MRI, EEG and LP negative. Prior Level of Function:  Type of Home: Facility ([facility])  Home Layout: One level        ADL Assistance: Needs assistance  Ambulation Assistance: Needs assistance  Transfer Assistance: Needs assistance  Additional Comments: Pt from [facility] prior to this admission. Per chart Pt was at a [facility] prior to admission to [facility]. Pt unable to give PLOF info d/t significant confusion.     Restrictions/Precautions:  Restrictions/Precautions: Fall Risk  Position Activity Restriction  Other position/activity restrictions: confusion, high fall risk       SUBJECTIVE: pt cooperative and eager for therapy- he cont to demonstrate impulsivity and decreased safety awareness, however he followed all commands     PAIN: 0/10:       Vitals: Vitals not assessed per clinical judgement, see nursing flowsheet    OBJECTIVE:  Bed Mobility:  Supine to Sit: Stand By Assistance, with verbal cues   Sit to Supine: Stand By Assistance, however he didn't follow cues to scoot to [facility] first and then struggled scooting up in bed once laying down and I had to use the bed to boost him up     Transfers:  Sit to Stand: Minimal Assistance, to CGA- cues for hand placement and for wt shifting forward- noted pt pushing LEs against the surface for initial balance with slight post lean and when he stood from arm chair it scooted a little and he had a loss of balance post   Stand to Sit:Minimal Assistance, cues for hand placement 100% of the time     Ambulation:  Minimal Assistance  Distance: 35x1 and short distances in room   Surface: Level Tile  Device:Rolling Walker  Gait Deviations:  Slow Caprice and noted good heel strike however pt lacking toe off at [place] LEs, noted good base of support and needed cues for safe walker maneuverability when turning around and in narrow spaces - pt walked short distance w/o AD he needed min assist with decreased step length not passing opp foot and arms in high guarded position     Balance:  Pt completed dynamic balance w/ one UE at support reaching out of base with CGA noted a very wide base and when asked to narrow base he struggled- then progressed to no UE at support with min assist for balance and noted trunk sway- pt completed various pre gait and wt shifting activity w/ and w/o UE at support     Exercise:  Patient was guided in 1 set(s) 10 -15reps of exercise to both lower extremities. Long arc quads, Standing heel/toe raises, Standing marches, Standing hip abduction/adduction, Standing hip extension, Standing hamstring curls, Mini squats, and standing w/o UE at support completed upper trunk rotations, shoulder rolls and cervical rotation  . Exercises were completed for increased independence with functional mobility.     Functional Outcome Measures: Completed  Balance Score: 3  Gait Score: 9  Tinetti Total Score: 12  Risk Indicators:  Less than/equal to 18 = high risk  19-23 Moderate risk  Greater than/equal to 24 = low risk      AM-PAC Inpatient Mobility without Stair Climbing Raw Score : 15  AM-PAC Inpatient without Stair Climbing T-Scale Score : 43.03    ASSESSMENT:  Assessment: Patient progressing toward established goals. However pt cont to demonstrate generalized weakness and decreased balance he scored a 12/28 on his tinetti balance test putting him at a high fall risk, pt cont to demonstrate decreased safety awareness and needed hands on assist and several cues at all times, pt would greatly benefit from cont skilled therapy to work on strength, balance, endurance and increased independence with functional mobility   Activity Tolerance:  Patient tolerance of  treatment: fair. Equipment Recommendations:Equipment Needed: No  Discharge Recommendations: Subacte/Skilled Nursing Facility  Plan: Current Treatment Recommendations: Strengthening, Balance training, Functional mobility training, Transfer training, Neuromuscular re-education, Safety education & training, Therapeutic activities, Patient/Caregiver education & training, Gait training  General Plan:  (3-5x GM)    Patient Education  Patient Education: Plan of Care, Transfers, Gait, use of call lt     Goals:  Patient Goals : does not state  Short Term Goals  Time Frame for Short Term Goals: by discharge  Short Term Goal 1: Pt to transfer supine <--> sit SBA to enable pt to get in/out of bed. Short Term Goal 2: Pt to transfer sit <--> stand CGA for increased functional mobility. Short Term Goal 3: amb >10'x1 with RW and CGA to walk safely to bathroom  Long Term Goals  Time Frame for Long Term Goals : NA due to short length of stay. Following session, patient left in safe position with all fall risk precautions in place.

## 2022-11-10 NOTE — CARE COORDINATION
11/10/22, 3:45 PM EST    DISCHARGE PLANNING EVALUATION    Spoke with Tish Bowden at [facility], they will accept patient and start pre-cert.
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
## 2022-11-11 VITALS
RESPIRATION RATE: 17 BRPM | HEART RATE: 69 BPM | OXYGEN SATURATION: 100 % | DIASTOLIC BLOOD PRESSURE: 67 MMHG | HEIGHT: 71 IN | SYSTOLIC BLOOD PRESSURE: 111 MMHG | BODY MASS INDEX: 25.64 KG/M2 | WEIGHT: 183.13 LBS | TEMPERATURE: 98.1 F

## 2022-11-11 LAB
ALBUMIN SERPL-MCNC: 2.4 G/DL (ref 3.5–5.1)
ALP BLD-CCNC: 130 U/L (ref 38–126)
ALT SERPL-CCNC: 24 U/L (ref 11–66)
ANION GAP SERPL CALCULATED.3IONS-SCNC: 9 MEQ/L (ref 8–16)
AST SERPL-CCNC: 38 U/L (ref 5–40)
BASOPHILS # BLD: 0.3 %
BASOPHILS ABSOLUTE: 0 THOU/MM3 (ref 0–0.1)
BILIRUB SERPL-MCNC: 0.4 MG/DL (ref 0.3–1.2)
BUN BLDV-MCNC: 8 MG/DL (ref 7–22)
CALCIUM SERPL-MCNC: 7.9 MG/DL (ref 8.5–10.5)
CHLORIDE BLD-SCNC: 108 MEQ/L (ref 98–111)
CO2: 21 MEQ/L (ref 23–33)
CREAT SERPL-MCNC: 0.9 MG/DL (ref 0.4–1.2)
EOSINOPHIL # BLD: 2 %
EOSINOPHILS ABSOLUTE: 0.1 THOU/MM3 (ref 0–0.4)
ERYTHROCYTE [DISTWIDTH] IN BLOOD BY AUTOMATED COUNT: 16.9 % (ref 11.5–14.5)
ERYTHROCYTE [DISTWIDTH] IN BLOOD BY AUTOMATED COUNT: 53.7 FL (ref 35–45)
GFR SERPL CREATININE-BSD FRML MDRD: > 60 ML/MIN/1.73M2
GLUCOSE BLD-MCNC: 93 MG/DL (ref 70–108)
HCT VFR BLD CALC: 25.1 % (ref 42–52)
HEMOGLOBIN: 7.6 GM/DL (ref 14–18)
IMMATURE GRANS (ABS): 0.01 THOU/MM3 (ref 0–0.07)
IMMATURE GRANULOCYTES: 0.3 %
INFLUENZA A: NOT DETECTED
INFLUENZA B: NOT DETECTED
LYMPHOCYTES # BLD: 24.7 %
LYMPHOCYTES ABSOLUTE: 0.9 THOU/MM3 (ref 1–4.8)
MCH RBC QN AUTO: 26.5 PG (ref 26–33)
MCHC RBC AUTO-ENTMCNC: 30.3 GM/DL (ref 32.2–35.5)
MCV RBC AUTO: 87.5 FL (ref 80–94)
MONOCYTES # BLD: 9.5 %
MONOCYTES ABSOLUTE: 0.3 THOU/MM3 (ref 0.4–1.3)
NUCLEATED RED BLOOD CELLS: 0 /100 WBC
PLATELET # BLD: 127 THOU/MM3 (ref 130–400)
PMV BLD AUTO: 10.8 FL (ref 9.4–12.4)
POTASSIUM SERPL-SCNC: 4.3 MEQ/L (ref 3.5–5.2)
RBC # BLD: 2.87 MILL/MM3 (ref 4.7–6.1)
SARS-COV-2 RNA, RT PCR: NOT DETECTED
SEG NEUTROPHILS: 63.2 %
SEGMENTED NEUTROPHILS ABSOLUTE COUNT: 2.2 THOU/MM3 (ref 1.8–7.7)
SODIUM BLD-SCNC: 138 MEQ/L (ref 135–145)
TOTAL PROTEIN: 5.3 G/DL (ref 6.1–8)
WBC # BLD: 3.5 THOU/MM3 (ref 4.8–10.8)

## 2022-11-11 PROCEDURE — 6360000002 HC RX W HCPCS: Performed by: NURSE PRACTITIONER

## 2022-11-11 PROCEDURE — 97530 THERAPEUTIC ACTIVITIES: CPT

## 2022-11-11 PROCEDURE — 6370000000 HC RX 637 (ALT 250 FOR IP): Performed by: INTERNAL MEDICINE

## 2022-11-11 PROCEDURE — 6370000000 HC RX 637 (ALT 250 FOR IP): Performed by: STUDENT IN AN ORGANIZED HEALTH CARE EDUCATION/TRAINING PROGRAM

## 2022-11-11 PROCEDURE — 80053 COMPREHEN METABOLIC PANEL: CPT

## 2022-11-11 PROCEDURE — 99239 HOSP IP/OBS DSCHRG MGMT >30: CPT | Performed by: FAMILY MEDICINE

## 2022-11-11 PROCEDURE — 36415 COLL VENOUS BLD VENIPUNCTURE: CPT

## 2022-11-11 PROCEDURE — 97129 THER IVNTJ 1ST 15 MIN: CPT | Performed by: SPEECH-LANGUAGE PATHOLOGIST

## 2022-11-11 PROCEDURE — 6370000000 HC RX 637 (ALT 250 FOR IP): Performed by: PSYCHIATRY & NEUROLOGY

## 2022-11-11 PROCEDURE — 87636 SARSCOV2 & INF A&B AMP PRB: CPT

## 2022-11-11 PROCEDURE — 85025 COMPLETE CBC W/AUTO DIFF WBC: CPT

## 2022-11-11 PROCEDURE — 97535 SELF CARE MNGMENT TRAINING: CPT

## 2022-11-11 RX ORDER — LORAZEPAM 0.5 MG/1
0.5 TABLET ORAL 3 TIMES DAILY PRN
Qty: 20 TABLET | Refills: 0 | Status: SHIPPED | OUTPATIENT
Start: 2022-11-11 | End: 2022-11-11 | Stop reason: SDUPTHER

## 2022-11-11 RX ORDER — LANOLIN ALCOHOL/MO/W.PET/CERES
3 CREAM (GRAM) TOPICAL NIGHTLY
Qty: 30 TABLET | Refills: 0 | DISCHARGE
Start: 2022-11-11

## 2022-11-11 RX ORDER — CARVEDILOL 6.25 MG/1
6.25 TABLET ORAL 2 TIMES DAILY
Qty: 60 TABLET | Refills: 1 | DISCHARGE
Start: 2022-11-11

## 2022-11-11 RX ORDER — TRAZODONE HYDROCHLORIDE 50 MG/1
50 TABLET ORAL NIGHTLY PRN
Qty: 30 TABLET | Refills: 0 | DISCHARGE
Start: 2022-11-11

## 2022-11-11 RX ORDER — LORAZEPAM 1 MG/1
0.5 TABLET ORAL EVERY 8 HOURS PRN
Refills: 0 | Status: SHIPPED | DISCHARGE
Start: 2022-11-11 | End: 2022-11-11 | Stop reason: HOSPADM

## 2022-11-11 RX ORDER — RISPERIDONE 0.5 MG/1
0.5 TABLET ORAL 2 TIMES DAILY
Qty: 60 TABLET | Refills: 0 | DISCHARGE
Start: 2022-11-11

## 2022-11-11 RX ORDER — RISPERIDONE 1 MG/1
1 TABLET ORAL NIGHTLY
Qty: 30 TABLET | Refills: 0 | DISCHARGE
Start: 2022-11-11

## 2022-11-11 RX ORDER — LORAZEPAM 0.5 MG/1
0.5 TABLET ORAL 3 TIMES DAILY PRN
Qty: 20 TABLET | Refills: 0 | Status: SHIPPED | OUTPATIENT
Start: 2022-11-11 | End: 2022-11-18

## 2022-11-11 RX ADMIN — CARVEDILOL 6.25 MG: 6.25 TABLET, FILM COATED ORAL at 08:52

## 2022-11-11 RX ADMIN — AMLODIPINE BESYLATE 10 MG: 10 TABLET ORAL at 08:52

## 2022-11-11 RX ADMIN — FOLIC ACID 1 MG: 1 TABLET ORAL at 08:52

## 2022-11-11 RX ADMIN — PANTOPRAZOLE SODIUM 40 MG: 40 TABLET, DELAYED RELEASE ORAL at 08:52

## 2022-11-11 RX ADMIN — Medication 100 MG: at 08:51

## 2022-11-11 RX ADMIN — ENOXAPARIN SODIUM 40 MG: 100 INJECTION SUBCUTANEOUS at 08:52

## 2022-11-11 RX ADMIN — Medication 1 TABLET: at 08:52

## 2022-11-11 RX ADMIN — POTASSIUM CHLORIDE 20 MEQ: 1500 TABLET, EXTENDED RELEASE ORAL at 08:51

## 2022-11-11 RX ADMIN — POTASSIUM CHLORIDE 20 MEQ: 1500 TABLET, EXTENDED RELEASE ORAL at 12:45

## 2022-11-11 RX ADMIN — RISPERIDONE 0.5 MG: 1 TABLET ORAL at 08:52

## 2022-11-11 NOTE — PROGRESS NOTES
[address] [facility]  [facility] ICU STEPDOWN TELEMETRY 4K  DAILY NOTE    TIME   SLP Individual Minutes  Time In: 2915  Time Out: 1330  Minutes: 16  Timed Code Treatment Minutes: 0 Minutes     Date: 2022  Patient Name: Noah Estrada      CSN: [id number]   : 1955  (79 y.o.)  Gender: male   Referring Physician:  Marilee Ng MD  Diagnosis: Altered Mental Status  Precautions:  Fall risk, seizure precautions  Current Diet: Minced and moist solids. Thin liquids. Swallowing Strategies:  Full Upright Position, Small Bite/Sip, Pulmonary Monitoring, Medications Whole with Puree, Direct 1:1 Supervision, Limit Distractions, and Monitor for Fatigue   Date of Last MBS/FEES:  MBS 22    Pain:  No pain reported. Subjective: Patient seen with RN Vivi Hyatt approval.  Patient is scheduled to be discharged within the hour. Short-Term Goals:  SHORT TERM GOAL #1:  Goal 1: Patient will consume a minced and moist diet with thin liquids with 1:1 assistance d/t waxing/waning mentation with no pulmonary decline and adequate endurance to assist with meeting nutrition/hydration needs. - GOAL MET. REVISED GOAL:  Patient will consume a soft and bite sized diet with thin liquids without overt difficulty and use of swallowing strategies with min cues to assist with meeting nutrition/hydration needs. INTERVENTIONS: Last ST session it was recommended that the patient's diet be advanced to a soft and bite sized diet, however, the order for it was not put in the system. Patient and RN report no overt difficulty with a minced and moist diet. Patient reports he is taking his pills whole with puree (mostly yogurt) without overt difficulty. Given that the patient is being discharged to [facility] in [place] this date, recommend ST to evaluate swallowing so they can recommend the safest diet when he arrives to their facility. Patient is aware of this plan and he verbalized understanding.   Education completed on swallowing strategies and patient verbalized understanding. SHORT TERM GOAL #2:  Goal 2: Patient will complete advanced PO trials with ST ONLY  as clinically indicated to determine readiness for potential diet advancement. INTERVENTIONS: did not test this date d/t focus on other goals    SHORT TERM GOAL #3:  Goal 3: Patient will complete functional carryover tasks (orientation, biographics, call light) with 60% accuracy, mod cues to improve retention of functional information. INTERVENTIONS:  Orientation:  JANET - min cues  Date - independent  Month - independent  Year - independent  [facility] - independent  Time - independent with use of clock in room  Facility going to: independent    SHORT TERM GOAL #4:  Goal 4: Patient will complete delayed/immediate recall tasks and functional memory tasks with focus on memory strategies with 50% accuracy, max cues to improve retention of pertinant information. INTERVENTIONS: Patient asked to recall 3 functional errands:   - immediate recall: 3/3 independent   - 4 minute delay: 2/3 independent, 1/3 with min cues    SHORT TERM GOAL #5:  Goal 5: Patient will complete safety, problem solving, verbal/visual reasoning, and executive functioning tasks (meds, math/money/finance, time) with 60% accuracy, mod cues to improve contributions to daily ADLS/IADLS. INTERVENTIONS:   Use of call light for nurse: independent    Safety in room:  patient verbalized that he is allowed to get up independently in his room, however, the board in his room said that he has a bed alarm and he is stand by assist.    Problem solving task:   - medicine: min cues   - fire: independent    Time task:  Patient was provided with functional situations given 2 times of the day and asked to figure out the difference between the 2 times (ie: you leave home at 1:00 and you have an appointment at 2:15, how much time do you have until you have to be at your appointment).   Patient completed 1/4 of the trials without cues, 3/4 with min cues. **In the middle of this task, the patient stated, \"These are things that get you aggravated. \" Following this, this ST educated the patient on rationale for this task as figuring out how long it takes to do something or get somewhere is part of our daily life. Patient then agreed. SHORT TERM GOAL #6:  Goal 6: Patient will complete thought organization(confrontational, divergent naming) and sequencing tasks with 60% accuracy, mod cues to improve mental flexibility for daily living scenarios. INTERVENTIONS: Did not directly address due to focus on other goals. Long-Term Goals:   No established LTGs due to short ELOS. EDUCATION:  Learner: Patient  Education:  Reviewed diet and strategies, Reviewed ST goals and Plan of Care, Education Related to [employer] and Wellness, and rationale for tasks completed  Evaluation of Education: Demonstrates with assistance, Needs further instruction, and Family not present    ASSESSMENT/PLAN:  Activity Tolerance:  Patient tolerance of  treatment: good. Assessment/Plan: Patient progressing toward established goals. Continues to require skilled care of licensed speech pathologist to progress toward achievement of established goals and plan of care. .     Plan for Next Session: cognitive tasks, advanced texture trials  Discharge Recommendations: SNF       ZEUS Mcbride Ser [id number]

## 2022-11-11 NOTE — CARE COORDINATION
11/11/22, 10:57 AM EST    DISCHARGE PLANNING EVALUATION    Eusebio Mcgee from [facility] called, pre-cert approved. Advised will discharge today. Will update with time. Spoke with patient and sister, informed of pre-cert approval and discharge. Brother not able to transport today. Will arrange ambulette, scheduled for 1:30.    11/11/22, 12:11 PM EST    Patient goals/plan/ treatment preferences discussed by  and . Patient goals/plan/ treatment preferences reviewed with patient/ family. Patient/ family verbalize understanding of discharge plan and are in agreement with goal/plan/treatment preferences. Understanding was demonstrated using the teach back method. AVS provided by RN at time of discharge, which includes all necessary medical information pertaining to the patients current course of illness, treatment, post-discharge goals of care, and treatment preferences. Services At/After Discharge: [facility] ([facility]), In [facility], Nursing service, OT, and PT       IMM Letter  IMM Letter given to Patient/Family/Significant other/Guardian/POA/by[de-identified] BEBE Cha  IMM Letter date given[de-identified] 11/11/22  IMM Letter time given[de-identified] 1020     Patient discharged to [facility] skilled medicare bed. Eusebio Mcgee at [facility] informed of discharge, 1:30 transport. Faxed AVS and MAR. Rn aware to jeannette report.
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
## 2022-11-11 NOTE — DISCHARGE SUMMARY
DISCHARGE SUMMARY      Patient Identification:   Johan Summers   : 1955  MRN: [id number]   Account: [de-identified]      Patient's PCP: Francy Quiorga Date: 10/29/2022     Discharge Date: 2022     Admitting Physician: Marianne Dempsey MD     Discharge Physician: Julio C Abreu MD     Discharge Diagnoses:    Metabolic encephalopathy  -Encephalopathy multifactorial with history of falls, TBI, right subdural hematoma 1 year ago as well as suspected CVA from reaction to anesthesia 1 month ago.    -evaluated by Neuro and Psych who agreed with team that this is most likely Wernicke encephalopathy.   -Psychiatry consulted started patient on Risperdal 0.5mg bid, and Risperdal 1mg nightly as well as Remeron 15mg nightly and trazodone 50mg nightly prn for sleep. Aspiration pneumonia, resolved  -s/p ceftriaxone transitioned to oral augmentin Last dose  at 0900. Hypertension, improved  Hyperosmolar hyperchloremia hypernatremia, resolved  Hypokalemia, resolved  Non anion gap metabolic acidosis   Pancytopenia, chronic  EtOH abuse, chronic   HENRY on CKD stage II, resolved  Tobacco use disorder  Prior ischemic CVA   Hx of TBI/subdural hematoma 2/2 trauma   Hx of substance abuse disorder  Dysphagia 2/2 encephalopathy, improved  Deconditioning, unsteady gait    The patient was seen and examined on day of discharge and this discharge summary is in conjunction with any daily progress note from day of discharge. Hospital Course:    Johan Summers is a 79 y.o. male admitted to [address] [place] [address] on 10/29/2022 for altered mental status/fall.    79 y.o. 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 Patient was previously admitted to [address] [place] [address] and had requested his Haldol to be stopped. Patient was then admitted to the [facility] a convalescent home post psychiatric unit stay. Other notable admissions was an admission at [facility] 1 month ago for GI bleed where he had an EGD and colonoscopy. Family at that time reported that when he woke up from anesthesia from the endoscopic as he was altered and this has persisted since that time. At the time, it was believed that he had a CVA during the procedure. He was discharged to an ECF for further rehab and therapy due to some residual left-sided weakness from a skull fracture and subarachnoid hemorrhage approximately 1 year prior. They reported about 5 or 6 falls and stated went to the ECF with the most recent occurring the morning of admission. She had been ambulating independently without any use of assistive devices but was too weak on that day to assist himself in getting out. This prompted the ED admission from the [facility]. The fall that had occurred was early in the morning and unwitnessed. The family states that the patient has been on Haldol to help with recurrent agitation however feel that this medication is not working/making his agitation worse. They state that the patient has been between the ECF in the psychiatric facility and during this, has not been receiving much food or water. They report intermittent epistaxis which was not present on the morning of the fall. They deny any recent infectious symptoms like fever or chills or cough. On admission, they brought up that a sister did have concern that this could be Warnicke Korsakoff syndrome. The family believes that the patient behaved similarly in the past due to issues with alcohol intake however no specific diagnosis was able to be obtained.      Prior to admission, the family have visited the patient on 10/28 and stated that he drink 2 quarts of soda/juice and was very thirsty. Per the LTAC, he had crawl out of bed and was found on the floor and became very agitated when staff tried to get him up. The patient at that time was not able to respond to questions appropriately or consistently and would not open eyes when asked. CT of the head was negative for any acute hemorrhage. Neurology was consulted on arrival 10/29. Patient was subsequently transferred to the ICU for elective intubation for LP and MRI due to agitation. On 10/31, the patient did have a brief episode of hypotension overnight which was responsive to IVF. Patient underwent lumbar puncture which was wnl. He also had an MRI EEG performed which also were wnl. He was deemed stable for transport down to the medical floor. On 10/31 he remained NPO while speech therapy evaluated. Neurology states that they believe the diagnosis to be Wernicke encephalopathy and signed off the case. Throughout admission patient was treated for aspiration pneumonia with antibiotics. Both his cough and mentation continued to improve throughout admission. He did have times of agitation, especially at night. On 11/2 psych was consulted and started patient on remeron, risperdal, and trazodone. Patient's mentation greatly improved after that. His sleep improved as well. His home blood pressure regimen was restarted, however hydralazine was held and ultimately stopped due to lower blood pressures. He was discharged home on amlodipine and coreg. Patient's diet was advanced on 11/2 as he was re-evaluated by speech therapy as mentation improved. PT/OT was consulted to evaluate and treat. PT/OT, family, and patient felt that nursing home was the best option for the patient's home going needs. On day of discharge he was alert and oriented x4, he was off antibiotics, and restarted on his antihypertensives. He denied any other complaints.  Appropriate follow-ups and recommendations were in place. Exam:     Vitals:  Vitals:    11/10/22 2354 11/11/22 0345 11/11/22 0544 11/11/22 0806   BP: (!) 102/54 (!) 109/57  111/67   Pulse: 85 83  69   Resp: 18 17  17   Temp: 98.6 °F (37 °C) 98.2 °F (36.8 °C)  98.1 °F (36.7 °C)   TempSrc: Oral Oral  Oral   SpO2: 95% 95%  100%   Weight:   183 lb 2 oz (83.1 kg)    Height:         Weight: Weight: 183 lb 2 oz (83.1 kg)     24 hour intake/output:  Intake/Output Summary (Last 24 hours) at 11/11/2022 1410  Last data filed at 11/11/2022 0249  Gross per 24 hour   Intake 100 ml   Output 180 ml   Net -80 ml       General appearance: No apparent distress. Alert and oriented x3  HEENT:  Normal cephalic, atraumatic without obvious deformity. Extra ocular muscles intact. Conjunctivae/corneas clear. Neck: No jugular venous distention. Trachea midline. Respiratory: Normal respiratory effort, clear to auscultation bilaterally  Cardiovascular:  Regular rate and rhythm with normal S1/S2 without murmurs, rubs or gallops. Abdomen: Soft, non-tender, non-distended with normal bowel sounds. Musculoskeletal:  No clubbing, cyanosis or edema bilaterally. Skin: No rashes or lesions. Neurologic: appears to be NV intact  Psychiatric: Alert  Peripheral Pulses: +2 palpable, equal bilaterally       Labs:  For convenience and continuity at follow-up the following most recent labs are provided:      CBC:    Lab Results   Component Value Date/Time    WBC 3.5 11/11/2022 05:37 AM    HGB 7.6 11/11/2022 05:37 AM    HCT 25.1 11/11/2022 05:37 AM     11/11/2022 05:37 AM       Renal:    Lab Results   Component Value Date/Time     11/11/2022 05:37 AM    K 4.3 11/11/2022 05:37 AM    K 3.3 10/29/2022 08:52 AM     11/11/2022 05:37 AM    CO2 21 11/11/2022 05:37 AM    BUN 8 11/11/2022 05:37 AM    CREATININE 0.9 11/11/2022 05:37 AM    CALCIUM 7.9 11/11/2022 05:37 AM    PHOS 3.5 07/03/2012 06:50 AM         Significant Diagnostic Studies    Radiology:   XR CHEST PORTABLE   Final Result   Impression:      No acute processes. This document has been electronically signed by: Romero Nguyen MD on    11/06/2022 01:39 AM      XR CHEST PORTABLE   Final Result      No acute disease. This document has been electronically signed by: Carline Calvert MD on    11/05/2022 04:20 AM      XR CHEST PORTABLE   Final Result      No significant interval change. This document has been electronically signed by: Carline Calvert MD on    11/04/2022 05:49 AM      XR CHEST PORTABLE   Final Result      No significant interval change. This document has been electronically signed by: Carline Calvert MD on    11/03/2022 07:50 AM      XR CHEST PORTABLE   Final Result      No acute pulmonary disease. Right acromioclavicular joint separation. Right shoulder calcific tendinitis. This document has been electronically signed by: Carline Calvert MD on    11/02/2022 04:10 AM      FL MODIFIED BARIUM SWALLOW W VIDEO   Final Result   1. Laryngeal penetration of thin barium without evidence of aspiration. 2. Additional recommendations from the speech therapist will follow. **This report has been created using voice recognition software. It may contain minor errors which are inherent in voice recognition technology. **         Final report electronically signed by Dr. Farrah Arias on 11/1/2022 11:05 AM      XR CHEST PORTABLE   Final Result   Impression:      No acute processes. This document has been electronically signed by: Romero Nguyen MD on    11/01/2022 02:06 AM      XR CHEST PORTABLE   Final Result   1. Interval removal of the endotracheal tube. 2. Borderline cardiomegaly. 3. Slight increased density at both lung bases. 4. Atherosclerotic calcification in the aortic arch. .               **This report has been created using voice recognition software. It may contain minor errors which are inherent in voice recognition technology. ** Final report electronically signed by DR Aubrie Nogueira on 10/31/2022 8:12 AM      IR LUMBAR PUNCTURE FOR DIAGNOSIS   Final Result   Status post successful lumbar puncture. **This report has been created using voice recognition software. It may contain minor errors which are inherent in voice recognition technology. **      Final report electronically signed by Dr. Burton Ruiz on 10/30/2022 3:38 PM      XR CHEST PORTABLE   Final Result   Impression:   Satisfactory position of ET tube. This document has been electronically signed by: Brina Wheeler MD on    10/29/2022 09:13 PM      MRI BRAIN WO CONTRAST   Final Result       1. No acute findings. 2. Areas of volume loss and abnormal signal in the inferior right frontal lobe and the right parietal lobe. Both these areas have associated prior hemorrhage. These could be posttraumatic injuries versus sites of old infarct. These do not appear acute. 3. Mild severity chronic small vessel ischemic changes. **This report has been created using voice recognition software. It may contain minor errors which are inherent in voice recognition technology. **      Final report electronically signed by Dr. Dileep Singh on 10/30/2022 7:12 AM      CT HEAD WO CONTRAST   Final Result   1. No mass affect or acute hemorrhage. 2. Chronic periventricular small vessel ischemic changes and cerebral atrophy. **This report has been created using voice recognition software. It may contain minor errors which are inherent in voice recognition technology. **      Final report electronically signed by Dr. Markus Dukes on 10/29/2022 9:44 AM      CT FACIAL BONES WO CONTRAST   Final Result      No facial bone fracture. Final report electronically signed by Dr. Markus Dukes on 10/29/2022 9:53 AM      CT CERVICAL SPINE WO CONTRAST   Final Result   1. No fracture or spondylolisthesis of the cervical spine.    2. Multilevel degenerative disc disease, neural foraminal narrowing and central canal stenosis as detailed above. Final report electronically signed by Dr. Gloria Bell on 10/29/2022 9:50 AM      XR TIBIA FIBULA LEFT (2 VIEWS)   Final Result      No fracture or dislocation. Final report electronically signed by Dr. Gloria Bell on 10/29/2022 9:17 AM      XR CHEST PORTABLE   Final Result      No acute intrathoracic process. **This report has been created using voice recognition software. It may contain minor errors which are inherent in voice recognition technology. **      Final report electronically signed by Dr. Gloria Bell on 10/29/2022 9:14 AM             Consults:     IP CONSULT TO SOCIAL WORK  IP CONSULT TO NEUROLOGY  IP CONSULT TO PSYCHIATRY  IP CONSULT TO NEPHROLOGY    Disposition:    [] Home       [] TCU       [] Rehab       [] Psych       [x] SNF       [] [place]       [] Other-    Condition at Discharge: Stable    Code Status:  Full Code     Patient Instructions:    Discharge lab work: n/a  Activity: activity as tolerated  Diet: ADULT DIET; Dysphagia - Minced and Moist  ADULT ORAL NUTRITION SUPPLEMENT; Breakfast, Lunch, Dinner; Standard High Calorie/High Protein Oral Supplement      Follow-up visits:   Jeniffer Galeas MD  [address] [place] [address],[facility] Floor [facility] [phone]    Follow up in 1 month(s)      Isidra Whitley [id number]  [phone]           Discharge Medications:        Medication List        START taking these medications      LORazepam 0.5 MG tablet  Commonly known as: ATIVAN  Take 1 tablet by mouth 3 times daily as needed for Anxiety for up to 7 days.      melatonin 3 MG Tabs tablet  Take 1 tablet by mouth at bedtime     * risperiDONE 0.5 MG tablet  Commonly known as: RISPERDAL  Take 1 tablet by mouth 2 times daily     * risperiDONE 1 MG tablet  Commonly known as: RISPERDAL  Take 1 tablet by mouth nightly traZODone 50 MG tablet  Commonly known as: DESYREL  Take 1 tablet by mouth nightly as needed for Sleep           * This list has 2 medication(s) that are the same as other medications prescribed for you. Read the directions carefully, and ask your doctor or other care provider to review them with you. CONTINUE taking these medications      amLODIPine 10 MG tablet  Commonly known as: NORVASC  Take 1 tablet by mouth daily     carvedilol 6.25 MG tablet  Commonly known as: COREG  Take 1 tablet by mouth 2 times daily     folic acid 1 MG tablet  Commonly known as: FOLVITE     mirtazapine 15 MG tablet  Commonly known as: REMERON     naltrexone 50 MG tablet  Commonly known as: DEPADE     nicotine 21 MG/24HR  Commonly known as: NICODERM CQ     pantoprazole 40 MG tablet  Commonly known as: PROTONIX     vitamin B-1 100 MG tablet  Commonly known as: THIAMINE            STOP taking these medications      divalproex 125 MG capsule  Commonly known as: DEPAKOTE SPRINKLE     propranolol 20 MG tablet  Commonly known as: INDERAL     therapeutic multivitamin-minerals tablet     topiramate 50 MG tablet  Commonly known as: TOPAMAX               Where to Get Your Medications        You can get these medications from any pharmacy    Bring a paper prescription for each of these medications  LORazepam 0.5 MG tablet       Information about where to get these medications is not yet available    Ask your nurse or doctor about these medications  carvedilol 6.25 MG tablet  melatonin 3 MG Tabs tablet  risperiDONE 0.5 MG tablet  risperiDONE 1 MG tablet  traZODone 50 MG tablet         Time Spent on discharge is more than 30 minutes in the examination, evaluation, counseling and review of medications and discharge plan. Signed: Thank you Marco Claros for the opportunity to be involved in this patient's care.     Electronically signed by Hong Baldwin MD on 11/11/2022 at 2:10 PM
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
## 2022-11-11 NOTE — PROGRESS NOTES
11/10/22 1900   Encounter Summary   Encounter Overview/Reason  Initial Encounter   Service Provided For: Patient   Referral/Consult From: [facility]   Support System Unknown   Last Encounter  11/10/22   Complexity of Encounter Low   Begin Time 1800   End Time  1830   Total Time Calculated 30 min   Encounter    Type Initial Screen/Assessment   Assessment/Intervention/Outcome   Assessment Peaceful   Intervention Active listening;Prayer (assurance of)/[place]   Outcome Acceptance;Comfort   Plan and Referrals   Plan/Referrals Continue to visit, (comment)      Rev. Cuello:    ASSESSMENT    P was in good spirits. He warmly welcomed me to talk to him. His condition has been diagnosed as altered mental status. At the root of the problem is alcoholism and related habits. He told me that he raised three great kids who are doing well. He told me when he gets better after the rehab, he might even visit my Sikhism! He has a good sense of humor. INTERVENTION    I engaged him in conversation about God and the need for trusting God. He wanted me to pray for him, which I did. Overall, our conversation was good and he enjoyed talking to me. OUTCOME    Expressed gratitude for the visit. He spoke many things about God and [facility].     CARE PLAN  Continue to help him.

## 2022-11-11 NOTE — PLAN OF CARE
Problem: Discharge Planning  Goal: Discharge to home or other facility with appropriate resources  11/11/2022 1249 by Christina Bentley RN  Outcome: Adequate for Discharge  11/11/2022 0536 by Ju Grant RN  Outcome: Progressing  Flowsheets (Taken 11/10/2022 1945)  Discharge to home or other facility with appropriate resources: Identify barriers to discharge with patient and caregiver     Problem: Confusion  Goal: Confusion, delirium, dementia, or psychosis is improved or at baseline  11/11/2022 1249 by Christina Bentley RN  Outcome: Adequate for Discharge  11/11/2022 0536 by Ju Grant RN  Outcome: Progressing     Problem: Skin/Tissue Integrity  Goal: Absence of new skin breakdown  11/11/2022 1249 by Christina Bentley RN  Outcome: Adequate for Discharge  11/11/2022 0536 by Ju Grant RN  Outcome: Progressing     Problem: ABCDS Injury Assessment  Goal: Absence of physical injury  11/11/2022 1249 by Christina Bentley RN  Outcome: Adequate for Discharge  11/11/2022 0536 by Ju Grant RN  Outcome: Progressing  Flowsheets (Taken 11/10/2022 1945)  Absence of Physical Injury: Implement safety measures based on patient assessment     Problem: Safety - Adult  Goal: Free from fall injury  11/11/2022 1249 by Christina Bentley RN  Outcome: Adequate for Discharge  11/11/2022 0536 by Ju Grant RN  Outcome: Progressing  [address] [facility] [address] (Taken 11/10/2022 1945)  Free From Fall Injury: Instruct family/caregiver on patient safety     Problem: Pain  Goal: Verbalizes/displays adequate comfort level or baseline comfort level  Recent Flowsheet Documentation  Taken 11/11/2022 0345 by Ju Grant RN  Verbalizes/displays adequate comfort level or baseline comfort level:   Encourage patient to monitor pain and request assistance   Assess pain using appropriate pain scale   Administer analgesics based on type and severity of pain and evaluate response  Taken 11/10/2022 0432 by Ju Grant RN  Verbalizes/displays adequate comfort level or baseline comfort level:   Encourage patient to monitor pain and request assistance   Assess pain using appropriate pain scale   Administer analgesics based on type and severity of pain and evaluate response     Problem: Nutrition Deficit:  Goal: Optimize nutritional status  11/11/2022 1249 by Christina Bentley RN  Outcome: Adequate for Discharge  11/11/2022 0536 by Ju Grant RN  Outcome: Progressing

## 2022-11-11 NOTE — PLAN OF CARE
Problem: Discharge Planning  Goal: Discharge to home or other facility with appropriate resources  Outcome: Progressing  Flowsheets (Taken 11/10/2022 1945)  Discharge to home or other facility with appropriate resources: Identify barriers to discharge with patient and caregiver     Problem: Confusion  Goal: Confusion, delirium, dementia, or psychosis is improved or at baseline  Description: INTERVENTIONS:  1. Assess for possible contributors to thought disturbance, including medications, impaired vision or hearing, underlying metabolic abnormalities, dehydration, psychiatric diagnoses, and notify attending LIP  2. [place] high risk fall precautions, as indicated  3. Provide frequent short contacts to provide reality reorientation, refocusing and direction  4. Decrease environmental stimuli, including noise as appropriate  5. Monitor and intervene to maintain adequate nutrition, hydration, elimination, sleep and activity  6. If unable to ensure safety without constant attention obtain sitter and review sitter guidelines with assigned personnel  7. Initiate Psychosocial CNS and Spiritual Care consult, as indicated  Outcome: Progressing     Problem: Skin/Tissue Integrity  Goal: Absence of new skin breakdown  Description: 1. Monitor for areas of redness and/or skin breakdown  2. Assess vascular access sites hourly  3. Every 4-6 hours minimum:  Change oxygen saturation probe site  4. Every 4-6 hours:  If on nasal continuous positive airway pressure, respiratory therapy assess nares and determine need for appliance change or resting period.   Outcome: Progressing     Problem: ABCDS Injury Assessment  Goal: Absence of physical injury  Outcome: Progressing  Flowsheets (Taken 11/10/2022 1945)  Absence of Physical Injury: Implement safety measures based on patient assessment     Problem: Safety - Adult  Goal: Free from fall injury  Outcome: Progressing  Flowsheets (Taken 11/10/2022 1945)  Free From Fall Injury: Instruct family/caregiver on patient safety     Problem: Nutrition Deficit:  Goal: Optimize nutritional status  Outcome: Progressing

## 2024-03-24 ENCOUNTER — HOSPITAL ENCOUNTER (EMERGENCY)
Age: 69
Discharge: HOME | End: 2024-03-24
Payer: MEDICARE

## 2024-03-24 VITALS
BODY MASS INDEX: 29.12 KG/M2 | DIASTOLIC BLOOD PRESSURE: 72 MMHG | HEIGHT: 71 IN | RESPIRATION RATE: 16 BRPM | TEMPERATURE: 98.1 F | OXYGEN SATURATION: 95 % | SYSTOLIC BLOOD PRESSURE: 143 MMHG | WEIGHT: 208 LBS | HEART RATE: 72 BPM

## 2024-03-24 DIAGNOSIS — L03.032 CELLULITIS OF TOE OF LEFT FOOT: ICD-10-CM

## 2024-03-24 DIAGNOSIS — B35.1 ONYCHOMYCOSIS: Primary | ICD-10-CM

## 2024-03-24 PROCEDURE — 99283 EMERGENCY DEPT VISIT LOW MDM: CPT

## 2024-03-24 RX ORDER — NAPROXEN 500 MG/1
500 TABLET ORAL 2 TIMES DAILY
Qty: 20 TABLET | Refills: 0 | Status: SHIPPED | OUTPATIENT
Start: 2024-03-24

## 2024-03-24 RX ORDER — CEPHALEXIN 500 MG/1
500 CAPSULE ORAL 4 TIMES DAILY
Qty: 40 CAPSULE | Refills: 0 | Status: SHIPPED | OUTPATIENT
Start: 2024-03-24 | End: 2024-04-03

## 2024-03-24 ASSESSMENT — PAIN - FUNCTIONAL ASSESSMENT: PAIN_FUNCTIONAL_ASSESSMENT: 0-10

## 2024-03-24 ASSESSMENT — PAIN DESCRIPTION - LOCATION: LOCATION: FOOT

## 2024-03-24 ASSESSMENT — PAIN SCALES - GENERAL: PAINLEVEL_OUTOF10: 8

## 2024-03-24 ASSESSMENT — PAIN DESCRIPTION - ORIENTATION: ORIENTATION: LEFT

## 2024-03-24 NOTE — ED TRIAGE NOTES
Pt presents to ED with left foot pain that began 3 months ago. Pt states that the pain is worse with walking and it is sensitive to touch. Pt rates pain 8/10 currently. Pt reports that he has not taken anything for this at home.
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
## 2024-06-20 ENCOUNTER — APPOINTMENT (OUTPATIENT)
Dept: GENERAL RADIOLOGY | Age: 69
End: 2024-06-20
Payer: MEDICARE

## 2024-06-20 ENCOUNTER — HOSPITAL ENCOUNTER (EMERGENCY)
Age: 69
Discharge: HOME | End: 2024-06-21
Attending: EMERGENCY MEDICINE
Payer: MEDICARE

## 2024-06-20 ENCOUNTER — APPOINTMENT (OUTPATIENT)
Dept: CT IMAGING | Age: 69
End: 2024-06-20
Payer: MEDICARE

## 2024-06-20 VITALS
OXYGEN SATURATION: 98 % | TEMPERATURE: 98.5 F | SYSTOLIC BLOOD PRESSURE: 143 MMHG | DIASTOLIC BLOOD PRESSURE: 99 MMHG | WEIGHT: 208 LBS | HEART RATE: 82 BPM | BODY MASS INDEX: 29.12 KG/M2 | RESPIRATION RATE: 18 BRPM | HEIGHT: 71 IN

## 2024-06-20 DIAGNOSIS — I81 PORTAL VEIN THROMBOSIS: Primary | ICD-10-CM

## 2024-06-20 DIAGNOSIS — K70.31 ALCOHOLIC CIRRHOSIS OF LIVER WITH ASCITES (HCC): ICD-10-CM

## 2024-06-20 LAB
ALBUMIN SERPL BCG-MCNC: 3.2 G/DL (ref 3.5–5.1)
ALP SERPL-CCNC: 299 U/L (ref 38–126)
ALT SERPL W/O P-5'-P-CCNC: 46 U/L (ref 11–66)
AMPHETAMINES UR QL SCN: NEGATIVE
ANION GAP SERPL CALC-SCNC: 11 MEQ/L (ref 8–16)
AST SERPL-CCNC: 123 U/L (ref 5–40)
BARBITURATES UR QL SCN: NEGATIVE
BASOPHILS ABSOLUTE: 0 THOU/MM3 (ref 0–0.1)
BASOPHILS NFR BLD AUTO: 0.3 %
BENZODIAZ UR QL SCN: NEGATIVE
BILIRUB CONJ SERPL-MCNC: 0.5 MG/DL (ref 0.1–13.8)
BILIRUB SERPL-MCNC: 0.8 MG/DL (ref 0.3–1.2)
BILIRUB UR QL STRIP.AUTO: NEGATIVE
BUN SERPL-MCNC: 11 MG/DL (ref 7–22)
BZE UR QL SCN: NEGATIVE
CALCIUM SERPL-MCNC: 8.8 MG/DL (ref 8.5–10.5)
CANNABINOIDS UR QL SCN: NEGATIVE
CHARACTER UR: CLEAR
CHLORIDE SERPL-SCNC: 104 MEQ/L (ref 98–111)
CO2 SERPL-SCNC: 21 MEQ/L (ref 23–33)
COLOR: YELLOW
CREAT SERPL-MCNC: 0.8 MG/DL (ref 0.4–1.2)
DEPRECATED RDW RBC AUTO: 54.9 FL (ref 35–45)
EOSINOPHIL NFR BLD AUTO: 2.7 %
EOSINOPHILS ABSOLUTE: 0.1 THOU/MM3 (ref 0–0.4)
ERYTHROCYTE [DISTWIDTH] IN BLOOD BY AUTOMATED COUNT: 17.8 % (ref 11.5–14.5)
ETHANOL SERPL-MCNC: < 0.01 % (ref 0–0.08)
FENTANYL: NEGATIVE
GFR SERPL CREATININE-BSD FRML MDRD: > 90 ML/MIN/1.73M2
GLUCOSE SERPL-MCNC: 102 MG/DL (ref 70–108)
GLUCOSE UR QL STRIP.AUTO: NEGATIVE MG/DL
HCT VFR BLD AUTO: 34.9 % (ref 42–52)
HGB BLD-MCNC: 11.1 GM/DL (ref 14–18)
HGB UR QL STRIP.AUTO: NEGATIVE
IMM GRANULOCYTES # BLD AUTO: 0 THOU/MM3 (ref 0–0.07)
IMM GRANULOCYTES NFR BLD AUTO: 0 %
INR PPP: 1.23 (ref 0.85–1.13)
KETONES UR QL STRIP.AUTO: NEGATIVE
LIPASE SERPL-CCNC: 41.8 U/L (ref 5.6–51.3)
LYMPHOCYTES ABSOLUTE: 0.8 THOU/MM3 (ref 1–4.8)
LYMPHOCYTES NFR BLD AUTO: 22.2 %
MAGNESIUM SERPL-MCNC: 1.7 MG/DL (ref 1.6–2.4)
MCH RBC QN AUTO: 27 PG (ref 26–33)
MCHC RBC AUTO-ENTMCNC: 31.8 GM/DL (ref 32.2–35.5)
MCV RBC AUTO: 84.9 FL (ref 80–94)
MONOCYTES ABSOLUTE: 0.5 THOU/MM3 (ref 0.4–1.3)
MONOCYTES NFR BLD AUTO: 14.1 %
NEUTROPHILS ABSOLUTE: 2.2 THOU/MM3 (ref 1.8–7.7)
NEUTROPHILS NFR BLD AUTO: 60.7 %
NITRITE UR QL STRIP: NEGATIVE
NRBC BLD AUTO-RTO: 0 /100 WBC
NT-PROBNP SERPL IA-MCNC: 339.5 PG/ML (ref 0–124)
OPIATES UR QL SCN: NEGATIVE
OSMOLALITY SERPL CALC.SUM OF ELEC: 271.6 MOSMOL/KG (ref 275–300)
OXYCODONE: NEGATIVE
PCP UR QL SCN: NEGATIVE
PH UR STRIP.AUTO: 6 [PH] (ref 5–9)
PLATELET # BLD AUTO: 107 THOU/MM3 (ref 130–400)
PMV BLD AUTO: 10.3 FL (ref 9.4–12.4)
POTASSIUM SERPL-SCNC: 4.1 MEQ/L (ref 3.5–5.2)
PROT SERPL-MCNC: 7.2 G/DL (ref 6.1–8)
PROT UR STRIP.AUTO-MCNC: NEGATIVE MG/DL
RBC # BLD AUTO: 4.11 MILL/MM3 (ref 4.7–6.1)
SODIUM SERPL-SCNC: 136 MEQ/L (ref 135–145)
SP GR UR REFRACT.AUTO: 1.01 (ref 1–1.03)
TROPONIN, HIGH SENSITIVITY: 8 NG/L (ref 0–12)
UROBILINOGEN, URINE: 1 EU/DL (ref 0–1)
WBC # BLD AUTO: 3.7 THOU/MM3 (ref 4.8–10.8)
WBC #/AREA URNS HPF: NEGATIVE /[HPF]

## 2024-06-20 PROCEDURE — 36415 COLL VENOUS BLD VENIPUNCTURE: CPT

## 2024-06-20 PROCEDURE — 93005 ELECTROCARDIOGRAM TRACING: CPT

## 2024-06-20 PROCEDURE — 71046 X-RAY EXAM CHEST 2 VIEWS: CPT

## 2024-06-20 PROCEDURE — 74177 CT ABD & PELVIS W/CONTRAST: CPT

## 2024-06-20 PROCEDURE — 83690 ASSAY OF LIPASE: CPT

## 2024-06-20 PROCEDURE — 2580000003 HC RX 258

## 2024-06-20 PROCEDURE — 84484 ASSAY OF TROPONIN QUANT: CPT

## 2024-06-20 PROCEDURE — 82077 ASSAY SPEC XCP UR&BREATH IA: CPT

## 2024-06-20 PROCEDURE — 85610 PROTHROMBIN TIME: CPT

## 2024-06-20 PROCEDURE — 6360000004 HC RX CONTRAST MEDICATION: Performed by: EMERGENCY MEDICINE

## 2024-06-20 PROCEDURE — 99285 EMERGENCY DEPT VISIT HI MDM: CPT

## 2024-06-20 PROCEDURE — 85025 COMPLETE CBC W/AUTO DIFF WBC: CPT

## 2024-06-20 PROCEDURE — 81003 URINALYSIS AUTO W/O SCOPE: CPT

## 2024-06-20 PROCEDURE — 83880 ASSAY OF NATRIURETIC PEPTIDE: CPT

## 2024-06-20 PROCEDURE — 80053 COMPREHEN METABOLIC PANEL: CPT

## 2024-06-20 PROCEDURE — 83735 ASSAY OF MAGNESIUM: CPT

## 2024-06-20 PROCEDURE — 82248 BILIRUBIN DIRECT: CPT

## 2024-06-20 PROCEDURE — 80307 DRUG TEST PRSMV CHEM ANLYZR: CPT

## 2024-06-20 RX ORDER — 0.9 % SODIUM CHLORIDE 0.9 %
1000 INTRAVENOUS SOLUTION INTRAVENOUS ONCE
Status: COMPLETED | OUTPATIENT
Start: 2024-06-20 | End: 2024-06-21

## 2024-06-20 RX ADMIN — IOPAMIDOL 80 ML: 755 INJECTION, SOLUTION INTRAVENOUS at 22:56

## 2024-06-20 RX ADMIN — SODIUM CHLORIDE 1000 ML: 9 INJECTION, SOLUTION INTRAVENOUS at 21:56

## 2024-06-20 ASSESSMENT — PAIN DESCRIPTION - LOCATION: LOCATION: ABDOMEN

## 2024-06-20 ASSESSMENT — PAIN - FUNCTIONAL ASSESSMENT
PAIN_FUNCTIONAL_ASSESSMENT: 0-10

## 2024-06-20 ASSESSMENT — PAIN SCALES - GENERAL
PAINLEVEL_OUTOF10: 6
PAINLEVEL_OUTOF10: 6

## 2024-06-21 LAB
EKG ATRIAL RATE: 80 BPM
EKG P AXIS: 63 DEGREES
EKG P-R INTERVAL: 160 MS
EKG Q-T INTERVAL: 400 MS
EKG QRS DURATION: 98 MS
EKG QTC CALCULATION (BAZETT): 461 MS
EKG R AXIS: 44 DEGREES
EKG T AXIS: 63 DEGREES
EKG VENTRICULAR RATE: 80 BPM

## 2024-06-21 PROCEDURE — 93010 ELECTROCARDIOGRAM REPORT: CPT | Performed by: INTERNAL MEDICINE

## 2024-06-21 NOTE — DISCHARGE INSTRUCTIONS
Return to the emergency department immediately for any change or worsening symptoms including but not limited to chest pain, shortness of breath, dizziness, nausea or vomiting.  Please follow-up with gastroenterology and your primary care physician.

## 2024-06-21 NOTE — ED NOTES
Patient resting in bed. Respirations easy and unlabored. No distress noted. Call light within reach. Updated on plan of care.

## 2024-06-21 NOTE — ED PROVIDER NOTES
[facility] EMERGENCY DEPT  EMERGENCY DEPARTMENT ENCOUNTER          Pt Name: Vasyl Patel  MRN: [id number]  Birthdate 1955  Date of evaluation: 6/20/2024  Physician: Olayinka Guidry MD  Supervising Attending Physician: Padmini att. providers found       CHIEF COMPLAINT       Chief Complaint   Patient presents with    Abdominal Pain         HISTORY OF PRESENT ILLNESS    HPI  Vasyl Patel is a 69 y.o. male who presents to the emergency department from home, as a walk in to the ED lobby for evaluation of abdominal pain.  Patient reports that for the past 3 weeks she has had persistent right-sided abdominal pain.  He describes the pain as a deep crampy sensation at a 5/10.  He denies any change in bowel habits.  Denies any pain or burning with urination.  He reports that the pain is constant in nature.  He states that whenever he feels that his belly the right side is stiffer than the left.  He reports that he is a chronic drinker.  He reports that he is drank 324 ounce beers daily for years.  He reports that he only had one 24 ounce beer today.  Denies any fever or chills.  He does report a chronic productive cough of clear sputum.  He reports that he also had chest pain and shortness of breath earlier today.  He states that whenever he was tying his shoes he began to have the sharp chest pain in the center of her chest nonradiating.  He reports he had associated shortness of breath as well.  He reports that the pain went away on its own.  He states that he does get short of breath with exertion.  He reports that this has been his normal.  He reports that he does smoke cigarettes.  Denies any drug use.  Denies any history of withdrawing from alcohol.  The patient has no other acute complaints at this time.          PAST MEDICAL AND SURGICAL HISTORY     Past Medical History:   Diagnosis Date    Alcoholism ([facility])     In AA    Arthritis 05/31/2015    Degenerative disc disease     lower back

## 2024-06-21 NOTE — ED TRIAGE NOTES
Pt present to ed from home with c/c of abdominal pain. Pt states he has had pain for about 3 weeks, denies taking anything for pain. Pt states he has a hard spot on the right side of his abdomen. Upon palpitation pt does have a firm spot on the right side. Rr easy and unlabored. No distress noted.

## 2024-06-22 ENCOUNTER — HOSPITAL ENCOUNTER (EMERGENCY)
Age: 69
Discharge: HOME | End: 2024-06-22
Attending: EMERGENCY MEDICINE
Payer: MEDICARE

## 2024-06-22 VITALS
BODY MASS INDEX: 29.12 KG/M2 | RESPIRATION RATE: 18 BRPM | TEMPERATURE: 98.4 F | DIASTOLIC BLOOD PRESSURE: 79 MMHG | OXYGEN SATURATION: 98 % | WEIGHT: 208 LBS | HEIGHT: 71 IN | HEART RATE: 95 BPM | SYSTOLIC BLOOD PRESSURE: 176 MMHG

## 2024-06-22 DIAGNOSIS — R04.0 EPISTAXIS: Primary | ICD-10-CM

## 2024-06-22 PROCEDURE — 99282 EMERGENCY DEPT VISIT SF MDM: CPT

## 2024-06-22 PROCEDURE — 6370000000 HC RX 637 (ALT 250 FOR IP): Performed by: EMERGENCY MEDICINE

## 2024-06-22 RX ORDER — OXYMETAZOLINE HYDROCHLORIDE 0.05 G/100ML
2 SPRAY NASAL ONCE
Status: COMPLETED | OUTPATIENT
Start: 2024-06-22 | End: 2024-06-22

## 2024-06-22 RX ADMIN — OXYMETAZOLINE HCL 2 SPRAY: 0.05 SPRAY NASAL at 19:15

## 2024-06-22 NOTE — ED TRIAGE NOTES
PT to the ED with epistaxis starting 2 days ago. PT states he does not take any blood thinners. PT states he has some abdominal issues going on that he was diagnosed with the day the nose bleed started and he is concerned they are related.

## 2024-06-22 NOTE — DISCHARGE INSTRUCTIONS
You were seen at [facility] emergency department for nosebleed.  Your nosebleed had already stopped in the ER, and you were given Afrin spray to use if it returns.  You should spray 2 times into the nostril that is bleeding if it starts again.  Please return to the ER if your nosebleed starts and does not stop again.  Be sure to follow-up with your primary doctor in the next 2 weeks, they may want you to see an ENT specialist.
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
## 2024-07-27 ENCOUNTER — HOSPITAL ENCOUNTER (INPATIENT)
Age: 69
LOS: 1 days | Discharge: HOME | DRG: 919 | End: 2024-07-27
Attending: EMERGENCY MEDICINE | Admitting: INTERNAL MEDICINE
Payer: MEDICARE

## 2024-07-27 ENCOUNTER — APPOINTMENT (OUTPATIENT)
Dept: CT IMAGING | Age: 69
DRG: 919 | End: 2024-07-27
Payer: MEDICARE

## 2024-07-27 VITALS
BODY MASS INDEX: 29.12 KG/M2 | HEIGHT: 71 IN | RESPIRATION RATE: 18 BRPM | DIASTOLIC BLOOD PRESSURE: 58 MMHG | TEMPERATURE: 97.8 F | HEART RATE: 101 BPM | WEIGHT: 208 LBS | SYSTOLIC BLOOD PRESSURE: 119 MMHG | OXYGEN SATURATION: 98 %

## 2024-07-27 DIAGNOSIS — K92.2 LOWER GI BLEEDING: ICD-10-CM

## 2024-07-27 DIAGNOSIS — R01.1 HEART MURMUR: ICD-10-CM

## 2024-07-27 DIAGNOSIS — R16.0 LIVER MASSES: ICD-10-CM

## 2024-07-27 DIAGNOSIS — K92.1 HEMATOCHEZIA: Primary | ICD-10-CM

## 2024-07-27 DIAGNOSIS — K70.31 ALCOHOLIC CIRRHOSIS OF LIVER WITH ASCITES (HCC): ICD-10-CM

## 2024-07-27 DIAGNOSIS — D64.9 ACUTE ANEMIA: ICD-10-CM

## 2024-07-27 LAB
ABO: NORMAL
ALBUMIN SERPL BCG-MCNC: 2.9 G/DL (ref 3.5–5.1)
ALP SERPL-CCNC: 231 U/L (ref 38–126)
ALT SERPL W/O P-5'-P-CCNC: 35 U/L (ref 11–66)
AMMONIA PLAS-MCNC: 52 UMOL/L (ref 11–60)
ANION GAP SERPL CALC-SCNC: 10 MEQ/L (ref 8–16)
ANTIBODY SCREEN: NORMAL
APTT PPP: 34.7 SECONDS (ref 22–38)
AST SERPL-CCNC: 110 U/L (ref 5–40)
BASOPHILS ABSOLUTE: 0 THOU/MM3 (ref 0–0.1)
BASOPHILS NFR BLD AUTO: 0.4 %
BILIRUB CONJ SERPL-MCNC: 0.6 MG/DL (ref 0.1–13.8)
BILIRUB SERPL-MCNC: 1.1 MG/DL (ref 0.3–1.2)
BUN SERPL-MCNC: 23 MG/DL (ref 7–22)
CALCIUM SERPL-MCNC: 8.3 MG/DL (ref 8.5–10.5)
CEA SERPL-MCNC: 0.2 NG/ML (ref 0–5)
CHLORIDE SERPL-SCNC: 110 MEQ/L (ref 98–111)
CO2 SERPL-SCNC: 19 MEQ/L (ref 23–33)
CREAT SERPL-MCNC: 1.2 MG/DL (ref 0.4–1.2)
DEPRECATED RDW RBC AUTO: 63.3 FL (ref 35–45)
EKG ATRIAL RATE: 93 BPM
EKG P AXIS: 32 DEGREES
EKG P-R INTERVAL: 152 MS
EKG Q-T INTERVAL: 384 MS
EKG QRS DURATION: 88 MS
EKG QTC CALCULATION (BAZETT): 477 MS
EKG R AXIS: 38 DEGREES
EKG T AXIS: 51 DEGREES
EKG VENTRICULAR RATE: 93 BPM
EOSINOPHIL NFR BLD AUTO: 2.4 %
EOSINOPHILS ABSOLUTE: 0.2 THOU/MM3 (ref 0–0.4)
ERYTHROCYTE [DISTWIDTH] IN BLOOD BY AUTOMATED COUNT: 20.1 % (ref 11.5–14.5)
FERRITIN SERPL IA-MCNC: 54 NG/ML (ref 22–322)
GFR SERPL CREATININE-BSD FRML MDRD: 65 ML/MIN/1.73M2
GGT SERPL-CCNC: 172 U/L (ref 8–69)
GLUCOSE SERPL-MCNC: 109 MG/DL (ref 70–108)
HAV IGM SER QL: NEGATIVE
HBV CORE IGM SERPL QL IA: NEGATIVE
HBV SURFACE AG SERPL QL IA: NEGATIVE
HCT VFR BLD AUTO: 22.5 % (ref 42–52)
HCT VFR BLD AUTO: 26.3 % (ref 42–52)
HCV IGG SERPL QL IA: ABNORMAL
HGB BLD-MCNC: 6.9 GM/DL (ref 14–18)
HGB BLD-MCNC: 8.2 GM/DL (ref 14–18)
IMM GRANULOCYTES # BLD AUTO: 0.03 THOU/MM3 (ref 0–0.07)
IMM GRANULOCYTES NFR BLD AUTO: 0.4 %
INR PPP: 1.42 (ref 0.85–1.13)
IRON SATN MFR SERPL: 15 % (ref 20–50)
IRON SERPL-MCNC: 35 UG/DL (ref 65–195)
LIPASE SERPL-CCNC: 45.5 U/L (ref 5.6–51.3)
LYMPHOCYTES ABSOLUTE: 1.4 THOU/MM3 (ref 1–4.8)
LYMPHOCYTES NFR BLD AUTO: 19.7 %
MCH RBC QN AUTO: 27.4 PG (ref 26–33)
MCHC RBC AUTO-ENTMCNC: 31.2 GM/DL (ref 32.2–35.5)
MCV RBC AUTO: 88 FL (ref 80–94)
MONOCYTES ABSOLUTE: 0.8 THOU/MM3 (ref 0.4–1.3)
MONOCYTES NFR BLD AUTO: 10.9 %
NEUTROPHILS ABSOLUTE: 4.7 THOU/MM3 (ref 1.8–7.7)
NEUTROPHILS NFR BLD AUTO: 66.2 %
NRBC BLD AUTO-RTO: 0 /100 WBC
OSMOLALITY SERPL CALC.SUM OF ELEC: 281.8 MOSMOL/KG (ref 275–300)
PLATELET # BLD AUTO: 204 THOU/MM3 (ref 130–400)
PMV BLD AUTO: 11.4 FL (ref 9.4–12.4)
POTASSIUM SERPL-SCNC: 4.7 MEQ/L (ref 3.5–5.2)
PROT SERPL-MCNC: 5.8 G/DL (ref 6.1–8)
RBC # BLD AUTO: 2.99 MILL/MM3 (ref 4.7–6.1)
RH FACTOR: NORMAL
SODIUM SERPL-SCNC: 139 MEQ/L (ref 135–145)
TIBC SERPL-MCNC: 241 UG/DL (ref 171–450)
TROPONIN, HIGH SENSITIVITY: 11 NG/L (ref 0–12)
WBC # BLD AUTO: 7.1 THOU/MM3 (ref 4.8–10.8)

## 2024-07-27 PROCEDURE — 82378 CARCINOEMBRYONIC ANTIGEN: CPT

## 2024-07-27 PROCEDURE — 2580000003 HC RX 258: Performed by: EMERGENCY MEDICINE

## 2024-07-27 PROCEDURE — 6360000002 HC RX W HCPCS

## 2024-07-27 PROCEDURE — 83690 ASSAY OF LIPASE: CPT

## 2024-07-27 PROCEDURE — P9017 PLASMA 1 DONOR FRZ W/IN 8 HR: HCPCS

## 2024-07-27 PROCEDURE — 6360000002 HC RX W HCPCS: Performed by: EMERGENCY MEDICINE

## 2024-07-27 PROCEDURE — 85025 COMPLETE CBC W/AUTO DIFF WBC: CPT

## 2024-07-27 PROCEDURE — 80074 ACUTE HEPATITIS PANEL: CPT

## 2024-07-27 PROCEDURE — 86301 IMMUNOASSAY TUMOR CA 19-9: CPT

## 2024-07-27 PROCEDURE — 82105 ALPHA-FETOPROTEIN SERUM: CPT

## 2024-07-27 PROCEDURE — 74177 CT ABD & PELVIS W/CONTRAST: CPT

## 2024-07-27 PROCEDURE — 96374 THER/PROPH/DIAG INJ IV PUSH: CPT

## 2024-07-27 PROCEDURE — 86901 BLOOD TYPING SEROLOGIC RH(D): CPT

## 2024-07-27 PROCEDURE — 83540 ASSAY OF IRON: CPT

## 2024-07-27 PROCEDURE — 86850 RBC ANTIBODY SCREEN: CPT

## 2024-07-27 PROCEDURE — 85018 HEMOGLOBIN: CPT

## 2024-07-27 PROCEDURE — 99285 EMERGENCY DEPT VISIT HI MDM: CPT

## 2024-07-27 PROCEDURE — 93005 ELECTROCARDIOGRAM TRACING: CPT | Performed by: EMERGENCY MEDICINE

## 2024-07-27 PROCEDURE — 2060000000 HC ICU INTERMEDIATE R&B

## 2024-07-27 PROCEDURE — 99223 1ST HOSP IP/OBS HIGH 75: CPT | Performed by: INTERNAL MEDICINE

## 2024-07-27 PROCEDURE — 2580000003 HC RX 258: Performed by: INTERNAL MEDICINE

## 2024-07-27 PROCEDURE — 6360000004 HC RX CONTRAST MEDICATION: Performed by: EMERGENCY MEDICINE

## 2024-07-27 PROCEDURE — 87522 HEPATITIS C REVRS TRNSCRPJ: CPT

## 2024-07-27 PROCEDURE — 30233N1 TRANSFUSION OF NONAUTOLOGOUS RED BLOOD CELLS INTO PERIPHERAL VEIN, PERCUTANEOUS APPROACH: ICD-10-PCS

## 2024-07-27 PROCEDURE — 82140 ASSAY OF AMMONIA: CPT

## 2024-07-27 PROCEDURE — 2580000003 HC RX 258

## 2024-07-27 PROCEDURE — 83550 IRON BINDING TEST: CPT

## 2024-07-27 PROCEDURE — 36430 TRANSFUSION BLD/BLD COMPNT: CPT

## 2024-07-27 PROCEDURE — 36415 COLL VENOUS BLD VENIPUNCTURE: CPT

## 2024-07-27 PROCEDURE — 93010 ELECTROCARDIOGRAM REPORT: CPT | Performed by: INTERNAL MEDICINE

## 2024-07-27 PROCEDURE — 85730 THROMBOPLASTIN TIME PARTIAL: CPT

## 2024-07-27 PROCEDURE — 84484 ASSAY OF TROPONIN QUANT: CPT

## 2024-07-27 PROCEDURE — 82977 ASSAY OF GGT: CPT

## 2024-07-27 PROCEDURE — 85014 HEMATOCRIT: CPT

## 2024-07-27 PROCEDURE — 6370000000 HC RX 637 (ALT 250 FOR IP)

## 2024-07-27 PROCEDURE — 86900 BLOOD TYPING SEROLOGIC ABO: CPT

## 2024-07-27 PROCEDURE — 6360000002 HC RX W HCPCS: Performed by: INTERNAL MEDICINE

## 2024-07-27 PROCEDURE — 82248 BILIRUBIN DIRECT: CPT

## 2024-07-27 PROCEDURE — 80053 COMPREHEN METABOLIC PANEL: CPT

## 2024-07-27 PROCEDURE — 82728 ASSAY OF FERRITIN: CPT

## 2024-07-27 PROCEDURE — 85610 PROTHROMBIN TIME: CPT

## 2024-07-27 RX ORDER — ISOSORBIDE MONONITRATE 30 MG/1
30 TABLET, EXTENDED RELEASE ORAL DAILY
COMMUNITY
Start: 2024-07-01

## 2024-07-27 RX ORDER — ACETAMINOPHEN 650 MG/1
650 SUPPOSITORY RECTAL EVERY 6 HOURS PRN
Status: DISCONTINUED | OUTPATIENT
Start: 2024-07-27 | End: 2024-07-27 | Stop reason: HOSPADM

## 2024-07-27 RX ORDER — SODIUM CHLORIDE 9 MG/ML
INJECTION, SOLUTION INTRAVENOUS PRN
Status: DISCONTINUED | OUTPATIENT
Start: 2024-07-27 | End: 2024-07-27 | Stop reason: HOSPADM

## 2024-07-27 RX ORDER — ONDANSETRON 4 MG/1
4 TABLET, ORALLY DISINTEGRATING ORAL EVERY 8 HOURS PRN
Status: DISCONTINUED | OUTPATIENT
Start: 2024-07-27 | End: 2024-07-27 | Stop reason: HOSPADM

## 2024-07-27 RX ORDER — SODIUM CHLORIDE 0.9 % (FLUSH) 0.9 %
5-40 SYRINGE (ML) INJECTION PRN
Status: DISCONTINUED | OUTPATIENT
Start: 2024-07-27 | End: 2024-07-27 | Stop reason: HOSPADM

## 2024-07-27 RX ORDER — ACETAMINOPHEN 325 MG/1
650 TABLET ORAL EVERY 6 HOURS PRN
Status: DISCONTINUED | OUTPATIENT
Start: 2024-07-27 | End: 2024-07-27 | Stop reason: HOSPADM

## 2024-07-27 RX ORDER — ISOSORBIDE MONONITRATE 30 MG/1
30 TABLET, EXTENDED RELEASE ORAL DAILY
Status: DISCONTINUED | OUTPATIENT
Start: 2024-07-27 | End: 2024-07-27 | Stop reason: HOSPADM

## 2024-07-27 RX ORDER — SODIUM CHLORIDE, SODIUM LACTATE, POTASSIUM CHLORIDE, CALCIUM CHLORIDE 600; 310; 30; 20 MG/100ML; MG/100ML; MG/100ML; MG/100ML
INJECTION, SOLUTION INTRAVENOUS CONTINUOUS
Status: DISCONTINUED | OUTPATIENT
Start: 2024-07-27 | End: 2024-07-27 | Stop reason: HOSPADM

## 2024-07-27 RX ORDER — ROPINIROLE 1 MG/1
4 TABLET, FILM COATED ORAL NIGHTLY
Status: DISCONTINUED | OUTPATIENT
Start: 2024-07-27 | End: 2024-07-27 | Stop reason: HOSPADM

## 2024-07-27 RX ORDER — ONDANSETRON 2 MG/ML
4 INJECTION INTRAMUSCULAR; INTRAVENOUS EVERY 6 HOURS PRN
Status: DISCONTINUED | OUTPATIENT
Start: 2024-07-27 | End: 2024-07-27 | Stop reason: HOSPADM

## 2024-07-27 RX ORDER — NICOTINE 21 MG/24HR
1 PATCH, TRANSDERMAL 24 HOURS TRANSDERMAL EVERY 24 HOURS
Status: DISCONTINUED | OUTPATIENT
Start: 2024-07-27 | End: 2024-07-27 | Stop reason: HOSPADM

## 2024-07-27 RX ORDER — ROPINIROLE 4 MG/1
4 TABLET, FILM COATED ORAL NIGHTLY
COMMUNITY
Start: 2023-09-12

## 2024-07-27 RX ORDER — SODIUM CHLORIDE 0.9 % (FLUSH) 0.9 %
5-40 SYRINGE (ML) INJECTION EVERY 12 HOURS SCHEDULED
Status: DISCONTINUED | OUTPATIENT
Start: 2024-07-27 | End: 2024-07-27 | Stop reason: HOSPADM

## 2024-07-27 RX ADMIN — OCTREOTIDE ACETATE 25 MCG/HR: 500 INJECTION, SOLUTION INTRAVENOUS; SUBCUTANEOUS at 13:54

## 2024-07-27 RX ADMIN — PANTOPRAZOLE SODIUM 80 MG: 40 INJECTION, POWDER, FOR SOLUTION INTRAVENOUS at 11:49

## 2024-07-27 RX ADMIN — ROPINIROLE HYDROCHLORIDE 4 MG: 1 TABLET, FILM COATED ORAL at 20:06

## 2024-07-27 RX ADMIN — PANTOPRAZOLE SODIUM 8 MG/HR: 40 INJECTION, POWDER, FOR SOLUTION INTRAVENOUS at 13:05

## 2024-07-27 RX ADMIN — PHYTONADIONE 10 MG: 10 INJECTION, EMULSION INTRAMUSCULAR; INTRAVENOUS; SUBCUTANEOUS at 16:25

## 2024-07-27 RX ADMIN — SODIUM CHLORIDE, POTASSIUM CHLORIDE, SODIUM LACTATE AND CALCIUM CHLORIDE: 600; 310; 30; 20 INJECTION, SOLUTION INTRAVENOUS at 14:50

## 2024-07-27 RX ADMIN — SODIUM CHLORIDE, PRESERVATIVE FREE 10 ML: 5 INJECTION INTRAVENOUS at 20:12

## 2024-07-27 RX ADMIN — IOPAMIDOL 80 ML: 755 INJECTION, SOLUTION INTRAVENOUS at 10:06

## 2024-07-27 ASSESSMENT — PAIN - FUNCTIONAL ASSESSMENT: PAIN_FUNCTIONAL_ASSESSMENT: NONE - DENIES PAIN

## 2024-07-27 NOTE — CONSULTS
[place] [id number]                              CONSULTATION      PATIENT NAME: KRISTY IYER               : 1955  MED REC NO: [id number]                       ROOM: [facility]  ACCOUNT NO: [id number]                       ADMIT DATE: 2024  PROVIDER: Jeremiah Rojas MD      CONSULT DATE: 2024    REASON FOR CONSULT:  For further evaluation of GI bleeding.    HISTORY OF PRESENT ILLNESS:  The patient is a 69-year-old pleasant male, who presented to the emergency room with complaints of rectal bleeding and dizziness.  The patient had history of alcoholic cirrhosis.  The patient has been drinking heavily, most recently drinking 2-3 tallboys on a daily basis; he quit drinking 3 months ago; who had EGD by me on 2024.  Esophagogastroduodenoscopy to 2nd portion of duodenum showed portal hypertensive gastropathy changes and grade 2 esophageal varices noted.  No fundic varices noted.  Subsequently, the patient had a colonoscopy by me on 2024, colonoscopy to distal ileum, 4 flat polyps removed by cold snare polypectomy.  In the recovery room, the patient had small amounts of blood clots noted.  Because of liver cirrhosis, I advised him to be bedrest, fluids.  The patient had 4 episodes of bright red blood yesterday, and last night and this morning he had 2 episodes of melenic stools, lightheaded, dizzy.  Denied any abdominal pain.  Because of worsening of symptoms, he presented to the emergency room.  Workup in the emergency room showed hepatitis B surface antigen negative, hepatitis C antibody positive.  Sodium 137, potassium 4.7, chloride 109, CO2 of 19, blood glucose 109, BUN 23, creatinine 1.2.  Albumin 2.9.  Total bilirubin 1.1, alkaline phosphatase 231, , ALT 35, lipase 45.5.  INR 1.42.  WBC 7.1, hemoglobin 8.2, hematocrit 26.3, MCV 88, platelet count 204,000.  CT scan of the abdomen and pelvis was done, which

## 2024-07-27 NOTE — ED NOTES
ED to inpatient nurses report      Chief Complaint:  Chief Complaint   Patient presents with    Rectal Bleeding    Dizziness     Present to ED from: home    MOA:     LOC: alert and orientated to name, place, date  Mobility: assist x1  Oxygen Baseline: room air    Current needs required: room air       Code Status:   Prior        Mental Status:  Level of Consciousness: Alert (0)    Psych Assessment:        Vitals:  Patient Vitals for the past 24 hrs:   BP Temp Temp src Pulse Resp SpO2 Height Weight   07/27/24 1240 (!) 102/58 -- -- 93 16 99 % -- --   07/27/24 1210 121/71 -- -- 96 19 99 % -- --   07/27/24 1110 104/75 -- -- 98 17 98 % -- --   07/27/24 0928 (!) 104/57 -- -- 92 18 96 % -- --   07/27/24 0910 -- -- -- -- -- 97 % -- --   07/27/24 0858 (!) 103/57 -- -- 91 18 -- -- --   07/27/24 0821 (!) 113/57 97.3 °F (36.3 °C) Oral 94 18 99 % 1.803 m (5' 11\") 94.3 kg (208 lb)        LDAs:   Peripheral IV 07/27/24 Right Forearm (Active)   Site Assessment Clean, dry & intact 07/27/24 0833   Phlebitis Assessment No symptoms 07/27/24 0833   Infiltration Assessment 0 07/27/24 0833       Ambulatory Status:  No data recorded    Diagnosis:  DISPOSITION Admitted 07/27/2024 12:22:03 PM   Final diagnoses:   Hematochezia        Consults:  IP CONSULT TO GI     Pain Score:  Pain Assessment  Pain Assessment: None - Denies Pain    C-SSRS:   Risk of Suicide: No Risk    Sepsis Screening:       [place] Fall Risk:       Swallow Screening        Preferred Language:   English      ALLERGIES     Acetaminophen and Ibuprofen    SURGICAL HISTORY       Past Surgical History:   Procedure Laterality Date    ABDOMINAL ADHESION SURGERY  6/21/12    Dr. Macias     ABDOMINAL EXPLORATION SURGERY  6/21/2012    EXP LAP, Dr. Macias    COLECTOMY  2003    Sigmoid colon resection for diverticular disease.    COLON SURGERY  6/21/12    release of small bowel obstruction     COLONOSCOPY  10/12    Dr. Macias, + adenoma. Recommend Repeat OCT 2015

## 2024-07-27 NOTE — ED TRIAGE NOTES
Patient to ER due to rectal bleeding and dizziness. Patient states he had colonoscopy on Tuesday and since has had heavy rectal bleeding. Patient states he was told he had polyps removed. Today patient states he is bleeding a lot and thinks it is effecting his able to walk. He says he can't walk ten feet because he gets short of breath and dizzy.
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
## 2024-07-27 NOTE — CONSENT
Informed Consent for Blood Component Transfusion Note    I have discussed with the patient the rationale for blood component transfusion; its benefits in treating or preventing fatigue, organ damage, or death; and its risk which includes mild transfusion reactions, rare risk of blood borne infection, or more serious but rare reactions. I have discussed the alternatives to transfusion, including the risk and consequences of not receiving transfusion. The patient had an opportunity to ask questions and had agreed to proceed with transfusion of blood components.    Electronically signed by Jeremiah Rojas MD on 7/27/24 at 3:34 PM EDT

## 2024-07-28 LAB — CANCER AG19-9 SERPL-ACNC: 164 U/ML (ref 0–35)

## 2024-07-28 NOTE — PROGRESS NOTES
Patient wishes to sign out AMA. Care provider notified. Patient was advised of serious complications that could result in his decision. Patient accepted the potential consequences of his decision. Form signed. All lines and monitoring devices removed. Patient given his personal belongings and waiting on a friend to drive him home.

## 2024-07-29 LAB — AFP SERPL-MCNC: ABNORMAL UG/L

## 2024-07-30 ENCOUNTER — APPOINTMENT (OUTPATIENT)
Dept: CT IMAGING | Age: 69
End: 2024-07-30
Payer: MEDICARE

## 2024-07-30 ENCOUNTER — HOSPITAL ENCOUNTER (INPATIENT)
Age: 69
LOS: 1 days | Discharge: HOME | End: 2024-07-31
Attending: EMERGENCY MEDICINE
Payer: MEDICARE

## 2024-07-30 ENCOUNTER — APPOINTMENT (OUTPATIENT)
Dept: GENERAL RADIOLOGY | Age: 69
End: 2024-07-30
Payer: MEDICARE

## 2024-07-30 DIAGNOSIS — D64.9 ACUTE ANEMIA: ICD-10-CM

## 2024-07-30 DIAGNOSIS — K92.2 LOWER GI BLEEDING: Primary | ICD-10-CM

## 2024-07-30 DIAGNOSIS — R01.1 HEART MURMUR: ICD-10-CM

## 2024-07-30 LAB
ABO: NORMAL
ALBUMIN SERPL BCG-MCNC: 2.9 G/DL (ref 3.5–5.1)
ALP SERPL-CCNC: 222 U/L (ref 38–126)
ALT SERPL W/O P-5'-P-CCNC: 164 U/L (ref 11–66)
ANION GAP SERPL CALC-SCNC: 13 MEQ/L (ref 8–16)
ANTIBODY SCREEN: NORMAL
AST SERPL-CCNC: 231 U/L (ref 5–40)
BILIRUB SERPL-MCNC: 0.9 MG/DL (ref 0.3–1.2)
BILIRUB UR QL STRIP: NEGATIVE
BUN SERPL-MCNC: 26 MG/DL (ref 7–22)
CALCIUM SERPL-MCNC: 8.3 MG/DL (ref 8.5–10.5)
CHARACTER UR: CLEAR
CHLORIDE SERPL-SCNC: 107 MEQ/L (ref 98–111)
CO2 SERPL-SCNC: 18 MEQ/L (ref 23–33)
COLOR UR: YELLOW
CREAT SERPL-MCNC: 1.3 MG/DL (ref 0.4–1.2)
EKG ATRIAL RATE: 87 BPM
EKG P AXIS: 40 DEGREES
EKG P-R INTERVAL: 158 MS
EKG Q-T INTERVAL: 370 MS
EKG QRS DURATION: 94 MS
EKG QTC CALCULATION (BAZETT): 445 MS
EKG R AXIS: 68 DEGREES
EKG T AXIS: 29 DEGREES
EKG VENTRICULAR RATE: 87 BPM
GFR SERPL CREATININE-BSD FRML MDRD: 59 ML/MIN/1.73M2
GLUCOSE SERPL-MCNC: 156 MG/DL (ref 70–108)
GLUCOSE UR QL STRIP.AUTO: NEGATIVE MG/DL
HEMOCCULT STL QL: NEGATIVE
HGB UR QL STRIP.AUTO: NEGATIVE
INR PPP: 1.43 (ref 0.85–1.13)
KETONES UR QL STRIP.AUTO: NEGATIVE
LEUKOCYTE ESTERASE UR QL STRIP.AUTO: NEGATIVE
LIPASE SERPL-CCNC: 77.3 U/L (ref 5.6–51.3)
MANUAL DIFF BLD: NORMAL
NITRITE UR QL STRIP.AUTO: NEGATIVE
OSMOLALITY SERPL CALC.SUM OF ELEC: 283.6 MOSMOL/KG (ref 275–300)
PH UR STRIP.AUTO: 5.5 [PH] (ref 5–9)
POTASSIUM SERPL-SCNC: 4.8 MEQ/L (ref 3.5–5.2)
PROT SERPL-MCNC: 5.8 G/DL (ref 6.1–8)
PROT UR STRIP.AUTO-MCNC: NEGATIVE MG/DL
RH FACTOR: NORMAL
SCAN OF BLOOD SMEAR: NORMAL
SODIUM SERPL-SCNC: 138 MEQ/L (ref 135–145)
SP GR UR REFRACT.AUTO: > 1.03 (ref 1–1.03)
TROPONIN, HIGH SENSITIVITY: 26 NG/L (ref 0–12)
TROPONIN, HIGH SENSITIVITY: 28 NG/L (ref 0–12)
UROBILINOGEN UR QL STRIP.AUTO: 1 EU/DL (ref 0–1)

## 2024-07-30 PROCEDURE — 99285 EMERGENCY DEPT VISIT HI MDM: CPT

## 2024-07-30 PROCEDURE — 6360000002 HC RX W HCPCS: Performed by: STUDENT IN AN ORGANIZED HEALTH CARE EDUCATION/TRAINING PROGRAM

## 2024-07-30 PROCEDURE — 6360000004 HC RX CONTRAST MEDICATION: Performed by: STUDENT IN AN ORGANIZED HEALTH CARE EDUCATION/TRAINING PROGRAM

## 2024-07-30 PROCEDURE — 81003 URINALYSIS AUTO W/O SCOPE: CPT

## 2024-07-30 PROCEDURE — 2580000003 HC RX 258: Performed by: STUDENT IN AN ORGANIZED HEALTH CARE EDUCATION/TRAINING PROGRAM

## 2024-07-30 PROCEDURE — 82272 OCCULT BLD FECES 1-3 TESTS: CPT

## 2024-07-30 PROCEDURE — 85014 HEMATOCRIT: CPT

## 2024-07-30 PROCEDURE — 2140000000 HC CCU INTERMEDIATE R&B

## 2024-07-30 PROCEDURE — 80053 COMPREHEN METABOLIC PANEL: CPT

## 2024-07-30 PROCEDURE — 71045 X-RAY EXAM CHEST 1 VIEW: CPT

## 2024-07-30 PROCEDURE — 87040 BLOOD CULTURE FOR BACTERIA: CPT

## 2024-07-30 PROCEDURE — 6370000000 HC RX 637 (ALT 250 FOR IP): Performed by: STUDENT IN AN ORGANIZED HEALTH CARE EDUCATION/TRAINING PROGRAM

## 2024-07-30 PROCEDURE — 83690 ASSAY OF LIPASE: CPT

## 2024-07-30 PROCEDURE — 84484 ASSAY OF TROPONIN QUANT: CPT

## 2024-07-30 PROCEDURE — P9017 PLASMA 1 DONOR FRZ W/IN 8 HR: HCPCS

## 2024-07-30 PROCEDURE — 6360000002 HC RX W HCPCS

## 2024-07-30 PROCEDURE — 36415 COLL VENOUS BLD VENIPUNCTURE: CPT

## 2024-07-30 PROCEDURE — 86901 BLOOD TYPING SEROLOGIC RH(D): CPT

## 2024-07-30 PROCEDURE — 86850 RBC ANTIBODY SCREEN: CPT

## 2024-07-30 PROCEDURE — 93010 ELECTROCARDIOGRAM REPORT: CPT | Performed by: INTERNAL MEDICINE

## 2024-07-30 PROCEDURE — 93005 ELECTROCARDIOGRAM TRACING: CPT | Performed by: EMERGENCY MEDICINE

## 2024-07-30 PROCEDURE — 86923 COMPATIBILITY TEST ELECTRIC: CPT

## 2024-07-30 PROCEDURE — 30233K1 TRANSFUSION OF NONAUTOLOGOUS FROZEN PLASMA INTO PERIPHERAL VEIN, PERCUTANEOUS APPROACH: ICD-10-PCS | Performed by: INTERNAL MEDICINE

## 2024-07-30 PROCEDURE — 85018 HEMOGLOBIN: CPT

## 2024-07-30 PROCEDURE — 85025 COMPLETE CBC W/AUTO DIFF WBC: CPT

## 2024-07-30 PROCEDURE — 85610 PROTHROMBIN TIME: CPT

## 2024-07-30 PROCEDURE — 74177 CT ABD & PELVIS W/CONTRAST: CPT

## 2024-07-30 PROCEDURE — 6370000000 HC RX 637 (ALT 250 FOR IP)

## 2024-07-30 PROCEDURE — 86900 BLOOD TYPING SEROLOGIC ABO: CPT

## 2024-07-30 PROCEDURE — 30233N1 TRANSFUSION OF NONAUTOLOGOUS RED BLOOD CELLS INTO PERIPHERAL VEIN, PERCUTANEOUS APPROACH: ICD-10-PCS | Performed by: INTERNAL MEDICINE

## 2024-07-30 PROCEDURE — 96375 TX/PRO/DX INJ NEW DRUG ADDON: CPT

## 2024-07-30 PROCEDURE — 96374 THER/PROPH/DIAG INJ IV PUSH: CPT

## 2024-07-30 PROCEDURE — 2580000003 HC RX 258

## 2024-07-30 PROCEDURE — P9016 RBC LEUKOCYTES REDUCED: HCPCS

## 2024-07-30 RX ORDER — 0.9 % SODIUM CHLORIDE 0.9 %
500 INTRAVENOUS SOLUTION INTRAVENOUS ONCE
Status: COMPLETED | OUTPATIENT
Start: 2024-07-30 | End: 2024-07-30

## 2024-07-30 RX ORDER — MAGNESIUM SULFATE IN WATER 40 MG/ML
2000 INJECTION, SOLUTION INTRAVENOUS PRN
Status: DISCONTINUED | OUTPATIENT
Start: 2024-07-30 | End: 2024-07-31 | Stop reason: HOSPADM

## 2024-07-30 RX ORDER — LANOLIN ALCOHOL/MO/W.PET/CERES
100 CREAM (GRAM) TOPICAL DAILY
Status: DISCONTINUED | OUTPATIENT
Start: 2024-07-31 | End: 2024-07-31 | Stop reason: HOSPADM

## 2024-07-30 RX ORDER — NICOTINE 21 MG/24HR
1 PATCH, TRANSDERMAL 24 HOURS TRANSDERMAL EVERY 24 HOURS
Status: DISCONTINUED | OUTPATIENT
Start: 2024-07-30 | End: 2024-07-31 | Stop reason: HOSPADM

## 2024-07-30 RX ORDER — ACETAMINOPHEN 500 MG
1000 TABLET ORAL ONCE
Status: COMPLETED | OUTPATIENT
Start: 2024-07-30 | End: 2024-07-30

## 2024-07-30 RX ORDER — ONDANSETRON 2 MG/ML
4 INJECTION INTRAMUSCULAR; INTRAVENOUS EVERY 6 HOURS PRN
Status: DISCONTINUED | OUTPATIENT
Start: 2024-07-30 | End: 2024-07-31 | Stop reason: HOSPADM

## 2024-07-30 RX ORDER — ISOSORBIDE MONONITRATE 30 MG/1
30 TABLET, EXTENDED RELEASE ORAL DAILY
Status: DISCONTINUED | OUTPATIENT
Start: 2024-07-31 | End: 2024-07-31 | Stop reason: HOSPADM

## 2024-07-30 RX ORDER — POTASSIUM CHLORIDE 20 MEQ/1
40 TABLET, EXTENDED RELEASE ORAL PRN
Status: DISCONTINUED | OUTPATIENT
Start: 2024-07-30 | End: 2024-07-31 | Stop reason: HOSPADM

## 2024-07-30 RX ORDER — ROPINIROLE 1 MG/1
4 TABLET, FILM COATED ORAL NIGHTLY
Status: DISCONTINUED | OUTPATIENT
Start: 2024-07-30 | End: 2024-07-31 | Stop reason: HOSPADM

## 2024-07-30 RX ORDER — FOLIC ACID 1 MG/1
1 TABLET ORAL DAILY
Status: DISCONTINUED | OUTPATIENT
Start: 2024-07-31 | End: 2024-07-31 | Stop reason: HOSPADM

## 2024-07-30 RX ORDER — SODIUM CHLORIDE 9 MG/ML
INJECTION, SOLUTION INTRAVENOUS CONTINUOUS
Status: ACTIVE | OUTPATIENT
Start: 2024-07-30 | End: 2024-07-31

## 2024-07-30 RX ORDER — SODIUM CHLORIDE 0.9 % (FLUSH) 0.9 %
5-40 SYRINGE (ML) INJECTION EVERY 12 HOURS SCHEDULED
Status: DISCONTINUED | OUTPATIENT
Start: 2024-07-30 | End: 2024-07-31 | Stop reason: HOSPADM

## 2024-07-30 RX ORDER — POLYETHYLENE GLYCOL 3350 17 G/17G
17 POWDER, FOR SOLUTION ORAL DAILY PRN
Status: DISCONTINUED | OUTPATIENT
Start: 2024-07-30 | End: 2024-07-31 | Stop reason: HOSPADM

## 2024-07-30 RX ORDER — TRAZODONE HYDROCHLORIDE 50 MG/1
50 TABLET ORAL NIGHTLY PRN
Status: DISCONTINUED | OUTPATIENT
Start: 2024-07-30 | End: 2024-07-31 | Stop reason: HOSPADM

## 2024-07-30 RX ORDER — PANTOPRAZOLE SODIUM 40 MG/10ML
40 INJECTION, POWDER, LYOPHILIZED, FOR SOLUTION INTRAVENOUS DAILY
Status: DISCONTINUED | OUTPATIENT
Start: 2024-07-30 | End: 2024-07-31 | Stop reason: HOSPADM

## 2024-07-30 RX ORDER — DEXTROSE MONOHYDRATE 100 MG/ML
INJECTION, SOLUTION INTRAVENOUS CONTINUOUS PRN
Status: DISCONTINUED | OUTPATIENT
Start: 2024-07-30 | End: 2024-07-31 | Stop reason: HOSPADM

## 2024-07-30 RX ORDER — NALTREXONE HYDROCHLORIDE 50 MG/1
50 TABLET, FILM COATED ORAL DAILY
Status: DISCONTINUED | OUTPATIENT
Start: 2024-07-31 | End: 2024-07-31 | Stop reason: HOSPADM

## 2024-07-30 RX ORDER — ACETAMINOPHEN 325 MG/1
650 TABLET ORAL EVERY 6 HOURS PRN
Status: DISCONTINUED | OUTPATIENT
Start: 2024-07-30 | End: 2024-07-31 | Stop reason: HOSPADM

## 2024-07-30 RX ORDER — KETOROLAC TROMETHAMINE 30 MG/ML
15 INJECTION, SOLUTION INTRAMUSCULAR; INTRAVENOUS ONCE
Status: COMPLETED | OUTPATIENT
Start: 2024-07-30 | End: 2024-07-30

## 2024-07-30 RX ORDER — ACETAMINOPHEN 650 MG/1
650 SUPPOSITORY RECTAL EVERY 6 HOURS PRN
Status: DISCONTINUED | OUTPATIENT
Start: 2024-07-30 | End: 2024-07-31 | Stop reason: HOSPADM

## 2024-07-30 RX ORDER — SODIUM CHLORIDE 0.9 % (FLUSH) 0.9 %
5-40 SYRINGE (ML) INJECTION PRN
Status: DISCONTINUED | OUTPATIENT
Start: 2024-07-30 | End: 2024-07-31 | Stop reason: HOSPADM

## 2024-07-30 RX ORDER — ONDANSETRON 2 MG/ML
4 INJECTION INTRAMUSCULAR; INTRAVENOUS ONCE
Status: COMPLETED | OUTPATIENT
Start: 2024-07-30 | End: 2024-07-30

## 2024-07-30 RX ORDER — GLUCAGON 1 MG/ML
1 KIT INJECTION PRN
Status: DISCONTINUED | OUTPATIENT
Start: 2024-07-30 | End: 2024-07-31 | Stop reason: HOSPADM

## 2024-07-30 RX ORDER — ONDANSETRON 4 MG/1
4 TABLET, ORALLY DISINTEGRATING ORAL EVERY 8 HOURS PRN
Status: DISCONTINUED | OUTPATIENT
Start: 2024-07-30 | End: 2024-07-31 | Stop reason: HOSPADM

## 2024-07-30 RX ORDER — SODIUM CHLORIDE 9 MG/ML
INJECTION, SOLUTION INTRAVENOUS PRN
Status: DISCONTINUED | OUTPATIENT
Start: 2024-07-30 | End: 2024-07-31 | Stop reason: HOSPADM

## 2024-07-30 RX ORDER — MORPHINE SULFATE 2 MG/ML
2 INJECTION, SOLUTION INTRAMUSCULAR; INTRAVENOUS ONCE
Status: COMPLETED | OUTPATIENT
Start: 2024-07-30 | End: 2024-07-30

## 2024-07-30 RX ORDER — POTASSIUM CHLORIDE 7.45 MG/ML
10 INJECTION INTRAVENOUS PRN
Status: DISCONTINUED | OUTPATIENT
Start: 2024-07-30 | End: 2024-07-31 | Stop reason: HOSPADM

## 2024-07-30 RX ADMIN — ACETAMINOPHEN 1000 MG: 500 TABLET ORAL at 19:50

## 2024-07-30 RX ADMIN — MORPHINE SULFATE 2 MG: 2 INJECTION, SOLUTION INTRAMUSCULAR; INTRAVENOUS at 17:19

## 2024-07-30 RX ADMIN — SODIUM CHLORIDE: 9 INJECTION, SOLUTION INTRAVENOUS at 22:00

## 2024-07-30 RX ADMIN — IOPAMIDOL 80 ML: 755 INJECTION, SOLUTION INTRAVENOUS at 16:58

## 2024-07-30 RX ADMIN — PANTOPRAZOLE SODIUM 40 MG: 40 INJECTION, POWDER, FOR SOLUTION INTRAVENOUS at 17:25

## 2024-07-30 RX ADMIN — ONDANSETRON 4 MG: 2 INJECTION INTRAMUSCULAR; INTRAVENOUS at 17:19

## 2024-07-30 RX ADMIN — ROPINIROLE HYDROCHLORIDE 4 MG: 1 TABLET, FILM COATED ORAL at 23:14

## 2024-07-30 RX ADMIN — SODIUM CHLORIDE 500 ML: 9 INJECTION, SOLUTION INTRAVENOUS at 17:31

## 2024-07-30 RX ADMIN — KETOROLAC TROMETHAMINE 15 MG: 30 INJECTION, SOLUTION INTRAMUSCULAR at 21:57

## 2024-07-30 ASSESSMENT — PAIN DESCRIPTION - ORIENTATION: ORIENTATION: OUTER;LOWER

## 2024-07-30 ASSESSMENT — PAIN DESCRIPTION - LOCATION
LOCATION: ABDOMEN;CHEST
LOCATION: ABDOMEN
LOCATION: CHEST;ABDOMEN
LOCATION: ABDOMEN

## 2024-07-30 ASSESSMENT — PAIN DESCRIPTION - DESCRIPTORS
DESCRIPTORS: DULL;PRESSURE
DESCRIPTORS: SHARP;STABBING

## 2024-07-30 ASSESSMENT — PAIN SCALES - GENERAL
PAINLEVEL_OUTOF10: 0
PAINLEVEL_OUTOF10: 8
PAINLEVEL_OUTOF10: 6

## 2024-07-30 ASSESSMENT — PAIN - FUNCTIONAL ASSESSMENT
PAIN_FUNCTIONAL_ASSESSMENT: 0-10

## 2024-07-30 NOTE — ED NOTES
Blood transfusion initiated per order. At 15 minute vital sign check, pt has temp of 100.4. Pt's temp prior to transfusion 99.6. Dr. Castorena notified. Blood bank called; states one and a half degree temp change is considered reaction. Dr. Castorena notified; states okay to continue same blood unit. Pt denies back/chest pain, shortness of breath, itchiness, or other transfusion reaction symptoms.

## 2024-07-30 NOTE — ED NOTES
Pt resting with call light in reach. Pt respirations easy and unlabored. Pt denies further needs.

## 2024-07-30 NOTE — ED TRIAGE NOTES
Pt presents to ED with weakness and abdominal pain that started last week. Pt states he was here a week ago and signed himself out. Pt states he got a colonoscopy on 7/23 and his weakness has been progressively worse. Pt also reports chest pain and dizziness. Pt reports he had an episode of vomiting a few days ago that had blood in it. He also reports his urine and stools have had blood in them. Pt not on blood thinners.

## 2024-07-30 NOTE — DISCHARGE SUMMARY
Patient left AMA the day of admission.   Admitted for melena in setting of cirrhosis and recent endoscopies.   Also has liver lesions for which further imaging was ordered inpatient but not obtained, has these also ordered outpatient by GI.   Please see H&P and GI consult for more details.
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
## 2024-07-31 ENCOUNTER — APPOINTMENT (OUTPATIENT)
Dept: MRI IMAGING | Age: 69
End: 2024-07-31
Payer: MEDICARE

## 2024-07-31 ENCOUNTER — APPOINTMENT (OUTPATIENT)
Age: 69
End: 2024-07-31
Payer: MEDICARE

## 2024-07-31 VITALS
DIASTOLIC BLOOD PRESSURE: 53 MMHG | TEMPERATURE: 98 F | SYSTOLIC BLOOD PRESSURE: 142 MMHG | OXYGEN SATURATION: 96 % | BODY MASS INDEX: 28.55 KG/M2 | HEIGHT: 71 IN | HEART RATE: 72 BPM | WEIGHT: 203.93 LBS | RESPIRATION RATE: 20 BRPM

## 2024-07-31 LAB
ANION GAP SERPL CALC-SCNC: 10 MEQ/L (ref 8–16)
ANISOCYTOSIS BLD QL SMEAR: PRESENT
BASOPHILS ABSOLUTE: 0 THOU/MM3 (ref 0–0.1)
BASOPHILS NFR BLD AUTO: 0 %
BUN SERPL-MCNC: 27 MG/DL (ref 7–22)
CALCIUM SERPL-MCNC: 7.8 MG/DL (ref 8.5–10.5)
CHLORIDE SERPL-SCNC: 109 MEQ/L (ref 98–111)
CO2 SERPL-SCNC: 19 MEQ/L (ref 23–33)
CREAT SERPL-MCNC: 1.5 MG/DL (ref 0.4–1.2)
DEPRECATED RDW RBC AUTO: 58 FL (ref 35–45)
DEPRECATED RDW RBC AUTO: 65.2 FL (ref 35–45)
ECHO AO ASC DIAM: 3 CM
ECHO AO ASCENDING AORTA INDEX: 1.41 CM/M2
ECHO AR MAX VEL PISA: 3.3 M/S
ECHO AV AREA PEAK VELOCITY: 1.8 CM2
ECHO AV AREA VTI: 1.6 CM2
ECHO AV AREA/BSA PEAK VELOCITY: 0.8 CM2/M2
ECHO AV AREA/BSA VTI: 0.8 CM2/M2
ECHO AV CUSP MM: 1.4 CM
ECHO AV MEAN GRADIENT: 16 MMHG
ECHO AV MEAN VELOCITY: 1.9 M/S
ECHO AV PEAK GRADIENT: 27 MMHG
ECHO AV PEAK VELOCITY: 2.6 M/S
ECHO AV REGURGITANT PHT: 406 MS
ECHO AV VELOCITY RATIO: 0.62
ECHO AV VTI: 54.8 CM
ECHO BSA: 2.17 M2
ECHO LA AREA 2C: 29.5 CM2
ECHO LA AREA 4C: 31.3 CM2
ECHO LA DIAMETER INDEX: 2.21 CM/M2
ECHO LA DIAMETER: 4.7 CM
ECHO LA MAJOR AXIS: 7.1 CM
ECHO LA MINOR AXIS: 6.8 CM
ECHO LA VOL BP: 111 ML (ref 18–58)
ECHO LA VOL MOD A2C: 108 ML (ref 18–58)
ECHO LA VOL MOD A4C: 111 ML (ref 18–58)
ECHO LA VOL/BSA BIPLANE: 52 ML/M2 (ref 16–34)
ECHO LA VOLUME INDEX MOD A2C: 51 ML/M2 (ref 16–34)
ECHO LA VOLUME INDEX MOD A4C: 52 ML/M2 (ref 16–34)
ECHO LV E' LATERAL VELOCITY: 7 CM/S
ECHO LV E' SEPTAL VELOCITY: 5 CM/S
ECHO LV EDV A2C: 101 ML
ECHO LV EDV A4C: 111 ML
ECHO LV EDV INDEX A4C: 52 ML/M2
ECHO LV EDV NDEX A2C: 47 ML/M2
ECHO LV EJECTION FRACTION A2C: 63 %
ECHO LV EJECTION FRACTION A4C: 71 %
ECHO LV EJECTION FRACTION BIPLANE: 66 % (ref 55–100)
ECHO LV ESV A2C: 37 ML
ECHO LV ESV A4C: 33 ML
ECHO LV ESV INDEX A2C: 17 ML/M2
ECHO LV ESV INDEX A4C: 15 ML/M2
ECHO LV FRACTIONAL SHORTENING: 29 % (ref 28–44)
ECHO LV INTERNAL DIMENSION DIASTOLE INDEX: 2.25 CM/M2
ECHO LV INTERNAL DIMENSION DIASTOLIC: 4.8 CM (ref 4.2–5.9)
ECHO LV INTERNAL DIMENSION SYSTOLIC INDEX: 1.6 CM/M2
ECHO LV INTERNAL DIMENSION SYSTOLIC: 3.4 CM
ECHO LV ISOVOLUMETRIC RELAXATION TIME (IVRT): 67 MS
ECHO LV IVSD: 1.5 CM (ref 0.6–1)
ECHO LV MASS 2D: 303.4 G (ref 88–224)
ECHO LV MASS INDEX 2D: 142.4 G/M2 (ref 49–115)
ECHO LV POSTERIOR WALL DIASTOLIC: 1.5 CM (ref 0.6–1)
ECHO LV RELATIVE WALL THICKNESS RATIO: 0.63
ECHO LVOT AREA: 2.8 CM2
ECHO LVOT AV VTI INDEX: 0.61
ECHO LVOT DIAM: 1.9 CM
ECHO LVOT MEAN GRADIENT: 6 MMHG
ECHO LVOT PEAK GRADIENT: 10 MMHG
ECHO LVOT PEAK VELOCITY: 1.6 M/S
ECHO LVOT STROKE VOLUME INDEX: 44.7 ML/M2
ECHO LVOT SV: 95.2 ML
ECHO LVOT VTI: 33.6 CM
ECHO MV A VELOCITY: 1.25 M/S
ECHO MV AREA VTI: 1.6 CM2
ECHO MV E DECELERATION TIME (DT): 294 MS
ECHO MV E VELOCITY: 1.52 M/S
ECHO MV E/A RATIO: 1.22
ECHO MV E/E' LATERAL: 21.71
ECHO MV E/E' RATIO (AVERAGED): 26.06
ECHO MV E/E' SEPTAL: 30.4
ECHO MV LVOT VTI INDEX: 1.76
ECHO MV MAX VELOCITY: 1.7 M/S
ECHO MV MEAN GRADIENT: 5 MMHG
ECHO MV MEAN VELOCITY: 1 M/S
ECHO MV PEAK GRADIENT: 11 MMHG
ECHO MV REGURGITANT PEAK GRADIENT: 71 MMHG
ECHO MV REGURGITANT PEAK VELOCITY: 4.2 M/S
ECHO MV VTI: 59 CM
ECHO PV MAX VELOCITY: 0.7 M/S
ECHO PV PEAK GRADIENT: 2 MMHG
ECHO RV INTERNAL DIMENSION: 3.6 CM
ECHO RV TAPSE: 3.1 CM (ref 1.7–?)
ECHO TV E WAVE: 1 M/S
ECHO TV REGURGITANT MAX VELOCITY: 3.01 M/S
ECHO TV REGURGITANT MAX VELOCITY: 3.01 M/S
ECHO TV REGURGITANT PEAK GRADIENT: 36 MMHG
EOSINOPHIL NFR BLD AUTO: 2 %
EOSINOPHILS ABSOLUTE: 0.2 THOU/MM3 (ref 0–0.4)
ERYTHROCYTE [DISTWIDTH] IN BLOOD BY AUTOMATED COUNT: 18.4 % (ref 11.5–14.5)
ERYTHROCYTE [DISTWIDTH] IN BLOOD BY AUTOMATED COUNT: 20.3 % (ref 11.5–14.5)
GFR SERPL CREATININE-BSD FRML MDRD: 50 ML/MIN/1.73M2
GLUCOSE SERPL-MCNC: 90 MG/DL (ref 70–108)
HCT VFR BLD AUTO: 19.4 % (ref 42–52)
HCT VFR BLD AUTO: 19.5 % (ref 42–52)
HCT VFR BLD AUTO: 22.3 % (ref 42–52)
HCT VFR BLD AUTO: 24.7 % (ref 42–52)
HGB BLD-MCNC: 5.9 GM/DL (ref 14–18)
HGB BLD-MCNC: 6.1 GM/DL (ref 14–18)
HGB BLD-MCNC: 7.3 GM/DL (ref 14–18)
HGB BLD-MCNC: 7.7 GM/DL (ref 14–18)
LYMPHOCYTES ABSOLUTE: 1 THOU/MM3 (ref 1–4.8)
LYMPHOCYTES NFR BLD AUTO: 13 %
MAGNESIUM SERPL-MCNC: 2.3 MG/DL (ref 1.6–2.4)
MCH RBC QN AUTO: 27.2 PG (ref 26–33)
MCH RBC QN AUTO: 28.5 PG (ref 26–33)
MCHC RBC AUTO-ENTMCNC: 30.4 GM/DL (ref 32.2–35.5)
MCHC RBC AUTO-ENTMCNC: 32.7 GM/DL (ref 32.2–35.5)
MCV RBC AUTO: 87.1 FL (ref 80–94)
MCV RBC AUTO: 89.4 FL (ref 80–94)
METAMYELOCYTES: 2 %
MONOCYTES ABSOLUTE: 0.6 THOU/MM3 (ref 0.4–1.3)
MONOCYTES NFR BLD AUTO: 8 %
MYELOCYTES: 1 %
NEUTROPHILS ABSOLUTE: 5.8 THOU/MM3 (ref 1.8–7.7)
NEUTROPHILS NFR BLD AUTO: 74 %
NRBC BLD AUTO-RTO: 0 /100 WBC
OSMOLALITY SERPL CALC.SUM OF ELEC: 280.3 MOSMOL/KG (ref 275–300)
PATHOLOGIST REVIEW: ABNORMAL
PLATELET # BLD AUTO: 129 THOU/MM3 (ref 130–400)
PLATELET # BLD AUTO: 176 THOU/MM3 (ref 130–400)
PMV BLD AUTO: 10.9 FL (ref 9.4–12.4)
PMV BLD AUTO: 11 FL (ref 9.4–12.4)
POIKILOCYTES: ABNORMAL
POLYCHROMASIA BLD QL SMEAR: ABNORMAL
POTASSIUM SERPL-SCNC: 4.4 MEQ/L (ref 3.5–5.2)
RBC # BLD AUTO: 2.17 MILL/MM3 (ref 4.7–6.1)
RBC # BLD AUTO: 2.56 MILL/MM3 (ref 4.7–6.1)
SODIUM SERPL-SCNC: 138 MEQ/L (ref 135–145)
TARGETS BLD QL SMEAR: ABNORMAL
TROPONIN, HIGH SENSITIVITY: 31 NG/L (ref 0–12)
VARIANT LYMPHS BLD QL SMEAR: ABNORMAL %
WBC # BLD AUTO: 5.3 THOU/MM3 (ref 4.8–10.8)
WBC # BLD AUTO: 7.9 THOU/MM3 (ref 4.8–10.8)

## 2024-07-31 PROCEDURE — 6360000002 HC RX W HCPCS: Performed by: INTERNAL MEDICINE

## 2024-07-31 PROCEDURE — 99223 1ST HOSP IP/OBS HIGH 75: CPT | Performed by: INTERNAL MEDICINE

## 2024-07-31 PROCEDURE — 36430 TRANSFUSION BLD/BLD COMPNT: CPT

## 2024-07-31 PROCEDURE — 93306 TTE W/DOPPLER COMPLETE: CPT | Performed by: INTERNAL MEDICINE

## 2024-07-31 PROCEDURE — 85018 HEMOGLOBIN: CPT

## 2024-07-31 PROCEDURE — 6360000002 HC RX W HCPCS: Performed by: STUDENT IN AN ORGANIZED HEALTH CARE EDUCATION/TRAINING PROGRAM

## 2024-07-31 PROCEDURE — 83735 ASSAY OF MAGNESIUM: CPT

## 2024-07-31 PROCEDURE — 6370000000 HC RX 637 (ALT 250 FOR IP)

## 2024-07-31 PROCEDURE — 93306 TTE W/DOPPLER COMPLETE: CPT

## 2024-07-31 PROCEDURE — 80048 BASIC METABOLIC PNL TOTAL CA: CPT

## 2024-07-31 PROCEDURE — 85027 COMPLETE CBC AUTOMATED: CPT

## 2024-07-31 PROCEDURE — 36415 COLL VENOUS BLD VENIPUNCTURE: CPT

## 2024-07-31 PROCEDURE — 2580000003 HC RX 258

## 2024-07-31 PROCEDURE — 85014 HEMATOCRIT: CPT

## 2024-07-31 PROCEDURE — 94761 N-INVAS EAR/PLS OXIMETRY MLT: CPT

## 2024-07-31 RX ORDER — SODIUM CHLORIDE 9 MG/ML
INJECTION, SOLUTION INTRAVENOUS PRN
Status: DISCONTINUED | OUTPATIENT
Start: 2024-07-31 | End: 2024-07-31 | Stop reason: HOSPADM

## 2024-07-31 RX ORDER — MORPHINE SULFATE 2 MG/ML
1 INJECTION, SOLUTION INTRAMUSCULAR; INTRAVENOUS EVERY 6 HOURS PRN
Status: DISCONTINUED | OUTPATIENT
Start: 2024-07-31 | End: 2024-07-31 | Stop reason: HOSPADM

## 2024-07-31 RX ADMIN — NALTREXONE HYDROCHLORIDE 50 MG: 50 TABLET, FILM COATED ORAL at 08:37

## 2024-07-31 RX ADMIN — MORPHINE SULFATE 1 MG: 2 INJECTION, SOLUTION INTRAMUSCULAR; INTRAVENOUS at 09:23

## 2024-07-31 RX ADMIN — PANTOPRAZOLE SODIUM 40 MG: 40 INJECTION, POWDER, FOR SOLUTION INTRAVENOUS at 08:37

## 2024-07-31 RX ADMIN — FOLIC ACID 1 MG: 1 TABLET ORAL at 08:37

## 2024-07-31 RX ADMIN — Medication 100 MG: at 08:37

## 2024-07-31 RX ADMIN — ISOSORBIDE MONONITRATE 30 MG: 30 TABLET, EXTENDED RELEASE ORAL at 08:37

## 2024-07-31 RX ADMIN — SODIUM CHLORIDE, PRESERVATIVE FREE 10 ML: 5 INJECTION INTRAVENOUS at 08:37

## 2024-07-31 ASSESSMENT — PAIN DESCRIPTION - ORIENTATION
ORIENTATION: RIGHT
ORIENTATION: RIGHT

## 2024-07-31 ASSESSMENT — PAIN SCALES - GENERAL
PAINLEVEL_OUTOF10: 8
PAINLEVEL_OUTOF10: 7

## 2024-07-31 ASSESSMENT — PAIN - FUNCTIONAL ASSESSMENT
PAIN_FUNCTIONAL_ASSESSMENT: ACTIVITIES ARE NOT PREVENTED
PAIN_FUNCTIONAL_ASSESSMENT: ACTIVITIES ARE NOT PREVENTED

## 2024-07-31 ASSESSMENT — PAIN DESCRIPTION - FREQUENCY: FREQUENCY: INTERMITTENT

## 2024-07-31 ASSESSMENT — PAIN DESCRIPTION - DESCRIPTORS
DESCRIPTORS: ACHING
DESCRIPTORS: ACHING

## 2024-07-31 ASSESSMENT — PAIN DESCRIPTION - ONSET: ONSET: ON-GOING

## 2024-07-31 ASSESSMENT — PAIN DESCRIPTION - PAIN TYPE: TYPE: ACUTE PAIN

## 2024-07-31 ASSESSMENT — PAIN DESCRIPTION - LOCATION
LOCATION: ABDOMEN
LOCATION: ABDOMEN

## 2024-07-31 NOTE — DISCHARGE SUMMARY
Patient left AMA-left AGAINST MEDICAL ADVICE, this spite explaining the risks  Has done this previously-that is leaving AMA    Full H&P by the resident-same day patient was seen prior to leaving AMA    Electronically signed by Sarai Maynard MD on 7/31/2024 at 5:53 PM

## 2024-07-31 NOTE — ED NOTES
Pt resting watching TV with call light in reach. Pt respirations easy and unlabored. Pt denies further needs.

## 2024-07-31 NOTE — PROGRESS NOTES
See H and P from resident- seen pt today    Electronically signed by Sarai Maynard MD on 7/31/2024 at 8:54 AM

## 2024-07-31 NOTE — PROGRESS NOTES
Patient is wanting to leave Against Medical Advice (AMA). Physician notified and explained the risks and alternatives to leaving AMA. Patient continues to want to leave AMA. Patient educated on discharge instructions, medications, outpatient tests/procedures they have, and follow up appointments. No further questions or concerns voiced by patient. Patient discharged and brought home via private vehicle.

## 2024-07-31 NOTE — ED NOTES
ED to inpatient nurses report      Chief Complaint:  Chief Complaint   Patient presents with    Fatigue    Dizziness    Chest Pain    Abdominal Pain     Present to ED from: Home    MOA:     LOC: alert and orientated to name, place, date  Mobility: Requires assistance * 1  Oxygen Baseline: Room air     Current needs required: 0     Code Status:   Prior    What abnormal results were found and what did you give/do to treat them? Lower GI bleed, acute anemia; Blood and plasma administration   Any procedures or intervention occur? N/a    Mental Status:  Level of Consciousness: Alert (0)    Psych Assessment:        Vitals:  Patient Vitals for the past 24 hrs:   BP Temp Temp src Pulse Resp SpO2 Height Weight   07/30/24 1940 125/61 99.4 °F (37.4 °C) -- 84 24 95 % -- --   07/30/24 1920 (!) 118/56 -- -- 85 25 96 % -- --   07/30/24 1853 (!) 123/56 100.4 °F (38 °C) Oral 87 24 95 % -- --   07/30/24 1847 130/62 -- -- 86 24 96 % -- --   07/30/24 1842 133/61 -- -- 88 24 95 % -- --   07/30/24 1837 131/61 99.6 °F (37.6 °C) Oral 91 24 96 % -- --   07/30/24 1744 131/60 -- -- 88 24 95 % -- --   07/30/24 1711 (!) 133/56 -- -- 87 18 97 % -- --   07/30/24 1558 (!) 127/53 -- -- 86 24 98 % -- --   07/30/24 1456 (!) 139/57 99.5 °F (37.5 °C) Oral 88 22 99 % 1.803 m (5' 11\") 94.3 kg (208 lb)        LDAs:   Peripheral IV 07/30/24 Left Antecubital (Active)   Site Assessment Clean, dry & intact 07/30/24 1802   Line Status Infusing 07/30/24 1802   Phlebitis Assessment No symptoms 07/30/24 1802   Infiltration Assessment 0 07/30/24 1802   Dressing Status Clean, dry & intact 07/30/24 1802       Peripheral IV 07/30/24 Right;Anterior Wrist (Active)   Site Assessment Clean, dry & intact 07/30/24 1802   Line Status Normal saline locked 07/30/24 1802   Phlebitis Assessment No symptoms 07/30/24 1802   Infiltration Assessment 0 07/30/24 1802   Dressing Status Clean, dry & intact 07/30/24 1802   Dressing Type Transparent 07/30/24 1802       Ambulatory  Status:  Presents to emergency department  because of falls (Syncope, seizure, or loss of consciousness): No, Age > 70: No, Altered Mental Status, Intoxication with alcohol or substance confusion (Disorientation, impaired judgment, poor safety awaremess, or inability to follow instructions): No, Impaired Mobility: Ambulates or transfers with assistive devices or assistance; Unable to ambulate or transer.: Yes, Nursing Judgement: Yes    Diagnosis:  DISPOSITION Admitted 07/30/2024 08:10:57 PM   Final diagnoses:   Lower GI bleeding   Acute anemia        Consults:  IP CONSULT TO GI     Pain Score:  Pain Assessment  Pain Assessment: 0-10  Pain Level: 8  Patient's Stated Pain Goal: 4  Pain Location: Abdomen, Chest  Pain Descriptors: Sharp, Stabbing    C-SSRS:   Risk of Suicide: No Risk    Sepsis Screening:       Gema Fall Risk:  [place] 1 Fall Risk  Presents to emergency department  because of falls (Syncope, seizure, or loss of consciousness): No  Age > 70: No  Altered Mental Status, Intoxication with alcohol or substance confusion (Disorientation, impaired judgment, poor safety awaremess, or inability to follow instructions): No  Impaired Mobility: Ambulates or transfers with assistive devices or assistance; Unable to ambulate or transer.: Yes  Nursing Judgement: Yes  Standard Fall Risk Interventions : [place] the patient to the environment, especially the location of the bathroom, Provide a safe environment by clearing a pathway free of plugs, IV poles, and other obstructing items, Bed in lowest position and wheels locked, as applicable to the type of bed, when a patient is resting in bed, raise bed to a comfortable height when the patient is transferring out of bed, as appropriate, Intentional Rounding, Call light and frequently used items within patient reach    Swallow Screening        Preferred Language:   English      ALLERGIES     Acetaminophen and Ibuprofen    SURGICAL HISTORY       Past Surgical History:

## 2024-07-31 NOTE — CARE COORDINATION
07/31/24 1105   Readmission Assessment   Number of Days since last admission? 1-7 days   Previous Disposition Home with Family   Who is being Interviewed Patient   What was the patient's/caregiver's perception as to why they think they needed to return back to the hospital? AMA discharge on prior admission   Did you visit your Primary Care Physician after you left the hospital, before you returned this time? No  (I just left 2 days ago)   Why weren't you able to visit your PCP? Other (Comment)  (just left 2 days ago)   Did you see a specialist, such as Cardiac, Pulmonary, Orthopedic Physician, etc. after you left the hospital? No   Who advised the patient to return to the hospital? Self-referral   Does the patient report anything that got in the way of taking their medications? No   In our efforts to provide the best possible care to you and others like you, can you think of anything that we could have done to help you after you left the hospital the first time, so that you might not have needed to return so soon? Other (Comment)  (I left because I didn't feel good about the nurses.)

## 2024-07-31 NOTE — PLAN OF CARE
Problem: Discharge Planning  Goal: Discharge to home or other facility with appropriate resources  Outcome: Progressing  Flowsheets (Taken 7/30/2024 2145)  Discharge to home or other facility with appropriate resources:   Identify barriers to discharge with patient and caregiver   Arrange for needed discharge resources and transportation as appropriate   Identify discharge learning needs (meds, wound care, etc)     Problem: Pain  Goal: Verbalizes/displays adequate comfort level or baseline comfort level  Outcome: Progressing     Problem: ABCDS Injury Assessment  Goal: Absence of physical injury  Outcome: Progressing     Problem: Risk for Elopement  Goal: Patient will not exit the unit/facility without proper excort  Outcome: Progressing  Flowsheets  Taken 7/30/2024 2145  Nursing Interventions for Elopement Risk:   Assist with personal care needs such as toileting, eating, dressing, as needed to reduce the risk of wandering   Collaborate with treatment team for drug withdrawal symptoms treatment   Collaborate with treatment team for nicotine replacement   Communicate/escalate to charge nurse the risk of elopement  Taken 7/30/2024 2100  Nursing Interventions for Elopement Risk:   Assist with personal care needs such as toileting, eating, dressing, as needed to reduce the risk of wandering   Collaborate with treatment team for drug withdrawal symptoms treatment   Collaborate with treatment team for nicotine replacement   Communicate/escalate to charge nurse the risk of elopement     Problem: Safety - Adult  Goal: Free from fall injury  Outcome: Progressing
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
## 2024-07-31 NOTE — ED NOTES
Plasma initiated per order. Pt observed by this RN for the first 15 minutes. No signs of reaction noted.

## 2024-07-31 NOTE — CONSULTS
CONSULTATION NOTE :GI       Patient - Vasyl Patel,  Age - 69 y.o.    - 1955      Room Number - 3A-06/006-A   MRN -  [id number]   [facility] # - [id number]  Date of Admission -  2024  2:50 PM  Patient's PCP: Remington Tai MD     Requesting Physician: Sarai Maynard MD    REASON FOR CONSULTATION   Anemia, melena, cirrhosis    HISTORY OF PRESENT ILLNESS     Per H&P:  This is a very pleasant 69 y.o. male who was Re admitted to the hospital after leaving AMA with a chief complaints of fatigue. Pt was recently admitted to [facility]  for suspected LGIB but left AMA. Pt had recent colonoscopy w/ polypectomy on  with Dr. Rojas, and had been having black stools since. Since pt left AMA, he had been having improving stool color, but yesterday they began to appear very dark once more. Denies amy blood in stool. States he also thought his urine had a pink tinge as well, which was new. Reports that he woke this AM with severe fatigue, no energy to even get up, some lightheadedness, and abdominal pain. Denies any syncope, but endorses some worsened SOB. States he has chronic intermittent chest pain, denies worsening. Denies HA, hematemesis, diarrhea, constipation, extremity swelling.     At time of examination patient resting comfortably. No bowel movements since admission although he states he though it cleared up. No chest pain or SOB at this time. Abdominal tenderness. Pt denies ever having had paracentesis.    24 CT abd/pelvis-  Inflammatory changes surrounding the pancreatic head and proximal body   suggestive of acute pancreatitis. This is new since prior imaging. Mild   inflammation of the adjacent duodenum likely represents reactive   duodenitis.  2. No free intraperitoneal air or evidence of focal bowel injury. Of note   evaluation for bowel injury is limited secondary to abdominal ascites.  3. Cirrhotic hepatic morphology with innumerable indeterminate lesions

## 2024-07-31 NOTE — CARE COORDINATION
Case Management Assessment Initial Evaluation    Date/Time of Evaluation: 2024 8:10 AM  Assessment Completed by: Marly Iverson RN    If patient is discharged prior to next notation, then this note serves as note for discharge by case management.    Patient Name: Vasyl Patel                   YOB: 1955  Diagnosis: Heart murmur [R01.1]  Lower GI bleeding [K92.2]  Acute anemia [D64.9]                   Date / Time: 2024  2:50 PM  Location: [facility]     Patient Admission Status: Inpatient   Readmission Risk Low 0-14, Mod 15-19), High > 20: Readmission Risk Score: 19.8    Current PCP: Remington Tai MD  Health Care Decision Makers: Sister Kendy Robertson [phone]    Additional Case Management Notes: From ED. Creatinine 1.5, Hgb 5.9 on presentation, transfused PRBC's, Hgb 7.3 today with orders in place for PRBC transfusion. Diabetes management, GI consult, telemetry, IV Protonix, electrolyte replacement protocols.    Procedures: none    Imagin/30 CXR No acute intrathoracic process.      CT Abdomen Pelvis W IV Contrast 1. Inflammatory changes surrounding the pancreatic head and proximal body   suggestive of acute pancreatitis. This is new since prior imaging. Mild   inflammation of the adjacent duodenum likely represents reactive   duodenitis.   2. No free intraperitoneal air or evidence of focal bowel injury. Of note   evaluation for bowel injury is limited secondary to abdominal ascites.   3. Cirrhotic hepatic morphology with innumerable indeterminate lesions   within the liver suggestive of metastatic disease versus primary hepatic   malignancy.   4. 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   5. Moderate volume abdominal ascites redemonstrated.   6. Colonic diverticulosis without definitive evidence for diverticulitis   although evaluation is limited given abdominal ascites.   7. Marked hepatosplenomegaly.       Patient Goals/Plan/Treatment

## 2024-08-02 LAB
REASON FOR REJECTION: NORMAL
REJECTED TEST: NORMAL

## 2024-08-04 LAB
BACTERIA BLD AEROBE CULT: NORMAL
BACTERIA BLD AEROBE CULT: NORMAL